# Patient Record
Sex: FEMALE | Race: WHITE | Employment: OTHER | URBAN - METROPOLITAN AREA
[De-identification: names, ages, dates, MRNs, and addresses within clinical notes are randomized per-mention and may not be internally consistent; named-entity substitution may affect disease eponyms.]

---

## 2019-02-07 ENCOUNTER — TELEPHONE (OUTPATIENT)
Dept: FAMILY MEDICINE CLINIC | Age: 64
End: 2019-02-07

## 2019-03-07 ENCOUNTER — OFFICE VISIT (OUTPATIENT)
Dept: FAMILY MEDICINE CLINIC | Age: 64
End: 2019-03-07
Payer: COMMERCIAL

## 2019-03-07 VITALS
DIASTOLIC BLOOD PRESSURE: 90 MMHG | OXYGEN SATURATION: 98 % | HEIGHT: 60 IN | SYSTOLIC BLOOD PRESSURE: 169 MMHG | HEART RATE: 107 BPM | BODY MASS INDEX: 43.07 KG/M2 | WEIGHT: 219.4 LBS

## 2019-03-07 DIAGNOSIS — I73.00 RAYNAUD'S DISEASE WITHOUT GANGRENE: ICD-10-CM

## 2019-03-07 DIAGNOSIS — K21.00 GASTROESOPHAGEAL REFLUX DISEASE WITH ESOPHAGITIS: ICD-10-CM

## 2019-03-07 DIAGNOSIS — Z00.00 WELL ADULT EXAM: ICD-10-CM

## 2019-03-07 DIAGNOSIS — R03.0 BORDERLINE HIGH BLOOD PRESSURE: ICD-10-CM

## 2019-03-07 PROCEDURE — 99203 OFFICE O/P NEW LOW 30 MIN: CPT | Performed by: FAMILY MEDICINE

## 2019-03-07 RX ORDER — PANTOPRAZOLE SODIUM 20 MG/1
20 TABLET, DELAYED RELEASE ORAL
Qty: 30 TABLET | Refills: 0 | Status: SHIPPED | OUTPATIENT
Start: 2019-03-07 | End: 2019-04-18 | Stop reason: SDUPTHER

## 2019-03-07 SDOH — SOCIAL STABILITY: SOCIAL INSECURITY: WITHIN THE LAST YEAR, HAVE YOU BEEN AFRAID OF YOUR PARTNER OR EX-PARTNER?: NO

## 2019-03-07 SDOH — SOCIAL STABILITY: SOCIAL NETWORK: ARE YOU MARRIED, WIDOWED, DIVORCED, SEPARATED, NEVER MARRIED, OR LIVING WITH A PARTNER?: MARRIED

## 2019-03-07 SDOH — SOCIAL STABILITY: SOCIAL INSECURITY
WITHIN THE LAST YEAR, HAVE YOU BEEN KICKED, HIT, SLAPPED, OR OTHERWISE PHYSICALLY HURT BY YOUR PARTNER OR EX-PARTNER?: NO

## 2019-03-07 SDOH — SOCIAL STABILITY: SOCIAL INSECURITY
WITHIN THE LAST YEAR, HAVE TO BEEN RAPED OR FORCED TO HAVE ANY KIND OF SEXUAL ACTIVITY BY YOUR PARTNER OR EX-PARTNER?: NO

## 2019-03-07 SDOH — HEALTH STABILITY: MENTAL HEALTH: HOW OFTEN DO YOU HAVE A DRINK CONTAINING ALCOHOL?: 4 OR MORE TIMES A WEEK

## 2019-03-07 SDOH — SOCIAL STABILITY: SOCIAL NETWORK: HOW OFTEN DO YOU ATTEND CHURCH OR RELIGIOUS SERVICES?: MORE THAN 4 TIMES PER YEAR

## 2019-03-07 SDOH — HEALTH STABILITY: MENTAL HEALTH: HOW MANY STANDARD DRINKS CONTAINING ALCOHOL DO YOU HAVE ON A TYPICAL DAY?: 1 OR 2

## 2019-03-07 SDOH — SOCIAL STABILITY: SOCIAL NETWORK
DO YOU BELONG TO ANY CLUBS OR ORGANIZATIONS SUCH AS CHURCH GROUPS UNIONS, FRATERNAL OR ATHLETIC GROUPS, OR SCHOOL GROUPS?: NO

## 2019-03-07 SDOH — SOCIAL STABILITY: SOCIAL NETWORK: IN A TYPICAL WEEK, HOW MANY TIMES DO YOU TALK ON THE PHONE WITH FAMILY, FRIENDS, OR NEIGHBORS?: THREE TIMES A WEEK

## 2019-03-07 SDOH — SOCIAL STABILITY: SOCIAL NETWORK: HOW OFTEN DO YOU ATTENT MEETINGS OF THE CLUB OR ORGANIZATION YOU BELONG TO?: NEVER

## 2019-03-07 SDOH — SOCIAL STABILITY: SOCIAL NETWORK: HOW OFTEN DO YOU GET TOGETHER WITH FRIENDS OR RELATIVES?: TWICE A WEEK

## 2019-03-07 SDOH — SOCIAL STABILITY: SOCIAL INSECURITY: WITHIN THE LAST YEAR, HAVE YOU BEEN HUMILIATED OR EMOTIONALLY ABUSED IN OTHER WAYS BY YOUR PARTNER OR EX-PARTNER?: NO

## 2019-03-07 ASSESSMENT — ENCOUNTER SYMPTOMS
BELCHING: 0
CHOKING: 0
STRIDOR: 0
WATER BRASH: 0
SORE THROAT: 0
HOARSE VOICE: 0
HEARTBURN: 1
COUGH: 1
ABDOMINAL PAIN: 0
GLOBUS SENSATION: 0
NAUSEA: 0
WHEEZING: 0

## 2019-03-07 ASSESSMENT — PATIENT HEALTH QUESTIONNAIRE - PHQ9
SUM OF ALL RESPONSES TO PHQ9 QUESTIONS 1 & 2: 0
2. FEELING DOWN, DEPRESSED OR HOPELESS: 0
SUM OF ALL RESPONSES TO PHQ QUESTIONS 1-9: 0
SUM OF ALL RESPONSES TO PHQ QUESTIONS 1-9: 0
1. LITTLE INTEREST OR PLEASURE IN DOING THINGS: 0

## 2019-04-06 PROBLEM — Z00.00 WELL ADULT EXAM: Status: RESOLVED | Noted: 2019-03-07 | Resolved: 2019-04-06

## 2019-04-18 ENCOUNTER — OFFICE VISIT (OUTPATIENT)
Dept: FAMILY MEDICINE CLINIC | Age: 64
End: 2019-04-18
Payer: COMMERCIAL

## 2019-04-18 VITALS
DIASTOLIC BLOOD PRESSURE: 106 MMHG | HEIGHT: 60 IN | OXYGEN SATURATION: 98 % | BODY MASS INDEX: 41.43 KG/M2 | WEIGHT: 211 LBS | HEART RATE: 95 BPM | SYSTOLIC BLOOD PRESSURE: 160 MMHG

## 2019-04-18 DIAGNOSIS — K21.00 GASTROESOPHAGEAL REFLUX DISEASE WITH ESOPHAGITIS: ICD-10-CM

## 2019-04-18 DIAGNOSIS — Z11.59 NEED FOR HEPATITIS C SCREENING TEST: ICD-10-CM

## 2019-04-18 DIAGNOSIS — R03.0 BORDERLINE HIGH BLOOD PRESSURE: ICD-10-CM

## 2019-04-18 DIAGNOSIS — E55.9 VITAMIN D DEFICIENCY: Primary | ICD-10-CM

## 2019-04-18 PROCEDURE — 99213 OFFICE O/P EST LOW 20 MIN: CPT | Performed by: FAMILY MEDICINE

## 2019-04-18 RX ORDER — PANTOPRAZOLE SODIUM 20 MG/1
20 TABLET, DELAYED RELEASE ORAL
Qty: 30 TABLET | Refills: 5 | Status: SHIPPED | OUTPATIENT
Start: 2019-04-18

## 2019-04-18 ASSESSMENT — ENCOUNTER SYMPTOMS
CHOKING: 0
ABDOMINAL PAIN: 0
HOARSE VOICE: 0
GLOBUS SENSATION: 0
WHEEZING: 0
HEARTBURN: 1
SORE THROAT: 0
WATER BRASH: 0
COUGH: 0
BELCHING: 0
STRIDOR: 0
NAUSEA: 0

## 2019-04-18 NOTE — PROGRESS NOTES
Subjective:     Patient Blayne Carranza is a 59 y.o. female. Gastroesophageal Reflux   She complains of heartburn. She reports no abdominal pain, no belching, no chest pain, no choking, no coughing, no dysphagia, no early satiety, no globus sensation, no hoarse voice, no nausea, no sore throat, no stridor, no tooth decay, no water brash or no wheezing. The current episode started 1 to 4 weeks ago. The problem occurs constantly. The heartburn is of moderate intensity. The heartburn does not wake her from sleep. The heartburn does not limit her activity. The heartburn doesn't change with position. The symptoms are aggravated by certain foods, ETOH and caffeine. Pertinent negatives include no anemia, fatigue, melena, muscle weakness, orthopnea or weight loss. She has tried a PPI for the symptoms. The treatment provided moderate relief. Past procedures do not include an abdominal ultrasound, an EGD, esophageal manometry, esophageal pH monitoring, H. pylori antibody titer or a UGI. Past invasive treatments do not include gastroplasty, gastroplication or reflux surgery. No Known Allergies    Current Outpatient Medications   Medication Sig Dispense Refill    pantoprazole (PROTONIX) 20 MG tablet Take 1 tablet by mouth every morning (before breakfast) 30 tablet 0     No current facility-administered medications for this visit. No past medical history on file.     Past Surgical History:   Procedure Laterality Date    BREAST BIOPSY  1990    benign       Social History     Socioeconomic History    Marital status:      Spouse name: Not on file    Number of children: 2    Years of education: Not on file    Highest education level: Not on file   Occupational History    Occupation: housewife   Social Needs    Financial resource strain: Not on file    Food insecurity:     Worry: Not on file     Inability: Not on file   [address] needs:     Medical: Not on file     Non-medical: Not on file Tobacco Use    Smoking status: Former Smoker     Types: Cigarettes     Last attempt to quit: 3/20/2005     Years since quittin.0    Smokeless tobacco: Never Used   Substance and Sexual Activity    Alcohol use: Yes     Alcohol/week: 0.6 - 1.2 oz     Types: 1 - 2 Cans of beer per week     Frequency: 4 or more times a week     Drinks per session: 1 or 2     Binge frequency: Never    Drug use: Never    Sexual activity: Yes     Partners: Male   Lifestyle    Physical activity:     Days per week: Not on file     Minutes per session: Not on file    Stress: Not on file   Relationships    Social connections:     Talks on phone: Three times a week     Gets together: Twice a week     Attends Anglican service: More than 4 times per year     Active member of club or organization: No     Attends meetings of clubs or organizations: Never     Relationship status:     Intimate partner violence:     Fear of current or ex partner: No     Emotionally abused: No     Physically abused: No     Forced sexual activity: No   Other Topics Concern    Not on file   Social History Narrative    Happily --has special needs kid    Hobbies--reads--puzzles       Family History   Problem Relation Age of Onset    Breast Cancer Mother 50       Immunization History   Administered Date(s) Administered    Tdap (Boostrix, Adacel) 2018       Review of Systems  Review of Systems   Constitutional: Negative for fatigue and weight loss. HENT: Negative for hoarse voice and sore throat. Respiratory: Negative for cough, choking and wheezing. Cardiovascular: Negative for chest pain. Gastrointestinal: Positive for heartburn. Negative for abdominal pain, dysphagia, melena and nausea. Musculoskeletal: Negative for muscle weakness. Objective:   Physical Exam  Physical Exam   Constitutional: She is oriented to person, place, and time. She appears well-developed and well-nourished. No distress.    HENT:

## 2019-04-29 DIAGNOSIS — K21.00 GASTROESOPHAGEAL REFLUX DISEASE WITH ESOPHAGITIS: ICD-10-CM

## 2019-04-29 DIAGNOSIS — R03.0 BORDERLINE HIGH BLOOD PRESSURE: ICD-10-CM

## 2019-04-29 DIAGNOSIS — E55.9 VITAMIN D DEFICIENCY: ICD-10-CM

## 2019-04-29 DIAGNOSIS — Z11.59 NEED FOR HEPATITIS C SCREENING TEST: ICD-10-CM

## 2019-04-29 DIAGNOSIS — R79.89 ABNORMAL TSH: Primary | ICD-10-CM

## 2019-04-29 LAB
A/G RATIO: 1.6 (ref 1.1–2.2)
ALBUMIN SERPL-MCNC: 4.2 G/DL (ref 3.4–5)
ALP BLD-CCNC: 73 U/L (ref 40–129)
ALT SERPL-CCNC: 26 U/L (ref 10–40)
ANION GAP SERPL CALCULATED.3IONS-SCNC: 14 MMOL/L (ref 3–16)
AST SERPL-CCNC: 48 U/L (ref 15–37)
BASOPHILS ABSOLUTE: 0.1 K/UL (ref 0–0.2)
BASOPHILS RELATIVE PERCENT: 0.8 %
BILIRUB SERPL-MCNC: 0.8 MG/DL (ref 0–1)
BUN BLDV-MCNC: 7 MG/DL (ref 7–20)
CALCIUM SERPL-MCNC: 9.4 MG/DL (ref 8.3–10.6)
CHLORIDE BLD-SCNC: 98 MMOL/L (ref 99–110)
CHOLESTEROL, TOTAL: 217 MG/DL (ref 0–199)
CO2: 27 MMOL/L (ref 21–32)
CREAT SERPL-MCNC: <0.5 MG/DL (ref 0.6–1.2)
EOSINOPHILS ABSOLUTE: 0.2 K/UL (ref 0–0.6)
EOSINOPHILS RELATIVE PERCENT: 3.6 %
GFR AFRICAN AMERICAN: >60
GFR NON-AFRICAN AMERICAN: >60
GLOBULIN: 2.6 G/DL
GLUCOSE BLD-MCNC: 90 MG/DL (ref 70–99)
HCT VFR BLD CALC: 41.9 % (ref 36–48)
HDLC SERPL-MCNC: 56 MG/DL (ref 40–60)
HEMOGLOBIN: 14.2 G/DL (ref 12–16)
HEPATITIS C ANTIBODY INTERPRETATION: NORMAL
LDL CHOLESTEROL CALCULATED: 136 MG/DL
LYMPHOCYTES ABSOLUTE: 2.1 K/UL (ref 1–5.1)
LYMPHOCYTES RELATIVE PERCENT: 31.6 %
MCH RBC QN AUTO: 30.8 PG (ref 26–34)
MCHC RBC AUTO-ENTMCNC: 33.8 G/DL (ref 31–36)
MCV RBC AUTO: 91.2 FL (ref 80–100)
MONOCYTES ABSOLUTE: 0.5 K/UL (ref 0–1.3)
MONOCYTES RELATIVE PERCENT: 7.7 %
NEUTROPHILS ABSOLUTE: 3.8 K/UL (ref 1.7–7.7)
NEUTROPHILS RELATIVE PERCENT: 56.3 %
PDW BLD-RTO: 13.5 % (ref 12.4–15.4)
PLATELET # BLD: 325 K/UL (ref 135–450)
PMV BLD AUTO: 9.2 FL (ref 5–10.5)
POTASSIUM SERPL-SCNC: 4.7 MMOL/L (ref 3.5–5.1)
RBC # BLD: 4.59 M/UL (ref 4–5.2)
SODIUM BLD-SCNC: 139 MMOL/L (ref 136–145)
TOTAL PROTEIN: 6.8 G/DL (ref 6.4–8.2)
TRIGL SERPL-MCNC: 127 MG/DL (ref 0–150)
TSH SERPL DL<=0.05 MIU/L-ACNC: 5.9 UIU/ML (ref 0.27–4.2)
VITAMIN D 25-HYDROXY: 24.6 NG/ML
VLDLC SERPL CALC-MCNC: 25 MG/DL
WBC # BLD: 6.7 K/UL (ref 4–11)

## 2019-05-20 ENCOUNTER — OFFICE VISIT (OUTPATIENT)
Dept: FAMILY MEDICINE CLINIC | Age: 64
End: 2019-05-20
Payer: COMMERCIAL

## 2019-05-20 VITALS
BODY MASS INDEX: 40.35 KG/M2 | SYSTOLIC BLOOD PRESSURE: 134 MMHG | HEART RATE: 103 BPM | OXYGEN SATURATION: 95 % | WEIGHT: 206.6 LBS | DIASTOLIC BLOOD PRESSURE: 82 MMHG

## 2019-05-20 DIAGNOSIS — R03.0 BORDERLINE HIGH BLOOD PRESSURE: ICD-10-CM

## 2019-05-20 DIAGNOSIS — J30.1 SEASONAL ALLERGIC RHINITIS DUE TO POLLEN: ICD-10-CM

## 2019-05-20 PROCEDURE — 99213 OFFICE O/P EST LOW 20 MIN: CPT | Performed by: FAMILY MEDICINE

## 2019-05-20 ASSESSMENT — ENCOUNTER SYMPTOMS
SHORTNESS OF BREATH: 0
HEMOPTYSIS: 0
HEARTBURN: 0
WHEEZING: 0
SORE THROAT: 0
COUGH: 1
RHINORRHEA: 0

## 2019-05-20 NOTE — PROGRESS NOTES
Subjective:     Patient Gabriella Warner is a 59 y.o. female. Cough   This is a new problem. The current episode started in the past 7 days. The problem has been waxing and waning. The problem occurs constantly. The cough is non-productive. Pertinent negatives include no chest pain, chills, ear congestion, ear pain, fever, headaches, heartburn, hemoptysis, myalgias, nasal congestion, postnasal drip, rash, rhinorrhea, sore throat, shortness of breath, sweats, weight loss or wheezing. Nothing aggravates the symptoms. She has tried nothing for the symptoms. The treatment provided no relief. Her past medical history is significant for environmental allergies. There is no history of asthma, bronchiectasis, bronchitis, COPD, emphysema or pneumonia. No Known Allergies    Current Outpatient Medications   Medication Sig Dispense Refill    pantoprazole (PROTONIX) 20 MG tablet Take 1 tablet by mouth every morning (before breakfast) 30 tablet 5     No current facility-administered medications for this visit. No past medical history on file. Past Surgical History:   Procedure Laterality Date    BREAST BIOPSY      benign       Social History     Socioeconomic History    Marital status:      Spouse name: Not on file    Number of children: 2    Years of education: Not on file    Highest education level: Not on file   Occupational History    Occupation: housewife   Social Needs    Financial resource strain: Not on file    Food insecurity:     Worry: Not on file     Inability: Not on file   [employer] needs:     Medical: Not on file     Non-medical: Not on file   Tobacco Use    Smoking status: Former Smoker     Types: Cigarettes     Last attempt to quit: 3/20/2005     Years since quittin.1    Smokeless tobacco: Never Used   Substance and Sexual Activity    Alcohol use:  Yes     Alcohol/week: 0.6 - 1.2 oz     Types: 1 - 2 Cans of beer per week     Frequency: 4 or more times a Cardiovascular: Normal rate, regular rhythm, normal heart sounds and intact distal pulses. Exam reveals no gallop. No murmur heard. Pulmonary/Chest: Effort normal and breath sounds normal. No stridor. No respiratory distress. She has no wheezes. She has no rales. She exhibits no tenderness. Abdominal: Soft. Bowel sounds are normal. She exhibits no distension and no mass. There is no tenderness. Musculoskeletal: She exhibits no edema or tenderness. Lymphadenopathy:     She has no cervical adenopathy. Neurological: She is alert and oriented to person, place, and time. She displays normal reflexes. No cranial nerve deficit. She exhibits normal muscle tone. Coordination normal.   Skin: Skin is warm and dry. No rash noted. No erythema. No pallor. Psychiatric: She has a normal mood and affect. Her behavior is normal. Judgment and thought content normal.   Vitals reviewed.       Assessment and Plan:       Borderline high blood pressure  Still high--will watch--her home numbers were high    Seasonal allergic rhinitis due to pollen  Allegra and nasacort

## 2019-07-15 ENCOUNTER — OFFICE VISIT (OUTPATIENT)
Dept: FAMILY MEDICINE CLINIC | Age: 64
End: 2019-07-15
Payer: COMMERCIAL

## 2019-07-15 VITALS
SYSTOLIC BLOOD PRESSURE: 180 MMHG | HEIGHT: 60 IN | OXYGEN SATURATION: 97 % | BODY MASS INDEX: 39.03 KG/M2 | WEIGHT: 198.8 LBS | DIASTOLIC BLOOD PRESSURE: 120 MMHG | HEART RATE: 76 BPM

## 2019-07-15 DIAGNOSIS — I10 BENIGN ESSENTIAL HTN: ICD-10-CM

## 2019-07-15 PROCEDURE — 99213 OFFICE O/P EST LOW 20 MIN: CPT | Performed by: FAMILY MEDICINE

## 2019-07-15 RX ORDER — LISINOPRIL 10 MG/1
10 TABLET ORAL DAILY
Qty: 30 TABLET | Refills: 1 | Status: SHIPPED | OUTPATIENT
Start: 2019-07-15 | End: 2019-08-30

## 2019-07-15 ASSESSMENT — ENCOUNTER SYMPTOMS
ORTHOPNEA: 0
SHORTNESS OF BREATH: 0
BLURRED VISION: 0

## 2019-07-15 NOTE — PROGRESS NOTES
Subjective:     Patient Mariela Reyes is a 59 y.o. female. Hypertension   This is a new problem. The current episode started 1 to 4 weeks ago. The problem is unchanged. The problem is uncontrolled. Pertinent negatives include no anxiety, blurred vision, chest pain, headaches, malaise/fatigue, neck pain, orthopnea, palpitations, peripheral edema, PND, shortness of breath or sweats. There are no associated agents to hypertension. There are no known risk factors for coronary artery disease. Past treatments include ACE inhibitors. The current treatment provides moderate improvement. No Known Allergies    Current Outpatient Medications   Medication Sig Dispense Refill    Multiple Vitamins-Minerals (MULTIVITAMIN ADULT PO) Take by mouth      Nutritional Supplements (VITAMIN D BOOSTER PO) Take by mouth      pantoprazole (PROTONIX) 20 MG tablet Take 1 tablet by mouth every morning (before breakfast) 30 tablet 5     No current facility-administered medications for this visit. No past medical history on file. Past Surgical History:   Procedure Laterality Date    BREAST BIOPSY      benign       Social History     Socioeconomic History    Marital status:      Spouse name: Not on file    Number of children: 2    Years of education: Not on file    Highest education level: Not on file   Occupational History    Occupation: housewife   Social Needs    Financial resource strain: Not on file    Food insecurity:     Worry: Not on file     Inability: Not on file   [place] needs:     Medical: Not on file     Non-medical: Not on file   Tobacco Use    Smoking status: Former Smoker     Types: Cigarettes     Last attempt to quit: 3/20/2005     Years since quittin.3    Smokeless tobacco: Never Used   Substance and Sexual Activity    Alcohol use:  Yes     Alcohol/week: 1.0 - 2.0 standard drinks     Types: 1 - 2 Cans of beer per week     Frequency: 4 or more times a week     Drinks per session: 1 or 2     Binge frequency: Never    Drug use: Never    Sexual activity: Yes     Partners: Male   Lifestyle    Physical activity:     Days per week: Not on file     Minutes per session: Not on file    Stress: Not on file   Relationships    Social connections:     Talks on phone: Three times a week     Gets together: Twice a week     Attends Latter-day service: More than 4 times per year     Active member of club or organization: No     Attends meetings of clubs or organizations: Never     Relationship status:     Intimate partner violence:     Fear of current or ex partner: No     Emotionally abused: No     Physically abused: No     Forced sexual activity: No   Other Topics Concern    Not on file   Social History Narrative    Happily --has special needs kid    Hobbies--reads--puzzles       Family History   Problem Relation Age of Onset    Breast Cancer Mother 50       Immunization History   Administered Date(s) Administered    Tdap (Boostrix, Adacel) 11/09/2018       Review of Systems  Review of Systems   Constitutional: Negative for malaise/fatigue. Eyes: Negative for blurred vision. Respiratory: Negative for shortness of breath. Cardiovascular: Negative for chest pain, palpitations, orthopnea and PND. Musculoskeletal: Negative for neck pain. Neurological: Negative for headaches. Objective:   Physical Exam  Physical Exam   Constitutional: She is oriented to person, place, and time. She appears well-developed and well-nourished. No distress. HENT:   Mouth/Throat: Oropharynx is clear and moist.   Eyes: Pupils are equal, round, and reactive to light. Conjunctivae are normal.   Neck: No JVD present. No tracheal deviation present. No thyromegaly present. Cardiovascular: Normal rate, regular rhythm, normal heart sounds and intact distal pulses. Exam reveals no gallop. No murmur heard. Pulmonary/Chest: Effort normal and breath sounds normal. No stridor.  No

## 2019-08-21 ENCOUNTER — OFFICE VISIT (OUTPATIENT)
Dept: FAMILY MEDICINE CLINIC | Age: 64
End: 2019-08-21
Payer: COMMERCIAL

## 2019-08-21 VITALS
SYSTOLIC BLOOD PRESSURE: 160 MMHG | WEIGHT: 197.6 LBS | DIASTOLIC BLOOD PRESSURE: 110 MMHG | HEIGHT: 60 IN | BODY MASS INDEX: 38.79 KG/M2 | HEART RATE: 96 BPM | OXYGEN SATURATION: 99 %

## 2019-08-21 DIAGNOSIS — M62.81 MUSCLE WEAKNESS (GENERALIZED): ICD-10-CM

## 2019-08-21 DIAGNOSIS — R19.7 DIARRHEA DUE TO MALABSORPTION: ICD-10-CM

## 2019-08-21 DIAGNOSIS — I10 BENIGN ESSENTIAL HTN: ICD-10-CM

## 2019-08-21 DIAGNOSIS — K90.9 DIARRHEA DUE TO MALABSORPTION: ICD-10-CM

## 2019-08-21 LAB
A/G RATIO: 1.8 (ref 1.1–2.2)
ALBUMIN SERPL-MCNC: 4.7 G/DL (ref 3.4–5)
ALP BLD-CCNC: 63 U/L (ref 40–129)
ALT SERPL-CCNC: 41 U/L (ref 10–40)
ANION GAP SERPL CALCULATED.3IONS-SCNC: 14 MMOL/L (ref 3–16)
AST SERPL-CCNC: 87 U/L (ref 15–37)
BASOPHILS ABSOLUTE: 0 K/UL (ref 0–0.2)
BASOPHILS RELATIVE PERCENT: 0.4 %
BILIRUB SERPL-MCNC: 0.7 MG/DL (ref 0–1)
BUN BLDV-MCNC: 12 MG/DL (ref 7–20)
CALCIUM SERPL-MCNC: 9.7 MG/DL (ref 8.3–10.6)
CHLORIDE BLD-SCNC: 100 MMOL/L (ref 99–110)
CO2: 25 MMOL/L (ref 21–32)
CREAT SERPL-MCNC: <0.5 MG/DL (ref 0.6–1.2)
EOSINOPHILS ABSOLUTE: 0.2 K/UL (ref 0–0.6)
EOSINOPHILS RELATIVE PERCENT: 2.4 %
GFR AFRICAN AMERICAN: >60
GFR NON-AFRICAN AMERICAN: >60
GLOBULIN: 2.6 G/DL
GLUCOSE BLD-MCNC: 88 MG/DL (ref 70–99)
HCT VFR BLD CALC: 42.6 % (ref 36–48)
HEMOGLOBIN: 14.3 G/DL (ref 12–16)
LYMPHOCYTES ABSOLUTE: 1.6 K/UL (ref 1–5.1)
LYMPHOCYTES RELATIVE PERCENT: 21.4 %
MAGNESIUM: 2.2 MG/DL (ref 1.8–2.4)
MCH RBC QN AUTO: 30.3 PG (ref 26–34)
MCHC RBC AUTO-ENTMCNC: 33.6 G/DL (ref 31–36)
MCV RBC AUTO: 90.4 FL (ref 80–100)
MONOCYTES ABSOLUTE: 0.4 K/UL (ref 0–1.3)
MONOCYTES RELATIVE PERCENT: 6.1 %
NEUTROPHILS ABSOLUTE: 5.1 K/UL (ref 1.7–7.7)
NEUTROPHILS RELATIVE PERCENT: 69.7 %
PDW BLD-RTO: 14 % (ref 12.4–15.4)
PLATELET # BLD: 284 K/UL (ref 135–450)
PMV BLD AUTO: 8.9 FL (ref 5–10.5)
POTASSIUM SERPL-SCNC: 4.6 MMOL/L (ref 3.5–5.1)
RBC # BLD: 4.71 M/UL (ref 4–5.2)
SEDIMENTATION RATE, ERYTHROCYTE: 13 MM/HR (ref 0–30)
SODIUM BLD-SCNC: 139 MMOL/L (ref 136–145)
TOTAL CK: 4229 U/L (ref 26–192)
TOTAL PROTEIN: 7.3 G/DL (ref 6.4–8.2)
TSH SERPL DL<=0.05 MIU/L-ACNC: 4.93 UIU/ML (ref 0.27–4.2)
WBC # BLD: 7.3 K/UL (ref 4–11)

## 2019-08-21 PROCEDURE — 99214 OFFICE O/P EST MOD 30 MIN: CPT | Performed by: FAMILY MEDICINE

## 2019-08-21 ASSESSMENT — ENCOUNTER SYMPTOMS
COUGH: 0
VISUAL CHANGE: 0
SWOLLEN GLANDS: 0
ORTHOPNEA: 0
CHANGE IN BOWEL HABIT: 0
SORE THROAT: 0
SHORTNESS OF BREATH: 0
BLURRED VISION: 0
NAUSEA: 0
ABDOMINAL PAIN: 0
VOMITING: 0

## 2019-08-21 NOTE — PROGRESS NOTES
MG tablet Take 1 tablet by mouth every morning (before breakfast) 30 tablet 5     No current facility-administered medications for this visit. Past Medical History:   Diagnosis Date    Benign essential HTN 3/7/2019       Past Surgical History:   Procedure Laterality Date    BREAST BIOPSY      benign       Social History     Socioeconomic History    Marital status:      Spouse name: Not on file    Number of children: 2    Years of education: Not on file    Highest education level: Not on file   Occupational History    Occupation: housewife   Social Needs    Financial resource strain: Not on file    Food insecurity:     Worry: Not on file     Inability: Not on file   [employer] needs:     Medical: Not on file     Non-medical: Not on file   Tobacco Use    Smoking status: Former Smoker     Types: Cigarettes     Last attempt to quit: 3/20/2005     Years since quittin.4    Smokeless tobacco: Never Used   Substance and Sexual Activity    Alcohol use: Yes     Alcohol/week: 1.0 - 2.0 standard drinks     Types: 1 - 2 Cans of beer per week     Frequency: 4 or more times a week     Drinks per session: 1 or 2     Binge frequency: Never    Drug use: Never    Sexual activity: Yes     Partners: Male   Lifestyle    Physical activity:     Days per week: Not on file     Minutes per session: Not on file    Stress: Not on file   Relationships    Social connections:     Talks on phone:  Three times a week     Gets together: Twice a week     Attends Zoroastrianism service: More than 4 times per year     Active member of club or organization: No     Attends meetings of clubs or organizations: Never     Relationship status:     Intimate partner violence:     Fear of current or ex partner: No     Emotionally abused: No     Physically abused: No     Forced sexual activity: No   Other Topics Concern    Not on file   Social History Narrative    Happily --has special needs kid Hobbies--reads--puzzles       Family History   Problem Relation Age of Onset    Breast Cancer Mother 50       Immunization History   Administered Date(s) Administered    Tdap (Boostrix, Adacel) 11/09/2018       Review of Systems  Review of Systems   Constitutional: Positive for fatigue. Negative for chills, fever and malaise/fatigue. HENT: Negative for congestion and sore throat. Eyes: Negative for blurred vision. Respiratory: Negative for cough and shortness of breath. Cardiovascular: Negative for chest pain, palpitations, orthopnea and PND. Gastrointestinal: Negative for abdominal pain, anorexia, change in bowel habit, nausea and vomiting. Musculoskeletal: Negative for arthralgias, joint swelling, myalgias and neck pain. Skin: Negative for rash. Neurological: Positive for weakness (LE>UE). Negative for vertigo, numbness and headaches. Objective:   Physical Exam  Physical Exam   Constitutional: She is oriented to person, place, and time. She appears well-developed and well-nourished. No distress. HENT:   Mouth/Throat: Oropharynx is clear and moist.   Eyes: Pupils are equal, round, and reactive to light. Conjunctivae are normal.   Neck: No JVD present. No tracheal deviation present. No thyromegaly present. Cardiovascular: Normal rate, regular rhythm, normal heart sounds and intact distal pulses. Exam reveals no gallop. No murmur heard. Pulmonary/Chest: Effort normal and breath sounds normal. No stridor. No respiratory distress. She has no wheezes. She has no rales. She exhibits no tenderness. Abdominal: Soft. Bowel sounds are normal. She exhibits no distension and no mass. There is no tenderness. Musculoskeletal: She exhibits no edema or tenderness. Lymphadenopathy:     She has no cervical adenopathy. Neurological: She is alert and oriented to person, place, and time. She displays normal reflexes. No cranial nerve deficit. She exhibits normal muscle tone.  Coordination normal.

## 2019-08-22 ENCOUNTER — TELEPHONE (OUTPATIENT)
Dept: FAMILY MEDICINE CLINIC | Age: 64
End: 2019-08-22

## 2019-08-22 DIAGNOSIS — M62.81 MUSCLE WEAKNESS (GENERALIZED): Primary | ICD-10-CM

## 2019-08-22 NOTE — TELEPHONE ENCOUNTER
Pt states she spoke with Dr. Saniya Faria and he wants her to be scheduled for next Thursday or Friday.  Pls call pt to schedule

## 2019-08-23 DIAGNOSIS — M62.81 MUSCLE WEAKNESS (GENERALIZED): ICD-10-CM

## 2019-08-23 LAB — IGA: 190 MG/DL (ref 68–408)

## 2019-08-24 LAB — TISSUE TRANSGLUTAMINASE IGA: 0 U/ML (ref 0–3)

## 2019-08-25 LAB — ALDOLASE: 37.5 U/L (ref 1.5–8.1)

## 2019-08-30 ENCOUNTER — TELEPHONE (OUTPATIENT)
Dept: FAMILY MEDICINE CLINIC | Age: 64
End: 2019-08-30

## 2019-08-30 ENCOUNTER — OFFICE VISIT (OUTPATIENT)
Dept: FAMILY MEDICINE CLINIC | Age: 64
End: 2019-08-30
Payer: COMMERCIAL

## 2019-08-30 VITALS
DIASTOLIC BLOOD PRESSURE: 110 MMHG | OXYGEN SATURATION: 98 % | SYSTOLIC BLOOD PRESSURE: 180 MMHG | BODY MASS INDEX: 38.52 KG/M2 | HEIGHT: 60 IN | WEIGHT: 196.2 LBS | HEART RATE: 104 BPM

## 2019-08-30 DIAGNOSIS — M62.81 MUSCLE WEAKNESS (GENERALIZED): Primary | ICD-10-CM

## 2019-08-30 DIAGNOSIS — M62.81 MUSCLE WEAKNESS (GENERALIZED): ICD-10-CM

## 2019-08-30 DIAGNOSIS — I10 BENIGN ESSENTIAL HTN: ICD-10-CM

## 2019-08-30 LAB
ANION GAP SERPL CALCULATED.3IONS-SCNC: 15 MMOL/L (ref 3–16)
BUN BLDV-MCNC: 14 MG/DL (ref 7–20)
CALCIUM SERPL-MCNC: 9.7 MG/DL (ref 8.3–10.6)
CHLORIDE BLD-SCNC: 98 MMOL/L (ref 99–110)
CO2: 25 MMOL/L (ref 21–32)
CREAT SERPL-MCNC: <0.5 MG/DL (ref 0.6–1.2)
GFR AFRICAN AMERICAN: >60
GFR NON-AFRICAN AMERICAN: >60
GLUCOSE BLD-MCNC: 97 MG/DL (ref 70–99)
POTASSIUM SERPL-SCNC: 4.7 MMOL/L (ref 3.5–5.1)
SODIUM BLD-SCNC: 138 MMOL/L (ref 136–145)
TOTAL CK: 5138 U/L (ref 26–192)

## 2019-08-30 PROCEDURE — 99213 OFFICE O/P EST LOW 20 MIN: CPT | Performed by: FAMILY MEDICINE

## 2019-08-30 RX ORDER — LISINOPRIL 20 MG/1
20 TABLET ORAL DAILY
Qty: 30 TABLET | Refills: 2 | Status: SHIPPED | OUTPATIENT
Start: 2019-08-30 | End: 2019-11-24 | Stop reason: SDUPTHER

## 2019-08-30 ASSESSMENT — ENCOUNTER SYMPTOMS
NAUSEA: 0
ABDOMINAL PAIN: 0
VOMITING: 0
VISUAL CHANGE: 0
SORE THROAT: 0
CHANGE IN BOWEL HABIT: 0
COUGH: 0
SWOLLEN GLANDS: 0

## 2019-08-30 NOTE — PROGRESS NOTES
Smokeless tobacco: Never Used   Substance and Sexual Activity    Alcohol use: Yes     Alcohol/week: 1.0 - 2.0 standard drinks     Types: 1 - 2 Cans of beer per week     Frequency: 4 or more times a week     Drinks per session: 1 or 2     Binge frequency: Never    Drug use: Never    Sexual activity: Yes     Partners: Male   Lifestyle    Physical activity:     Days per week: Not on file     Minutes per session: Not on file    Stress: Not on file   Relationships    Social connections:     Talks on phone: Three times a week     Gets together: Twice a week     Attends Jew service: More than 4 times per year     Active member of club or organization: No     Attends meetings of clubs or organizations: Never     Relationship status:     Intimate partner violence:     Fear of current or ex partner: No     Emotionally abused: No     Physically abused: No     Forced sexual activity: No   Other Topics Concern    Not on file   Social History Narrative    Happily --has special needs kid    Hobbies--reads--puzzles       Family History   Problem Relation Age of Onset    Breast Cancer Mother 50       Immunization History   Administered Date(s) Administered    Tdap (Boostrix, Adacel) 11/09/2018       Review of Systems  Review of Systems   Constitutional: Negative for chills, diaphoresis, fatigue and fever. HENT: Negative for congestion and sore throat. Respiratory: Negative for cough. Cardiovascular: Negative for chest pain. Gastrointestinal: Negative for abdominal pain, anorexia, change in bowel habit, nausea and vomiting. Musculoskeletal: Negative for arthralgias, joint swelling, myalgias and neck pain. Skin: Negative for rash. Neurological: Negative for vertigo, weakness, numbness and headaches. Objective:   Physical Exam  Physical Exam   Constitutional: She is oriented to person, place, and time. She appears well-developed and well-nourished. No distress.    HENT:   Mouth/Throat:

## 2019-09-11 ENCOUNTER — OFFICE VISIT (OUTPATIENT)
Dept: RHEUMATOLOGY | Age: 64
End: 2019-09-11
Payer: COMMERCIAL

## 2019-09-11 VITALS
SYSTOLIC BLOOD PRESSURE: 160 MMHG | DIASTOLIC BLOOD PRESSURE: 100 MMHG | BODY MASS INDEX: 38.68 KG/M2 | WEIGHT: 197 LBS | HEIGHT: 60 IN

## 2019-09-11 DIAGNOSIS — R06.02 SHORTNESS OF BREATH: ICD-10-CM

## 2019-09-11 DIAGNOSIS — R74.8 ELEVATED CPK: ICD-10-CM

## 2019-09-11 DIAGNOSIS — I73.00 RAYNAUD'S DISEASE WITHOUT GANGRENE: ICD-10-CM

## 2019-09-11 DIAGNOSIS — R74.8 ELEVATED CPK: Primary | ICD-10-CM

## 2019-09-11 LAB
BILIRUBIN URINE: NEGATIVE
BLOOD, URINE: NEGATIVE
CLARITY: CLEAR
COLOR: YELLOW
EPITHELIAL CELLS, UA: 1 /HPF (ref 0–5)
GLUCOSE URINE: NEGATIVE MG/DL
HBV SURFACE AB TITR SER: <3.5 MIU/ML
HEPATITIS B CORE IGM ANTIBODY: NORMAL
HEPATITIS B SURFACE ANTIGEN INTERPRETATION: NORMAL
HYALINE CASTS: 0 /LPF (ref 0–8)
KETONES, URINE: NEGATIVE MG/DL
LEUKOCYTE ESTERASE, URINE: ABNORMAL
MICROSCOPIC EXAMINATION: YES
NITRITE, URINE: NEGATIVE
PH UA: 7 (ref 5–8)
PROTEIN UA: NEGATIVE MG/DL
RBC UA: 0 /HPF (ref 0–4)
RHEUMATOID FACTOR: <10 IU/ML
SPECIFIC GRAVITY UA: 1.01 (ref 1–1.03)
URINE TYPE: ABNORMAL
UROBILINOGEN, URINE: 0.2 E.U./DL
WBC UA: 0 /HPF (ref 0–5)

## 2019-09-11 PROCEDURE — 99205 OFFICE O/P NEW HI 60 MIN: CPT | Performed by: INTERNAL MEDICINE

## 2019-09-11 RX ORDER — CHLORAL HYDRATE 500 MG
3000 CAPSULE ORAL 3 TIMES DAILY
COMMUNITY

## 2019-09-11 NOTE — PROGRESS NOTES
does not have rash or photosensitivity. She is not up-to-date with age-specific cancer screening. Labs show elevated CPK  Pertinent ROS: Intentional weight loss 100 pound. Denies objective fever, skin rashes or skin thickening, photosensitivity  focal alopecia, recurrent ocular congestion, dry eyes or mouth, or mucosal ulcers, tinnitus or recent hearing loss. Denies chest pain, palpitations, pleurisy, dysphagia, or features of inflammatory bowel diseases. No h/o blood clots or bleeding disorders. No renal or genitourinary problems. No focal weakness or persistent paresthesia. All other ROS are negative. Past Medical History:   Diagnosis Date    Benign essential HTN 3/7/2019     Past Surgical History:   Procedure Laterality Date    BREAST BIOPSY      benign     Social History     Socioeconomic History    Marital status:      Spouse name: Not on file    Number of children: 2    Years of education: Not on file    Highest education level: Not on file   Occupational History    Occupation: housewife   Social Needs    Financial resource strain: Not on file    Food insecurity:     Worry: Not on file     Inability: Not on file   [employer] needs:     Medical: Not on file     Non-medical: Not on file   Tobacco Use    Smoking status: Former Smoker     Types: Cigarettes     Last attempt to quit: 3/20/2005     Years since quittin.4    Smokeless tobacco: Never Used   Substance and Sexual Activity    Alcohol use: Yes     Alcohol/week: 1.0 - 2.0 standard drinks     Types: 1 - 2 Cans of beer per week     Frequency: 4 or more times a week     Drinks per session: 1 or 2     Binge frequency: Never    Drug use: Never    Sexual activity: Yes     Partners: Male   Lifestyle    Physical activity:     Days per week: Not on file     Minutes per session: Not on file    Stress: Not on file   Relationships    Social connections:     Talks on phone:  Three times a week     Gets together: Twice a week Attends Islam service: More than 4 times per year     Active member of club or organization: No     Attends meetings of clubs or organizations: Never     Relationship status:     Intimate partner violence:     Fear of current or ex partner: No     Emotionally abused: No     Physically abused: No     Forced sexual activity: No   Other Topics Concern    Not on file   Social History Narrative    Happily --has special needs kid    Hobbies--reads--puzzles       No family history of autoimmune diseases    Current Outpatient Medications   Medication Sig Dispense Refill    Omega-3 Fatty Acids (FISH OIL) 1000 MG CAPS Take 3,000 mg by mouth 3 times daily      B COMPLEX-C PO Take by mouth      Multiple Minerals-Vitamins (CALCIUM-MAGNESIUM-ZINC-D3 PO) Take by mouth      lisinopril (PRINIVIL;ZESTRIL) 20 MG tablet Take 1 tablet by mouth daily 30 tablet 2    Nutritional Supplements (VITAMIN D BOOSTER PO) Take by mouth      pantoprazole (PROTONIX) 20 MG tablet Take 1 tablet by mouth every morning (before breakfast) (Patient not taking: Reported on 9/11/2019) 30 tablet 5     No current facility-administered medications for this visit. No Known Allergies    PHYSICAL EXAM:    Vitals:    BP (!) 160/100   Ht 5' (1.524 m)   Wt 197 lb (89.4 kg)   BMI 38.47 kg/m²   General appearance/ Psychiatric: well nourished, and well groomed, normal judgement, alert, appears stated age and cooperative. MKS: Sclerodactyly in all her fingers distal to PIP joints bilaterally. Mild periungual changes present in almost all fingernails. I do not see any dilated nailfold capillaries. She does not have any tender, swollen or inflamed joints in upper or lower extremities. Muscle strength -upper extremities proximal biceps triceps-4/5-distal normal.  Normal finger flexors. Thigh muscles-quadriceps, hamstring-abductors abductors-4-/5.   Calf muscles and leg muscles normal.  She is not able to get up from chair without

## 2019-09-12 LAB
ANTI-NUCLEAR ANTIBODY (ANA): NEGATIVE
CYCLIC CITRULLINATED PEPTIDE ANTIBODY IGG: <0.5 U/ML (ref 0–2.9)

## 2019-09-25 LAB — MISCELLANEOUS LAB TEST ORDER: NORMAL

## 2019-09-27 ENCOUNTER — HOSPITAL ENCOUNTER (OUTPATIENT)
Dept: PULMONOLOGY | Age: 64
Discharge: HOME | End: 2019-09-27
Payer: COMMERCIAL

## 2019-09-27 VITALS — OXYGEN SATURATION: 99 %

## 2019-09-27 DIAGNOSIS — R06.02 SHORTNESS OF BREATH: ICD-10-CM

## 2019-09-27 PROCEDURE — 6360000002 HC RX W HCPCS: Performed by: INTERNAL MEDICINE

## 2019-09-27 PROCEDURE — 94640 AIRWAY INHALATION TREATMENT: CPT

## 2019-09-27 PROCEDURE — 94760 N-INVAS EAR/PLS OXIMETRY 1: CPT

## 2019-09-27 PROCEDURE — 94729 DIFFUSING CAPACITY: CPT

## 2019-09-27 PROCEDURE — 94726 PLETHYSMOGRAPHY LUNG VOLUMES: CPT

## 2019-09-27 PROCEDURE — 94664 DEMO&/EVAL PT USE INHALER: CPT

## 2019-09-27 PROCEDURE — 94060 EVALUATION OF WHEEZING: CPT

## 2019-09-27 RX ORDER — ALBUTEROL SULFATE 2.5 MG/3ML
2.5 SOLUTION RESPIRATORY (INHALATION) ONCE
Status: COMPLETED | OUTPATIENT
Start: 2019-09-27 | End: 2019-09-27

## 2019-09-27 RX ADMIN — ALBUTEROL SULFATE 2.5 MG: 2.5 SOLUTION RESPIRATORY (INHALATION) at 10:07

## 2019-10-03 ENCOUNTER — HOSPITAL ENCOUNTER (OUTPATIENT)
Dept: NEUROLOGY | Age: 64
Discharge: HOME | End: 2019-10-03
Payer: COMMERCIAL

## 2019-10-03 DIAGNOSIS — R74.8 ELEVATED CPK: ICD-10-CM

## 2019-10-03 PROCEDURE — 95861 NEEDLE EMG 2 EXTREMITIES: CPT

## 2019-10-03 PROCEDURE — 95909 NRV CNDJ TST 5-6 STUDIES: CPT

## 2019-10-07 ENCOUNTER — TELEPHONE (OUTPATIENT)
Dept: BARIATRICS/WEIGHT MGMT | Age: 64
End: 2019-10-07

## 2019-10-07 ENCOUNTER — ANESTHESIA EVENT (OUTPATIENT)
Dept: OPERATING ROOM | Age: 64
End: 2019-10-07
Payer: COMMERCIAL

## 2019-10-07 ENCOUNTER — INITIAL CONSULT (OUTPATIENT)
Dept: BARIATRICS/WEIGHT MGMT | Age: 64
End: 2019-10-07
Payer: COMMERCIAL

## 2019-10-07 VITALS
WEIGHT: 196 LBS | SYSTOLIC BLOOD PRESSURE: 168 MMHG | HEART RATE: 96 BPM | DIASTOLIC BLOOD PRESSURE: 100 MMHG | BODY MASS INDEX: 38.48 KG/M2 | HEIGHT: 60 IN

## 2019-10-07 DIAGNOSIS — M62.81 MUSCLE WEAKNESS (GENERALIZED): Primary | ICD-10-CM

## 2019-10-07 PROCEDURE — 99203 OFFICE O/P NEW LOW 30 MIN: CPT | Performed by: SURGERY

## 2019-10-08 ENCOUNTER — ANESTHESIA (OUTPATIENT)
Dept: OPERATING ROOM | Age: 64
End: 2019-10-08
Payer: COMMERCIAL

## 2019-10-08 ENCOUNTER — HOSPITAL ENCOUNTER (OUTPATIENT)
Age: 64
Setting detail: OUTPATIENT SURGERY
Discharge: HOME | End: 2019-10-08
Attending: SURGERY | Admitting: SURGERY
Payer: COMMERCIAL

## 2019-10-08 VITALS
HEART RATE: 69 BPM | RESPIRATION RATE: 16 BRPM | SYSTOLIC BLOOD PRESSURE: 180 MMHG | TEMPERATURE: 97.9 F | OXYGEN SATURATION: 99 % | DIASTOLIC BLOOD PRESSURE: 80 MMHG | WEIGHT: 196 LBS | HEIGHT: 61 IN | BODY MASS INDEX: 37 KG/M2

## 2019-10-08 VITALS — DIASTOLIC BLOOD PRESSURE: 59 MMHG | OXYGEN SATURATION: 100 % | SYSTOLIC BLOOD PRESSURE: 118 MMHG

## 2019-10-08 DIAGNOSIS — G89.18 POST-OP PAIN: Primary | ICD-10-CM

## 2019-10-08 PROCEDURE — 3600000004 HC SURGERY LEVEL 4 BASE: Performed by: SURGERY

## 2019-10-08 PROCEDURE — 6360000002 HC RX W HCPCS: Performed by: NURSE ANESTHETIST, CERTIFIED REGISTERED

## 2019-10-08 PROCEDURE — 3600000014 HC SURGERY LEVEL 4 ADDTL 15MIN: Performed by: SURGERY

## 2019-10-08 PROCEDURE — 7100000010 HC PHASE II RECOVERY - FIRST 15 MIN: Performed by: SURGERY

## 2019-10-08 PROCEDURE — 7100000011 HC PHASE II RECOVERY - ADDTL 15 MIN: Performed by: SURGERY

## 2019-10-08 PROCEDURE — 3700000000 HC ANESTHESIA ATTENDED CARE: Performed by: SURGERY

## 2019-10-08 PROCEDURE — 3700000001 HC ADD 15 MINUTES (ANESTHESIA): Performed by: SURGERY

## 2019-10-08 PROCEDURE — 20205 DEEP MUSCLE BIOPSY: CPT | Performed by: SURGERY

## 2019-10-08 PROCEDURE — 2580000003 HC RX 258: Performed by: ANESTHESIOLOGY

## 2019-10-08 PROCEDURE — 2500000003 HC RX 250 WO HCPCS: Performed by: NURSE ANESTHETIST, CERTIFIED REGISTERED

## 2019-10-08 PROCEDURE — 88300 SURGICAL PATH GROSS: CPT

## 2019-10-08 PROCEDURE — 2709999900 HC NON-CHARGEABLE SUPPLY: Performed by: SURGERY

## 2019-10-08 PROCEDURE — 7100000000 HC PACU RECOVERY - FIRST 15 MIN: Performed by: SURGERY

## 2019-10-08 PROCEDURE — 6360000002 HC RX W HCPCS: Performed by: SURGERY

## 2019-10-08 PROCEDURE — 7100000001 HC PACU RECOVERY - ADDTL 15 MIN: Performed by: SURGERY

## 2019-10-08 RX ORDER — SODIUM CHLORIDE 0.9 % (FLUSH) 0.9 %
10 SYRINGE (ML) INJECTION PRN
Status: DISCONTINUED | OUTPATIENT
Start: 2019-10-08 | End: 2019-10-08 | Stop reason: HOSPADM

## 2019-10-08 RX ORDER — MIDAZOLAM HYDROCHLORIDE 1 MG/ML
INJECTION INTRAMUSCULAR; INTRAVENOUS PRN
Status: DISCONTINUED | OUTPATIENT
Start: 2019-10-08 | End: 2019-10-08 | Stop reason: SDUPTHER

## 2019-10-08 RX ORDER — HYDRALAZINE HYDROCHLORIDE 20 MG/ML
10 INJECTION INTRAMUSCULAR; INTRAVENOUS EVERY 10 MIN PRN
Status: DISCONTINUED | OUTPATIENT
Start: 2019-10-08 | End: 2019-10-08 | Stop reason: HOSPADM

## 2019-10-08 RX ORDER — MORPHINE SULFATE 4 MG/ML
3 INJECTION, SOLUTION INTRAMUSCULAR; INTRAVENOUS
Status: ACTIVE | OUTPATIENT
Start: 2019-10-08 | End: 2019-10-08

## 2019-10-08 RX ORDER — FENTANYL CITRATE 50 UG/ML
INJECTION, SOLUTION INTRAMUSCULAR; INTRAVENOUS PRN
Status: DISCONTINUED | OUTPATIENT
Start: 2019-10-08 | End: 2019-10-08 | Stop reason: SDUPTHER

## 2019-10-08 RX ORDER — PROPOFOL 10 MG/ML
INJECTION, EMULSION INTRAVENOUS PRN
Status: DISCONTINUED | OUTPATIENT
Start: 2019-10-08 | End: 2019-10-08 | Stop reason: SDUPTHER

## 2019-10-08 RX ORDER — PROCHLORPERAZINE EDISYLATE 5 MG/ML
5 INJECTION INTRAMUSCULAR; INTRAVENOUS
Status: DISCONTINUED | OUTPATIENT
Start: 2019-10-08 | End: 2019-10-08 | Stop reason: HOSPADM

## 2019-10-08 RX ORDER — SODIUM CHLORIDE 0.9 % (FLUSH) 0.9 %
10 SYRINGE (ML) INJECTION EVERY 12 HOURS SCHEDULED
Status: DISCONTINUED | OUTPATIENT
Start: 2019-10-08 | End: 2019-10-08 | Stop reason: HOSPADM

## 2019-10-08 RX ORDER — SODIUM CHLORIDE, SODIUM LACTATE, POTASSIUM CHLORIDE, CALCIUM CHLORIDE 600; 310; 30; 20 MG/100ML; MG/100ML; MG/100ML; MG/100ML
INJECTION, SOLUTION INTRAVENOUS CONTINUOUS
Status: DISCONTINUED | OUTPATIENT
Start: 2019-10-08 | End: 2019-10-08 | Stop reason: HOSPADM

## 2019-10-08 RX ORDER — METOPROLOL TARTRATE 5 MG/5ML
INJECTION INTRAVENOUS PRN
Status: DISCONTINUED | OUTPATIENT
Start: 2019-10-08 | End: 2019-10-08 | Stop reason: SDUPTHER

## 2019-10-08 RX ORDER — LIDOCAINE HYDROCHLORIDE 20 MG/ML
INJECTION, SOLUTION INTRAVENOUS PRN
Status: DISCONTINUED | OUTPATIENT
Start: 2019-10-08 | End: 2019-10-08 | Stop reason: SDUPTHER

## 2019-10-08 RX ORDER — ONDANSETRON 2 MG/ML
4 INJECTION INTRAMUSCULAR; INTRAVENOUS
Status: DISCONTINUED | OUTPATIENT
Start: 2019-10-08 | End: 2019-10-08 | Stop reason: HOSPADM

## 2019-10-08 RX ORDER — MEPERIDINE HYDROCHLORIDE 25 MG/ML
12.5 INJECTION INTRAMUSCULAR; INTRAVENOUS; SUBCUTANEOUS EVERY 5 MIN PRN
Status: DISCONTINUED | OUTPATIENT
Start: 2019-10-08 | End: 2019-10-08 | Stop reason: HOSPADM

## 2019-10-08 RX ORDER — OXYCODONE HYDROCHLORIDE 5 MG/1
5 TABLET ORAL
Status: DISCONTINUED | OUTPATIENT
Start: 2019-10-08 | End: 2019-10-08 | Stop reason: HOSPADM

## 2019-10-08 RX ORDER — OXYCODONE HYDROCHLORIDE AND ACETAMINOPHEN 5; 325 MG/1; MG/1
1 TABLET ORAL EVERY 4 HOURS PRN
Qty: 6 TABLET | Refills: 0 | Status: SHIPPED | OUTPATIENT
Start: 2019-10-08 | End: 2019-10-11

## 2019-10-08 RX ADMIN — PROPOFOL 40 MG: 10 INJECTION, EMULSION INTRAVENOUS at 13:40

## 2019-10-08 RX ADMIN — PROPOFOL 40 MG: 10 INJECTION, EMULSION INTRAVENOUS at 14:19

## 2019-10-08 RX ADMIN — SODIUM CHLORIDE, SODIUM LACTATE, POTASSIUM CHLORIDE, AND CALCIUM CHLORIDE: 600; 310; 30; 20 INJECTION, SOLUTION INTRAVENOUS at 13:12

## 2019-10-08 RX ADMIN — PROPOFOL 40 MG: 10 INJECTION, EMULSION INTRAVENOUS at 14:35

## 2019-10-08 RX ADMIN — PROPOFOL 40 MG: 10 INJECTION, EMULSION INTRAVENOUS at 13:44

## 2019-10-08 RX ADMIN — PROPOFOL 40 MG: 10 INJECTION, EMULSION INTRAVENOUS at 14:14

## 2019-10-08 RX ADMIN — PROPOFOL 40 MG: 10 INJECTION, EMULSION INTRAVENOUS at 14:45

## 2019-10-08 RX ADMIN — SODIUM CHLORIDE, SODIUM LACTATE, POTASSIUM CHLORIDE, AND CALCIUM CHLORIDE: 600; 310; 30; 20 INJECTION, SOLUTION INTRAVENOUS at 12:38

## 2019-10-08 RX ADMIN — FENTANYL CITRATE 50 MCG: 50 INJECTION INTRAMUSCULAR; INTRAVENOUS at 13:32

## 2019-10-08 RX ADMIN — PROPOFOL 40 MG: 10 INJECTION, EMULSION INTRAVENOUS at 13:56

## 2019-10-08 RX ADMIN — PROPOFOL 40 MG: 10 INJECTION, EMULSION INTRAVENOUS at 13:48

## 2019-10-08 RX ADMIN — PROPOFOL 40 MG: 10 INJECTION, EMULSION INTRAVENOUS at 14:40

## 2019-10-08 RX ADMIN — PROPOFOL 40 MG: 10 INJECTION, EMULSION INTRAVENOUS at 14:25

## 2019-10-08 RX ADMIN — PROPOFOL 40 MG: 10 INJECTION, EMULSION INTRAVENOUS at 13:36

## 2019-10-08 RX ADMIN — LIDOCAINE HYDROCHLORIDE 100 MG: 20 INJECTION, SOLUTION INTRAVENOUS at 13:32

## 2019-10-08 RX ADMIN — MIDAZOLAM HYDROCHLORIDE 2 MG: 2 INJECTION, SOLUTION INTRAMUSCULAR; INTRAVENOUS at 13:14

## 2019-10-08 RX ADMIN — PROPOFOL 40 MG: 10 INJECTION, EMULSION INTRAVENOUS at 14:00

## 2019-10-08 RX ADMIN — PROPOFOL 40 MG: 10 INJECTION, EMULSION INTRAVENOUS at 13:32

## 2019-10-08 RX ADMIN — METOPROLOL TARTRATE 2 MG: 5 INJECTION, SOLUTION INTRAVENOUS at 13:25

## 2019-10-08 RX ADMIN — PROPOFOL 40 MG: 10 INJECTION, EMULSION INTRAVENOUS at 14:05

## 2019-10-08 RX ADMIN — FENTANYL CITRATE 50 MCG: 50 INJECTION INTRAMUSCULAR; INTRAVENOUS at 14:29

## 2019-10-08 RX ADMIN — PROPOFOL 40 MG: 10 INJECTION, EMULSION INTRAVENOUS at 14:30

## 2019-10-08 RX ADMIN — Medication 2 G: at 13:25

## 2019-10-08 RX ADMIN — PROPOFOL 40 MG: 10 INJECTION, EMULSION INTRAVENOUS at 14:10

## 2019-10-08 RX ADMIN — PROPOFOL 40 MG: 10 INJECTION, EMULSION INTRAVENOUS at 13:52

## 2019-10-08 ASSESSMENT — PULMONARY FUNCTION TESTS
PIF_VALUE: 1
PIF_VALUE: 1
PIF_VALUE: 0
PIF_VALUE: 1
PIF_VALUE: 0
PIF_VALUE: 1
PIF_VALUE: 0
PIF_VALUE: 1
PIF_VALUE: 0
PIF_VALUE: 1
PIF_VALUE: 0
PIF_VALUE: 1
PIF_VALUE: 0
PIF_VALUE: 1
PIF_VALUE: 1
PIF_VALUE: 0
PIF_VALUE: 0
PIF_VALUE: 1
PIF_VALUE: 0
PIF_VALUE: 1
PIF_VALUE: 1
PIF_VALUE: 0
PIF_VALUE: 1
PIF_VALUE: 0
PIF_VALUE: 1
PIF_VALUE: 1
PIF_VALUE: 0
PIF_VALUE: 1
PIF_VALUE: 0
PIF_VALUE: 1

## 2019-10-08 ASSESSMENT — PAIN SCALES - GENERAL
PAINLEVEL_OUTOF10: 0
PAINLEVEL_OUTOF10: 4
PAINLEVEL_OUTOF10: 0

## 2019-10-08 ASSESSMENT — LIFESTYLE VARIABLES: SMOKING_STATUS: 0

## 2019-10-08 ASSESSMENT — PAIN - FUNCTIONAL ASSESSMENT: PAIN_FUNCTIONAL_ASSESSMENT: 0-10

## 2019-10-15 ENCOUNTER — OFFICE VISIT (OUTPATIENT)
Dept: RHEUMATOLOGY | Age: 64
End: 2019-10-15
Payer: COMMERCIAL

## 2019-10-15 VITALS
WEIGHT: 195 LBS | DIASTOLIC BLOOD PRESSURE: 88 MMHG | BODY MASS INDEX: 38.28 KG/M2 | HEIGHT: 60 IN | SYSTOLIC BLOOD PRESSURE: 138 MMHG

## 2019-10-15 DIAGNOSIS — R74.8 ELEVATED CPK: Primary | ICD-10-CM

## 2019-10-15 DIAGNOSIS — M62.81 MUSCLE WEAKNESS (GENERALIZED): ICD-10-CM

## 2019-10-15 DIAGNOSIS — Z79.52 ON PREDNISONE THERAPY: ICD-10-CM

## 2019-10-15 PROCEDURE — 1036F TOBACCO NON-USER: CPT | Performed by: INTERNAL MEDICINE

## 2019-10-15 PROCEDURE — G8484 FLU IMMUNIZE NO ADMIN: HCPCS | Performed by: INTERNAL MEDICINE

## 2019-10-15 PROCEDURE — 3017F COLORECTAL CA SCREEN DOC REV: CPT | Performed by: INTERNAL MEDICINE

## 2019-10-15 PROCEDURE — G8427 DOCREV CUR MEDS BY ELIG CLIN: HCPCS | Performed by: INTERNAL MEDICINE

## 2019-10-15 PROCEDURE — 99214 OFFICE O/P EST MOD 30 MIN: CPT | Performed by: INTERNAL MEDICINE

## 2019-10-15 PROCEDURE — G8417 CALC BMI ABV UP PARAM F/U: HCPCS | Performed by: INTERNAL MEDICINE

## 2019-10-15 RX ORDER — PREDNISONE 20 MG/1
TABLET ORAL
Qty: 60 TABLET | Refills: 0 | Status: SHIPPED | OUTPATIENT
Start: 2019-10-15 | End: 2019-11-07 | Stop reason: SDUPTHER

## 2019-10-16 ENCOUNTER — OFFICE VISIT (OUTPATIENT)
Dept: BARIATRICS/WEIGHT MGMT | Age: 64
End: 2019-10-16

## 2019-10-16 VITALS
SYSTOLIC BLOOD PRESSURE: 136 MMHG | WEIGHT: 193.6 LBS | HEART RATE: 80 BPM | BODY MASS INDEX: 37.81 KG/M2 | DIASTOLIC BLOOD PRESSURE: 88 MMHG

## 2019-10-16 DIAGNOSIS — M62.81 MUSCLE WEAKNESS (GENERALIZED): Primary | ICD-10-CM

## 2019-10-16 PROCEDURE — 99024 POSTOP FOLLOW-UP VISIT: CPT | Performed by: SURGERY

## 2019-10-21 ENCOUNTER — TELEPHONE (OUTPATIENT)
Dept: RHEUMATOLOGY | Age: 64
End: 2019-10-21

## 2019-10-23 ENCOUNTER — HOSPITAL ENCOUNTER (OUTPATIENT)
Dept: GENERAL RADIOLOGY | Age: 64
Discharge: HOME | End: 2019-10-23
Payer: COMMERCIAL

## 2019-10-23 DIAGNOSIS — Z79.52 ON PREDNISONE THERAPY: ICD-10-CM

## 2019-10-23 PROCEDURE — 77080 DXA BONE DENSITY AXIAL: CPT

## 2019-11-04 DIAGNOSIS — G72.49 INFLAMMATORY MYOPATHY: Primary | ICD-10-CM

## 2019-11-04 DIAGNOSIS — G72.49 INFLAMMATORY MYOPATHY: ICD-10-CM

## 2019-11-04 LAB
ALBUMIN SERPL-MCNC: 4.4 G/DL (ref 3.4–5)
ALP BLD-CCNC: 64 U/L (ref 40–129)
ALT SERPL-CCNC: 69 U/L (ref 10–40)
AST SERPL-CCNC: 98 U/L (ref 15–37)
BILIRUB SERPL-MCNC: 1.3 MG/DL (ref 0–1)
BILIRUBIN DIRECT: 0.3 MG/DL (ref 0–0.3)
BILIRUBIN, INDIRECT: 1 MG/DL (ref 0–1)
C-REACTIVE PROTEIN: 2.5 MG/L (ref 0–5.1)
CREAT SERPL-MCNC: <0.5 MG/DL (ref 0.6–1.2)
GFR AFRICAN AMERICAN: >60
GFR NON-AFRICAN AMERICAN: >60
SEDIMENTATION RATE, ERYTHROCYTE: 5 MM/HR (ref 0–30)
TOTAL CK: 3777 U/L (ref 26–192)
TOTAL PROTEIN: 7.1 G/DL (ref 6.4–8.2)

## 2019-11-06 ENCOUNTER — OFFICE VISIT (OUTPATIENT)
Dept: RHEUMATOLOGY | Age: 64
End: 2019-11-06
Payer: COMMERCIAL

## 2019-11-06 VITALS
DIASTOLIC BLOOD PRESSURE: 86 MMHG | WEIGHT: 184 LBS | HEIGHT: 60 IN | SYSTOLIC BLOOD PRESSURE: 134 MMHG | BODY MASS INDEX: 36.12 KG/M2

## 2019-11-06 DIAGNOSIS — G72.0 STEROID-INDUCED MYOPATHY: ICD-10-CM

## 2019-11-06 DIAGNOSIS — Z12.9 CANCER SCREENING: ICD-10-CM

## 2019-11-06 DIAGNOSIS — T38.0X5A STEROID-INDUCED MYOPATHY: ICD-10-CM

## 2019-11-06 DIAGNOSIS — N39.0 URINARY TRACT INFECTION WITH HEMATURIA, SITE UNSPECIFIED: ICD-10-CM

## 2019-11-06 DIAGNOSIS — R31.9 URINARY TRACT INFECTION WITH HEMATURIA, SITE UNSPECIFIED: ICD-10-CM

## 2019-11-06 DIAGNOSIS — G72.49 INFLAMMATORY MYOPATHY: Primary | ICD-10-CM

## 2019-11-06 LAB — ALDOLASE: 69.9 U/L (ref 1.5–8.1)

## 2019-11-06 PROCEDURE — 99215 OFFICE O/P EST HI 40 MIN: CPT | Performed by: INTERNAL MEDICINE

## 2019-11-06 RX ORDER — MYCOPHENOLATE MOFETIL 250 MG/1
CAPSULE ORAL
Qty: 120 CAPSULE | Refills: 0 | Status: SHIPPED | OUTPATIENT
Start: 2019-11-06 | End: 2020-08-10 | Stop reason: CLARIF

## 2019-11-07 DIAGNOSIS — R74.8 ELEVATED CPK: ICD-10-CM

## 2019-11-07 RX ORDER — PREDNISONE 20 MG/1
TABLET ORAL
Qty: 30 TABLET | Refills: 0 | Status: SHIPPED | OUTPATIENT
Start: 2019-11-07 | End: 2020-08-10

## 2019-11-08 DIAGNOSIS — N39.0 URINARY TRACT INFECTION WITH HEMATURIA, SITE UNSPECIFIED: ICD-10-CM

## 2019-11-08 DIAGNOSIS — R31.9 URINARY TRACT INFECTION WITH HEMATURIA, SITE UNSPECIFIED: ICD-10-CM

## 2019-11-08 LAB
BACTERIA: ABNORMAL /HPF
BILIRUBIN URINE: NEGATIVE
BLOOD, URINE: ABNORMAL
CLARITY: ABNORMAL
COLOR: YELLOW
EPITHELIAL CELLS, UA: 1 /HPF (ref 0–5)
GLUCOSE URINE: NEGATIVE MG/DL
HYALINE CASTS: 4 /LPF (ref 0–8)
KETONES, URINE: NEGATIVE MG/DL
LEUKOCYTE ESTERASE, URINE: ABNORMAL
MICROSCOPIC EXAMINATION: YES
NITRITE, URINE: NEGATIVE
PH UA: 6.5 (ref 5–8)
PROTEIN UA: ABNORMAL MG/DL
RBC UA: 6 /HPF (ref 0–4)
SPECIFIC GRAVITY UA: 1.01 (ref 1–1.03)
URINE TYPE: ABNORMAL
UROBILINOGEN, URINE: 0.2 E.U./DL
WBC UA: 122 /HPF (ref 0–5)

## 2019-11-10 LAB
ORGANISM: ABNORMAL
URINE CULTURE, ROUTINE: ABNORMAL
URINE CULTURE, ROUTINE: ABNORMAL

## 2019-11-11 RX ORDER — NITROFURANTOIN 25; 75 MG/1; MG/1
100 CAPSULE ORAL 2 TIMES DAILY
Qty: 20 CAPSULE | Refills: 0 | Status: SHIPPED | OUTPATIENT
Start: 2019-11-11 | End: 2019-11-21

## 2019-11-15 DIAGNOSIS — R29.898 WEAKNESS OF BOTH LOWER EXTREMITIES: Primary | ICD-10-CM

## 2019-11-19 ENCOUNTER — HOSPITAL ENCOUNTER (OUTPATIENT)
Dept: MAMMOGRAPHY | Age: 64
Discharge: HOME | End: 2019-11-19
Payer: COMMERCIAL

## 2019-11-19 ENCOUNTER — HOSPITAL ENCOUNTER (OUTPATIENT)
Dept: NUCLEAR MEDICINE | Age: 64
Discharge: HOME | End: 2019-11-19
Payer: COMMERCIAL

## 2019-11-19 ENCOUNTER — APPOINTMENT (OUTPATIENT)
Dept: CT IMAGING | Age: 64
End: 2019-11-19
Payer: COMMERCIAL

## 2019-11-19 DIAGNOSIS — G72.49 INFLAMMATORY MYOPATHY: ICD-10-CM

## 2019-11-19 DIAGNOSIS — Z12.9 CANCER SCREENING: ICD-10-CM

## 2019-11-19 PROCEDURE — A9503 TC99M MEDRONATE: HCPCS | Performed by: INTERNAL MEDICINE

## 2019-11-19 PROCEDURE — 77063 BREAST TOMOSYNTHESIS BI: CPT

## 2019-11-19 PROCEDURE — 3430000000 HC RX DIAGNOSTIC RADIOPHARMACEUTICAL: Performed by: INTERNAL MEDICINE

## 2019-11-19 PROCEDURE — 78306 BONE IMAGING WHOLE BODY: CPT

## 2019-11-19 RX ORDER — TC 99M MEDRONATE 20 MG/10ML
25 INJECTION, POWDER, LYOPHILIZED, FOR SOLUTION INTRAVENOUS
Status: COMPLETED | OUTPATIENT
Start: 2019-11-19 | End: 2019-11-19

## 2019-11-19 RX ADMIN — TC 99M MEDRONATE 25 MILLICURIE: 20 INJECTION, POWDER, LYOPHILIZED, FOR SOLUTION INTRAVENOUS at 12:15

## 2019-11-25 RX ORDER — LISINOPRIL 20 MG/1
TABLET ORAL
Qty: 30 TABLET | Refills: 2 | Status: SHIPPED | OUTPATIENT
Start: 2019-11-25 | End: 2020-01-10 | Stop reason: SINTOL

## 2019-11-27 ENCOUNTER — OFFICE VISIT (OUTPATIENT)
Dept: RHEUMATOLOGY | Age: 64
End: 2019-11-27
Payer: COMMERCIAL

## 2019-11-27 ENCOUNTER — TELEPHONE (OUTPATIENT)
Dept: RHEUMATOLOGY | Age: 64
End: 2019-11-27

## 2019-11-27 VITALS
WEIGHT: 190 LBS | SYSTOLIC BLOOD PRESSURE: 122 MMHG | HEIGHT: 60 IN | BODY MASS INDEX: 37.3 KG/M2 | DIASTOLIC BLOOD PRESSURE: 78 MMHG

## 2019-11-27 DIAGNOSIS — G56.22 ENTRAPMENT OF LEFT ULNAR NERVE: ICD-10-CM

## 2019-11-27 DIAGNOSIS — Z79.899 HIGH RISK MEDICATION USE: ICD-10-CM

## 2019-11-27 DIAGNOSIS — M33.20 POLYMYOSITIS (HCC): Primary | ICD-10-CM

## 2019-11-27 DIAGNOSIS — R60.0 LEG EDEMA: ICD-10-CM

## 2019-11-27 LAB
ALBUMIN SERPL-MCNC: 4.7 G/DL (ref 3.4–5)
ALP BLD-CCNC: 56 U/L (ref 40–129)
ALT SERPL-CCNC: 39 U/L (ref 10–40)
AST SERPL-CCNC: 66 U/L (ref 15–37)
BASOPHILS ABSOLUTE: 0 K/UL (ref 0–0.2)
BASOPHILS RELATIVE PERCENT: 0.4 %
BILIRUB SERPL-MCNC: 1.5 MG/DL (ref 0–1)
BILIRUBIN DIRECT: 0.3 MG/DL (ref 0–0.3)
BILIRUBIN, INDIRECT: 1.2 MG/DL (ref 0–1)
C-REACTIVE PROTEIN: 3.2 MG/L (ref 0–5.1)
CREAT SERPL-MCNC: <0.5 MG/DL (ref 0.6–1.2)
EOSINOPHILS ABSOLUTE: 0.1 K/UL (ref 0–0.6)
EOSINOPHILS RELATIVE PERCENT: 0.5 %
GFR AFRICAN AMERICAN: >60
GFR NON-AFRICAN AMERICAN: >60
HCT VFR BLD CALC: 43.9 % (ref 36–48)
HEMOGLOBIN: 14.6 G/DL (ref 12–16)
LYMPHOCYTES ABSOLUTE: 1.7 K/UL (ref 1–5.1)
LYMPHOCYTES RELATIVE PERCENT: 13 %
MCH RBC QN AUTO: 30.2 PG (ref 26–34)
MCHC RBC AUTO-ENTMCNC: 33.3 G/DL (ref 31–36)
MCV RBC AUTO: 90.8 FL (ref 80–100)
MONOCYTES ABSOLUTE: 0.8 K/UL (ref 0–1.3)
MONOCYTES RELATIVE PERCENT: 5.7 %
NEUTROPHILS ABSOLUTE: 10.6 K/UL (ref 1.7–7.7)
NEUTROPHILS RELATIVE PERCENT: 80.4 %
PDW BLD-RTO: 15.9 % (ref 12.4–15.4)
PLATELET # BLD: 303 K/UL (ref 135–450)
PMV BLD AUTO: 8.6 FL (ref 5–10.5)
RBC # BLD: 4.84 M/UL (ref 4–5.2)
SEDIMENTATION RATE, ERYTHROCYTE: 5 MM/HR (ref 0–30)
TOTAL CK: 2883 U/L (ref 26–192)
TOTAL PROTEIN: 6.8 G/DL (ref 6.4–8.2)
WBC # BLD: 13.1 K/UL (ref 4–11)

## 2019-11-27 PROCEDURE — 99214 OFFICE O/P EST MOD 30 MIN: CPT | Performed by: INTERNAL MEDICINE

## 2019-11-27 RX ORDER — NITROFURANTOIN 25; 75 MG/1; MG/1
100 CAPSULE ORAL 2 TIMES DAILY
Qty: 20 CAPSULE | Refills: 0 | Status: SHIPPED | OUTPATIENT
Start: 2019-11-27 | End: 2019-12-07

## 2019-11-27 RX ORDER — FUROSEMIDE 40 MG/1
40 TABLET ORAL DAILY
Qty: 14 TABLET | Refills: 0 | Status: SHIPPED | OUTPATIENT
Start: 2019-11-27 | End: 2021-09-07 | Stop reason: ALTCHOICE

## 2019-11-29 LAB — ALDOLASE: 48.9 U/L (ref 1.5–8.1)

## 2019-12-01 RX ORDER — MYCOPHENOLATE MOFETIL 500 MG/1
TABLET ORAL
Qty: 120 TABLET | Refills: 1 | Status: SHIPPED | OUTPATIENT
Start: 2019-12-01 | End: 2020-01-24

## 2019-12-07 ENCOUNTER — HOSPITAL ENCOUNTER (EMERGENCY)
Age: 64
Discharge: HOME | End: 2019-12-07
Attending: EMERGENCY MEDICINE
Payer: COMMERCIAL

## 2019-12-07 VITALS
HEIGHT: 60 IN | TEMPERATURE: 98 F | DIASTOLIC BLOOD PRESSURE: 57 MMHG | SYSTOLIC BLOOD PRESSURE: 164 MMHG | HEART RATE: 96 BPM | BODY MASS INDEX: 37.3 KG/M2 | OXYGEN SATURATION: 94 % | WEIGHT: 190 LBS | RESPIRATION RATE: 18 BRPM
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
## 2019-12-07 PROCEDURE — 99284 EMERGENCY DEPT VISIT MOD MDM: CPT

## 2019-12-07 ASSESSMENT — PAIN DESCRIPTION - DESCRIPTORS: DESCRIPTORS: DULL

## 2019-12-07 ASSESSMENT — PAIN DESCRIPTION - FREQUENCY: FREQUENCY: CONTINUOUS

## 2019-12-07 ASSESSMENT — ENCOUNTER SYMPTOMS
SHORTNESS OF BREATH: 0
NAUSEA: 0
VOMITING: 0
ABDOMINAL PAIN: 0

## 2019-12-07 ASSESSMENT — PAIN DESCRIPTION - PAIN TYPE: TYPE: ACUTE PAIN

## 2019-12-07 ASSESSMENT — PAIN DESCRIPTION - LOCATION: LOCATION: HEAD

## 2019-12-24 ENCOUNTER — OFFICE VISIT (OUTPATIENT)
Dept: FAMILY MEDICINE CLINIC | Age: 64
End: 2019-12-24
Payer: COMMERCIAL

## 2019-12-24 VITALS
HEART RATE: 87 BPM | OXYGEN SATURATION: 97 % | WEIGHT: 191.4 LBS | DIASTOLIC BLOOD PRESSURE: 86 MMHG | SYSTOLIC BLOOD PRESSURE: 130 MMHG | BODY MASS INDEX: 37.38 KG/M2

## 2019-12-24 DIAGNOSIS — I10 BENIGN ESSENTIAL HTN: ICD-10-CM

## 2019-12-24 DIAGNOSIS — M33.20 POLYMYOSITIS (HCC): ICD-10-CM

## 2019-12-24 PROBLEM — R19.7 DIARRHEA DUE TO MALABSORPTION: Status: RESOLVED | Noted: 2019-08-21 | Resolved: 2019-12-24

## 2019-12-24 PROBLEM — K90.9 DIARRHEA DUE TO MALABSORPTION: Status: RESOLVED | Noted: 2019-08-21 | Resolved: 2019-12-24

## 2019-12-24 PROCEDURE — 99213 OFFICE O/P EST LOW 20 MIN: CPT | Performed by: FAMILY MEDICINE

## 2019-12-24 ASSESSMENT — ENCOUNTER SYMPTOMS
SINUS PRESSURE: 0
EYE REDNESS: 0
PHOTOPHOBIA: 0
TROUBLE SWALLOWING: 0
SORE THROAT: 0
SHORTNESS OF BREATH: 0
BLOOD IN STOOL: 0
EYE PAIN: 0
EYE DISCHARGE: 0
NAUSEA: 0
VOMITING: 0
EYE ITCHING: 0
CHEST TIGHTNESS: 0
ABDOMINAL DISTENTION: 0
VOICE CHANGE: 0
APNEA: 0
RECTAL PAIN: 0
BACK PAIN: 0
RHINORRHEA: 0
WHEEZING: 0
ORTHOPNEA: 0
ANAL BLEEDING: 0
COLOR CHANGE: 0
CHOKING: 0
CONSTIPATION: 0
DIARRHEA: 0
COUGH: 0
STRIDOR: 0
FACIAL SWELLING: 0
BLURRED VISION: 0
ABDOMINAL PAIN: 0

## 2020-01-02 ENCOUNTER — OFFICE VISIT (OUTPATIENT)
Dept: RHEUMATOLOGY | Age: 65
End: 2020-01-02
Payer: COMMERCIAL

## 2020-01-02 VITALS
WEIGHT: 192 LBS | SYSTOLIC BLOOD PRESSURE: 132 MMHG | DIASTOLIC BLOOD PRESSURE: 2 MMHG | HEIGHT: 60 IN | BODY MASS INDEX: 37.69 KG/M2

## 2020-01-02 DIAGNOSIS — Z79.899 HIGH RISK MEDICATION USE: ICD-10-CM

## 2020-01-02 LAB
ALBUMIN SERPL-MCNC: 4.3 G/DL (ref 3.4–5)
ALP BLD-CCNC: 51 U/L (ref 40–129)
ALT SERPL-CCNC: 15 U/L (ref 10–40)
AST SERPL-CCNC: 42 U/L (ref 15–37)
BASOPHILS ABSOLUTE: 0.1 K/UL (ref 0–0.2)
BASOPHILS RELATIVE PERCENT: 0.7 %
BILIRUB SERPL-MCNC: 1.1 MG/DL (ref 0–1)
BILIRUBIN DIRECT: <0.2 MG/DL (ref 0–0.3)
BILIRUBIN, INDIRECT: ABNORMAL MG/DL (ref 0–1)
C-REACTIVE PROTEIN: 2.4 MG/L (ref 0–5.1)
CREAT SERPL-MCNC: <0.5 MG/DL (ref 0.6–1.2)
EOSINOPHILS ABSOLUTE: 0.1 K/UL (ref 0–0.6)
EOSINOPHILS RELATIVE PERCENT: 1.2 %
GFR AFRICAN AMERICAN: >60
GFR NON-AFRICAN AMERICAN: >60
HCT VFR BLD CALC: 41.3 % (ref 36–48)
HEMOGLOBIN: 13.6 G/DL (ref 12–16)
LYMPHOCYTES ABSOLUTE: 1.1 K/UL (ref 1–5.1)
LYMPHOCYTES RELATIVE PERCENT: 14.7 %
MCH RBC QN AUTO: 30.4 PG (ref 26–34)
MCHC RBC AUTO-ENTMCNC: 33.1 G/DL (ref 31–36)
MCV RBC AUTO: 91.9 FL (ref 80–100)
MONOCYTES ABSOLUTE: 0.5 K/UL (ref 0–1.3)
MONOCYTES RELATIVE PERCENT: 5.9 %
NEUTROPHILS ABSOLUTE: 6 K/UL (ref 1.7–7.7)
NEUTROPHILS RELATIVE PERCENT: 77.5 %
PDW BLD-RTO: 15 % (ref 12.4–15.4)
PLATELET # BLD: 298 K/UL (ref 135–450)
PMV BLD AUTO: 9 FL (ref 5–10.5)
RBC # BLD: 4.49 M/UL (ref 4–5.2)
SEDIMENTATION RATE, ERYTHROCYTE: 5 MM/HR (ref 0–30)
TOTAL CK: 2425 U/L (ref 26–192)
TOTAL PROTEIN: 6.7 G/DL (ref 6.4–8.2)
WBC # BLD: 7.7 K/UL (ref 4–11)

## 2020-01-02 PROCEDURE — 99214 OFFICE O/P EST MOD 30 MIN: CPT | Performed by: INTERNAL MEDICINE

## 2020-01-02 RX ORDER — PREDNISONE 1 MG/1
TABLET ORAL
Qty: 180 TABLET | Refills: 0 | Status: SHIPPED | OUTPATIENT
Start: 2020-01-02 | End: 2020-03-18 | Stop reason: SDUPTHER

## 2020-01-02 NOTE — PROGRESS NOTES
[address] [place] [address] ) [phone] (F)      Dear Dr. Christi Conner MD:  Please find Rheumatology assessment. Thank you for giving me the opportunity to be involved in Jagdeep Paul's care and I look forward following Jagdeep Liz along with you. If you have any questions or concerns please feel free to reach me. Note is transcribed using voice recognition software. Inadvertent computerized transcription errors may be present. Patient identification: Gabriel Brownlee: [id number],53 y.o. Sex: female     A/P  Jagdeep Liz was seen today for follow-up. 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 tolerating medications well. Dry cough-believes that it might be from lisinopril, is going to talk to Dr. David Aparicio about that. Bilateral leg edema-takes Lasix as needed, gets better with mobility. 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 Symptoms of left ulnar entrapment-protective measures discussed. Improved. PFTs showed COPD changes.

## 2020-01-07 LAB — ALDOLASE: 25.9

## 2020-01-10 RX ORDER — LOSARTAN POTASSIUM 100 MG/1
100 TABLET ORAL DAILY
Qty: 30 TABLET | Refills: 3 | Status: SHIPPED | OUTPATIENT
Start: 2020-01-10 | End: 2020-03-18 | Stop reason: SDUPTHER

## 2020-01-15 ENCOUNTER — HOSPITAL ENCOUNTER (OUTPATIENT)
Dept: PHYSICAL THERAPY | Age: 65
Setting detail: THERAPIES SERIES
Discharge: HOME | End: 2020-01-15
Payer: COMMERCIAL

## 2020-01-15 PROCEDURE — 97112 NEUROMUSCULAR REEDUCATION: CPT

## 2020-01-15 PROCEDURE — 97530 THERAPEUTIC ACTIVITIES: CPT

## 2020-01-15 PROCEDURE — 97162 PT EVAL MOD COMPLEX 30 MIN: CPT

## 2020-01-15 NOTE — FLOWSHEET NOTE
still limited in walking and standing tolerance and continues to feel somewhat unsteady using a cane outside the home. She presents w/ mild balance deficits related to the weakness. Pt will benefit from skilled PT to improve strength, endurance and balance in order to return to previously independent and active level of function. Prognosis: [x] Good [] Fair  [] Poor    Patient Requires Follow-up: [x] Yes  [] No    Goals:  Short term goals  Time Frame for Short term goals: 4 weeks   Short term goal 1: Pt will demonstrate ability to follow HEP following instruction. Short term goal 2: Pt will improve 5 time sit to stand to 15 seconds for improved efficiency of mobility. Short term goal 3: Pt will improve TUG to 12 seconds for improved functional mobility. Long term goals  Time Frame for Long term goals : 8 weeks  Long term goal 1: Pt will negotiate 2 flights of stairs w/ rail and SPC mod I.   Long term goal 2: Pt will improve 5 time sit to stand to 12 sec (avg for age). Long term goal 3: Pt will improve Wheat to 53/56 to demonstrate reduced fall risk. Long term goal 4: Pt will perform transfers from all seat heights w/ minimal single UE support.    Long term goal 5: Pt will amb 1-2 blocks w/ SPC as needed, including curb and barrier negotiations mod I.     Plan:           [] Continue per plan of care [] Micha Ybarra current plan (see comments)  [x] Plan of care initiated [] Hold pending MD visit [] Discharge    Plan for Next Session:  Shoulder ROM assessment, initiate cardio and HEP for strengthening and shoulder stretching    Electronically signed by:  Ayan Reddy DPT

## 2020-01-15 NOTE — PROGRESS NOTES
decreased with:  Pain duration/pattern:     Contributing personal factors/relevant co-morbidities impacting treatment/outcome: chronicity of symptoms-2+ months, lacks experience with exercise, limited understanding for medical condition, involvement of multiple joints -B UE/LE. Other:       Objective  Activity Limitations: walking < 1 block w/ SPC, standing tolerance 15-30 min., no stairs- can go up some, but not down. Limited ability ability to transfer into tub, sleeping on couch, can't get in/out of bed, difficulty getting up to stand without hands. Challenged to lift gallon of milk. Challenged to pick things up from floor, sweeping/dustpain use not doing. Participation Restrictions: grocery- uses cart, limited driving, has not been out on Thursday w/ friends (can't get up from low toilet and can't sit on barstool)       PROM PROM AROM AROM MMT     Shoulder Right Left Right Left Right Left   Elevation             Flexion        3- 3-    Abduction        3- 3-    Extension             ER             IR             Scap.  Retraction             Elbow             Flexion        4+  4   Extension       4  4   Wrist         Flexion     4+ 4+   Extension     4+ 4   Supination         Pronation         Radial Deviation         Ulnar Deviation         Finger         Flexion         Extension         Abd/Add     4+ 4-        good good                   A / P ROM MMT     Hip Right Left Right Left   Flexion     3- 3-   Extension           Abduction           Adduction           ER      5  5   IR      4  4   Knee           Flexion     4+ 4   Extension     5 5   Ankle       DF   5 5   PF   >3- >=3-   EV       Inv       Trunk       Upper Abdominal       Lower Abdominal       Extension       SB                Tone: WNL  Spasticity: 1 slight beat clonus R > L ankle  Coordination: WFL alt toe taps  Proprioception: WNL B ankles  Sensation WNL per pt report   Dermatomes ( (+) increased sensation /  (-) decreased sensation)  Upper Extremity Right Left   Lower Extremity Right Left   C4        Upper Trap       L1       Inguinal Region       C5        Lateral Arm       L2    Ant. Medial Thigh       C6                Thumb       L3     Distal Ant. Thigh       C7           3rd Finger       L4 Medial Lower Thigh       C8            5th Finger       L5 2nd Toe/Dorsal Foot       T1     Medial Elbow       S1             Lateral Foot           Mobility:           supine ? sit: TBA  sit ? supine: TBA  roll R:TBA  roll L: TBA  w/c ? bed: mod I   bed ? w/c: mod I  sit  ? stand: mod I w/ B UE support  stand ? sit: mod I w/ B UE support    Gait: slightly decreased step height and length  TUG (timed up and go): requires significant UE support to stand  trial 1= 14 sec   trial 2= 14 sec  Avg.= 14 sec without AD     Balance:   Wheat/56    Posture: rounded shoulders, forward head, slumped posture  Outcome Measures:   5 time sit to stand: 19 sec from 18\" mat table w/ B UE support (unable to complete without UE support)   ABC scale : 23% confidence  UEFS: 40% ability   Assessment   Conditions Requiring Skilled Therapeutic Intervention: Body structures, Functions, Activity limitations: Decreased functional mobility;;Decreased strength;Decreased balance;Decreased high-level IADLs;Decreased coordination;Decreased ROM  Pt is 59 y.o. female w/ recent diagnosis of polymyositis. She presents w/ significant proximal > distal B UE and LE weakness that has gradually been improving since steroid treatment for past 2+ months. She continues to be significantly limited in ability to get up from a seat and reaching for items or lifting items. She has had gradual improvement, but is still limited in walking and standing tolerance and continues to feel somewhat unsteady using a cane outside the home. She presents w/ mild balance deficits related to the weakness.  Pt will benefit from skilled PT to improve strength, endurance and balance in order to return to previously independent and active level of function. Prognosis: Good   Clinical Presentation: evolving   Based on the sections above, the clinical decision making required for this  evaluation was moderate complexity. Reason for Severity Modifier: Tests: TUG, 5 time sit to stand, Wheat, Objective Measures and Clinical Judgement    HEP reviewed and issued written instructions. Requires PT Follow Up: Yes     Plan   Times per week: 2  Plan weeks: 8  Current Treatment Recommendations: Strengthening, Balance Training, Functional Mobility Training, Gait Training, Neuromuscular Re-education, Home Exercise Program, Safety Education & Training                       Goals  Short term goals  Time Frame for Short term goals: 4 weeks   Short term goal 1: Pt will demonstrate ability to follow HEP following instruction. Short term goal 2: Pt will improve 5 time sit to stand to 15 seconds for improved efficiency of mobility. Short term goal 3: Pt will improve TUG to 12 seconds for improved functional mobility. Long term goals  Time Frame for Long term goals : 8 weeks  Long term goal 1: Pt will negotiate 2 flights of stairs w/ rail and SPC mod I.   Long term goal 2: Pt will improve 5 time sit to stand to 12 sec (avg for age). Long term goal 3: Pt will improve Wheat to 53/56 to demonstrate reduced fall risk. Long term goal 4: Pt will perform transfers from all seat heights w/ minimal single UE support. Long term goal 5: Pt will amb 1-2 blocks w/ SPC as needed, including curb and barrier negotiations mod I.    Patient Goals   Patient goals : get close to normal as possible        Therapy Time   Individual Concurrent Group Co-treatment   Time In  1130         Time Out  1230         Minutes  [facility], PT, DPT

## 2020-01-15 NOTE — PLAN OF CARE
[x] Modalities (For modulating pain/tenderness/paresthesias, reducing swelling/inflammation/tightness, improving soft tissue extensibility, and/or to increase muscle tone/strength):     [] Ultrasound  [] Electrical Stimulation        [] Cervical Traction [] Lumbar Traction       [] Cold/hotpack [] Iontophoresis   Other:      []          []      Assessment:    Conditions Requiring Skilled Therapeutic Intervention: Body structures, Functions, Activity limitations: Decreased functional mobility;;Decreased strength;Decreased balance;Decreased high-level IADLs;Decreased coordination;Decreased ROM  Pt is 59 y.o. female w/ recent diagnosis of polymyositis. She presents w/ significant proximal > distal B UE and LE weakness that has gradually been improving since steroid treatment for past 2+ months. She continues to be significantly limited in ability to get up from a seat and reaching for items or lifting items. She has had gradual improvement, but is still limited in walking and standing tolerance and continues to feel somewhat unsteady using a cane outside the home. She presents w/ mild balance deficits related to the weakness. Pt will benefit from skilled PT to improve strength, endurance and balance in order to return to previously independent and active level of function. Goals:  Short term goals  Time Frame for Short term goals: 4 weeks   Short term goal 1: Pt will demonstrate ability to follow HEP following instruction. Short term goal 2: Pt will improve 5 time sit to stand to 15 seconds for improved efficiency of mobility. Short term goal 3: Pt will improve TUG to 12 seconds for improved functional mobility. Long term goals  Time Frame for Long term goals : 8 weeks  Long term goal 1: Pt will negotiate 2 flights of stairs w/ rail and SPC mod I.   Long term goal 2: Pt will improve 5 time sit to stand to 12 sec (avg for age).    Long term goal 3: Pt will improve Wheat to 53/56 to demonstrate reduced fall risk. Long term goal 4: Pt will perform transfers from all seat heights w/ minimal single UE support. Long term goal 5: Pt will amb 1-2 blocks w/ SPC as needed, including curb and barrier negotiations mod I. Frequency/Duration:  # Days per week: [] 1 day # Weeks: [] 1 week [] 5 weeks     [x] 2 days   [] 2 weeks [] 6 weeks     [] 3 days   [] 3 weeks [] 7 weeks     [] 4 days   [] 4 weeks [x] 8 weeks    Rehab Potential: [] Excellent [x] Good [] Fair  [] Poor       Electronically signed by:  Isha Haley, PT, DPT        If you have any questions or concerns, please don't hesitate to call.   Thank you for your referral.      Physician Signature:________________________________Date:__________________  By signing above, therapists plan is approved by physician

## 2020-01-20 ENCOUNTER — HOSPITAL ENCOUNTER (OUTPATIENT)
Dept: PHYSICAL THERAPY | Age: 65
Setting detail: THERAPIES SERIES
Discharge: HOME | End: 2020-01-20
Payer: COMMERCIAL

## 2020-01-20 PROCEDURE — 97110 THERAPEUTIC EXERCISES: CPT

## 2020-01-20 NOTE — FLOWSHEET NOTE
Physical Therapy Daily Treatment Note  Date:  2020    Patient Name:  Loreta Romero    :  1955  MRN: [id number]  Restrictions/Precautions:        Medical/Treatment Diagnosis Information:  Diagnosis: M33.20 (ICD-10-CM) - Polymyositis     B UE/LE weakness, impaired transfers and gait  Insurance/Certification information:  PT Insurance Information: [employer] [place] (20 PT visits per )  Physician Information:  Referring Practitioner: Dr. Miki Benton MD Follow-up: ?  Plan of care signed (Y/N): Y  Visit# / total visits:    Pain level: 0/10     Progress Note: []  Yes  [x]  No  Next due by: Visit #8 or 2/15/20      Subjective:  Pt reports she went up her outside stairs today without assist and has just started being able to type w/ all L fingers since this weekend. Objective:   Observation: Pt presents amb w/ SPC in R UE   Test measurements:     PROM     R L   Shoulder flexion 148 152   Shoulder extension      Shoulder abduction 145 134   Shoulder external rotation 75 78   Shoulder internal rotation 85 82   Elbow flexion     Elbow extension     Wrist flexion     Wrist extension       44 39      MMT     R L    Hip flexion     Hip extension  3- 3- slightly weaker than R   Hip abduction 3- 3-   Knee extension     Knee flexion 4+ 4+           Exercises:  Exercise/Equipment Resistance/Repetitions Other comments   nustep 6 min, L2 B UE, LE        Shoulder flex self stretch 20 sec x 2 supine and standing against wall     Shoulder abd self stretch 20 sec x 2 supine and standing against wall     pec stretch in doorway 20 sec x 2 each LE         Hip abd hooklying 10x 1    Prone lye 1-2 min. bridging 10 x 2 sec hold                               Other Therapeutic Activities:  Sit to stand- next  Sit to/from supine x 2, able to manage LE's w/ hands on pants  Supine to/from prone mod I. Pt reports she has not been able to do this due to fear of inability to roll back to supine. Home Exercise Program:     1/20/20: shoulder flex and abd self PROM in supine w/ cane or standing against wall, pec stretch in doorway, low rows, hip abd hooklying, bridging    Manual Treatments:      Modalities:      Time in:730  Time out:830    Timed Code Treatment Minutes:  TE 58    Total Treatment Minutes:  58    Treatment/Activity Tolerance:  [x] Patient tolerated treatment well [] Patient limited by fatigue  [] Patient limited by pain  [] Patient limited by other medical complications  [] Other:     Assessment: Assessed B shoulder ROM due to significant weakness found during eval. Pt w/ limitations that will benefit from self-stretching. Also added postural exercises and initiated proximal B LE strengthening. Pt tolerated all activities well including transitioning sit to/from supine without assist as well as supine to prone for the first time since her condition started. Pt will benefit from skilled PT to improve strength, endurance and balance in order to return to previously independent and active level of function. Prognosis: [x] Good [] Fair  [] Poor    Patient Requires Follow-up: [x] Yes  [] No    Goals:  Short term goals  Time Frame for Short term goals: 4 weeks   Short term goal 1: Pt will demonstrate ability to follow HEP following instruction. Short term goal 2: Pt will improve 5 time sit to stand to 15 seconds for improved efficiency of mobility. Short term goal 3: Pt will improve TUG to 12 seconds for improved functional mobility. Long term goals  Time Frame for Long term goals : 8 weeks  Long term goal 1: Pt will negotiate 2 flights of stairs w/ rail and SPC mod I.   Long term goal 2: Pt will improve 5 time sit to stand to 12 sec (avg for age). Long term goal 3: Pt will improve Wheat to 53/56 to demonstrate reduced fall risk. Long term goal 4: Pt will perform transfers from all seat heights w/ minimal single UE support.    Long term goal 5: Pt will amb 1-2 blocks w/ SPC as needed, including curb and barrier negotiations mod I.     Plan:           [x] Continue per plan of care [] Alter current plan (see comments)  [] Plan of care initiated [] Hold pending MD visit [] Discharge    Plan for Next Session:  Progress cardio and HEP for shoulder and LE strengthening, gait and balance    Electronically signed by:  Fredy Brambila, DPT

## 2020-01-22 ENCOUNTER — HOSPITAL ENCOUNTER (OUTPATIENT)
Dept: PHYSICAL THERAPY | Age: 65
Setting detail: THERAPIES SERIES
Discharge: HOME | End: 2020-01-22
Payer: COMMERCIAL

## 2020-01-22 PROCEDURE — 97110 THERAPEUTIC EXERCISES: CPT

## 2020-01-22 PROCEDURE — 97116 GAIT TRAINING THERAPY: CPT

## 2020-01-22 NOTE — FLOWSHEET NOTE
improve 5 time sit to stand to 12 sec (avg for age). Long term goal 3: Pt will improve Wheat to 53/56 to demonstrate reduced fall risk. Long term goal 4: Pt will perform transfers from all seat heights w/ minimal single UE support.    Long term goal 5: Pt will amb 1-2 blocks w/ SPC as needed, including curb and barrier negotiations mod I.     Plan:           [x] Continue per plan of care [] Bella Amtoniae current plan (see comments)  [] Plan of care initiated [] Hold pending MD visit [] Discharge    Plan for Next Session:  Progress cardio and HEP for shoulder and LE strengthening, sit to stand transfers, gait and balance    Electronically signed by:  Nae Yoon DPT

## 2020-01-24 RX ORDER — MYCOPHENOLATE MOFETIL 500 MG/1
TABLET ORAL
Qty: 120 TABLET | Refills: 1 | Status: SHIPPED | OUTPATIENT
Start: 2020-01-24 | End: 2020-03-18

## 2020-01-27 ENCOUNTER — HOSPITAL ENCOUNTER (OUTPATIENT)
Dept: PHYSICAL THERAPY | Age: 65
Setting detail: THERAPIES SERIES
Discharge: HOME | End: 2020-01-27
Payer: COMMERCIAL

## 2020-01-27 PROCEDURE — 97110 THERAPEUTIC EXERCISES: CPT

## 2020-01-27 PROCEDURE — 97530 THERAPEUTIC ACTIVITIES: CPT

## 2020-01-27 PROCEDURE — 97116 GAIT TRAINING THERAPY: CPT

## 2020-01-27 NOTE — FLOWSHEET NOTE
Physical Therapy Daily Treatment Note  Date:  2020    Patient Name:  Shagufta Smith    :  1955  MRN: [id number]  Restrictions/Precautions:        Medical/Treatment Diagnosis Information:  Diagnosis: M33.20 (ICD-10-CM) - Polymyositis     B UE/LE weakness, impaired transfers and gait  Insurance/Certification information:  PT Insurance Information: [facility] (20 PT visits per )  Physician Information:  Referring Practitioner: Dr. Rafaela Hutchison MD Follow-up: ?  Plan of care signed (Y/N): Y  Visit# / total visits:    Pain level: 5/10, low back and knee stiffness     Progress Note: []  Yes  [x]  No  Next due by: Visit #8 or 2/15/20      Subjective:  Pt reports she has been compliant w/ HEP. Was able to get up on a bar stool when out w/ friends for first time in 5 months. Pt was able to  ~20lb grand daughter a little bit more than previously. Pt also went to grocery, walked, loaded groceries into car, family unload groceries into house. Objective:   Observation: Pt presents amb w/ SPC in R UE   Test measurements:        Exercises:  Exercise/Equipment Resistance/Repetitions Other comments   nustep 8 min, L2-->3  REP 4-6/10 B UE, LE  Seat 7, arms 9        deferred to HEP   Deferred to HEP   Deferred to HEP   R/L single knee to chest 10x 1 Active assisted, L more assist to R. Rest break after 1st 3 reps. R/L SLR 10 x 1 Active assisted partial ROM to avoid excessive adduction   R/L hip abd hooklying 12 x 2    bridging 10 x 2, 2 sec hold Minimal ROM   marching 10 x 1  B UE support   Side stepping    10' x 5 each way Light UE support   Hamstring curls     10 x 1                Other Therapeutic Activities:    Sit to stand: 5x from 17\" high mat w/ significant B UE support on thighs and very widened stance. 5x from 19\" mat w/ single UE support  5x from 20\" mat without either UE support  Rest breaks in between due to brief SOB.      Sit to/from supine x 1, able to manage L LE's w/ hands on pants, R LE no assist    Gait:    Stair trainin\" Step-ups, 5x each with UE support, 5x each without UE support    TM: 0.8mph, B UE support,fatigued at 4 min w/ gradual reduction in TM speed in order to complete 5 min. RPE 7-8/10       Home Exercise Program:     20: shoulder flex and abd self PROM in supine w/ cane or standing against wall, pec stretch in doorway, low rows, hip abd hooklying, bridging  20: sidestepping, marching, step-ups (4\" step w/ min to no UE support)     Manual Treatments:      Modalities:      Timed Code Treatment Minutes:  TE 37; GT 10; TA 8    Total Treatment Minutes:  55    Treatment/Activity Tolerance:  [x] Patient tolerated treatment well [] Patient limited by fatigue  [] Patient limited by pain  [] Patient limited by other medical complications  [] Other:     Assessment: Pt demos good tolerance of initial HEP. She is challenged by proximal LE strengthening against gravity. She requires intermittent rest breaks due to fatigue and was limited in tolerance of TM training at end of session, but able to complete 1 min more at slower speed than last session. Pt reports gradually completing more functional tasks at home. Pt will benefit from skilled PT to improve strength, endurance and balance in order to return to previously independent and active level of function. Prognosis: [x] Good [] Fair  [] Poor    Patient Requires Follow-up: [x] Yes  [] No    Goals:  Short term goals  Time Frame for Short term goals: 4 weeks   Short term goal 1: Pt will demonstrate ability to follow HEP following instruction. Short term goal 2: Pt will improve 5 time sit to stand to 15 seconds for improved efficiency of mobility. Short term goal 3: Pt will improve TUG to 12 seconds for improved functional mobility.    Long term goals  Time Frame for Long term goals : 8 weeks  Long term goal 1: Pt will negotiate 2 flights of stairs w/ rail and SPC mod I.   Long term goal 2: Pt will improve 5 time sit to stand to 12 sec (avg for age). Long term goal 3: Pt will improve Wheat to 53/56 to demonstrate reduced fall risk. Long term goal 4: Pt will perform transfers from all seat heights w/ minimal single UE support.    Long term goal 5: Pt will amb 1-2 blocks w/ SPC as needed, including curb and barrier negotiations mod I.     Plan:           [x] Continue per plan of care [] Robert Estrella current plan (see comments)  [] Plan of care initiated [] Hold pending MD visit [] Discharge    Plan for Next Session:  Progress cardio and HEP for shoulder and LE strengthening, sit to stand transfers, gait and balance    Electronically signed by:  Ranjan Rodríguez DPT

## 2020-01-29 ENCOUNTER — HOSPITAL ENCOUNTER (OUTPATIENT)
Dept: PHYSICAL THERAPY | Age: 65
Setting detail: THERAPIES SERIES
Discharge: HOME | End: 2020-01-29
Payer: COMMERCIAL

## 2020-01-29 PROCEDURE — 97116 GAIT TRAINING THERAPY: CPT

## 2020-01-29 PROCEDURE — 97110 THERAPEUTIC EXERCISES: CPT

## 2020-01-29 NOTE — FLOWSHEET NOTE
//bars    Negotiated 4- 6\" steps x 5, B rails, consistently descends w/ reciprocal pattern, progressed to reciprocal ascent after 2nd rep. Also trialed B UE support on L rail to simulate home set-up, pt matt'd safe technique, but did not feel comfortable. TM: 0.8-1.0mph, B UE support, improved tolerance to 5 min w/ 1 min. Cool down. Home Exercise Program:     1/20/20: shoulder flex and abd self PROM in supine w/ cane or standing against wall, pec stretch in doorway, low rows, hip abd hooklying, bridging  1/22/20: sidestepping, marching, step-ups (4\" step w/ min to no UE support)     Manual Treatments:      Modalities:      Timed Code Treatment Minutes:  TE 42; GT 15    Total Treatment Minutes:  57    Treatment/Activity Tolerance:  [x] Patient tolerated treatment well [] Patient limited by fatigue  [] Patient limited by pain  [] Patient limited by other medical complications  [] Other:     Assessment: Pt talia good tolerance of there ex w/ gradual progression on reps, resistance or reducing amount of UE support for sit to stand. Negotiated 6\" stairs on stairway for the first time, 20 steps mod I. She increased time on TM to 6 min. At increased speed of 1.0mph. She requires intermittent rest breaks but overall breaks are getting shorter. Pt reports gradually completing more functional tasks at home. Pt will benefit from skilled PT to improve strength, endurance and balance in order to return to previously independent and active level of function. Prognosis: [x] Good [] Fair  [] Poor    Patient Requires Follow-up: [x] Yes  [] No    Goals:  Short term goals  Time Frame for Short term goals: 4 weeks   Short term goal 1: Pt will demonstrate ability to follow HEP following instruction. Short term goal 2: Pt will improve 5 time sit to stand to 15 seconds for improved efficiency of mobility. Short term goal 3: Pt will improve TUG to 12 seconds for improved functional mobility.    Long term

## 2020-02-03 ENCOUNTER — HOSPITAL ENCOUNTER (OUTPATIENT)
Dept: PHYSICAL THERAPY | Age: 65
Setting detail: THERAPIES SERIES
Discharge: HOME | End: 2020-02-03
Payer: COMMERCIAL

## 2020-02-03 PROCEDURE — 97116 GAIT TRAINING THERAPY: CPT

## 2020-02-03 PROCEDURE — 97110 THERAPEUTIC EXERCISES: CPT

## 2020-02-03 NOTE — FLOWSHEET NOTE
improve 5 time sit to stand to 12 sec (avg for age). Long term goal 3: Pt will improve Wheat to 53/56 to demonstrate reduced fall risk. Long term goal 4: Pt will perform transfers from all seat heights w/ minimal single UE support.    Long term goal 5: Pt will amb 1-2 blocks w/ SPC as needed, including curb and barrier negotiations mod I.     Plan:           [x] Continue per plan of care [] Magen Lute current plan (see comments)  [] Plan of care initiated [] Hold pending MD visit [] Discharge    Plan for Next Session:  Step-ups on 8\" step next  Progress cardio and HEP for shoulder and LE strengthening, sit to stand transfers, gait and balance    Electronically signed by:  Davin Faith DPT

## 2020-02-05 ENCOUNTER — HOSPITAL ENCOUNTER (OUTPATIENT)
Dept: PHYSICAL THERAPY | Age: 65
Setting detail: THERAPIES SERIES
Discharge: HOME | End: 2020-02-05
Payer: COMMERCIAL

## 2020-02-05 PROCEDURE — 97110 THERAPEUTIC EXERCISES: CPT

## 2020-02-05 PROCEDURE — 97116 GAIT TRAINING THERAPY: CPT

## 2020-02-05 NOTE — FLOWSHEET NOTE
Physical Therapy Daily Treatment Note  Date:  2020    Patient Name:  Cceelia Velazquez    :  1955  MRN: [id number]  Restrictions/Precautions:        Medical/Treatment Diagnosis Information:  Diagnosis: M33.20 (ICD-10-CM) - Polymyositis     B UE/LE weakness, impaired transfers and gait  Insurance/Certification information:  PT Insurance Information: Medical [place] (20 PT visits per )  Physician Information:  Referring Practitioner: Dr. James Lamb MD Follow-up: ?  Plan of care signed (Y/N): Y  Visit# / total visits:   Pain level: 4/10, L knee, L side of neck (reporting she slept on it wrong)       Progress Note: []  Yes  [x]  No  Next due by: Visit #8 or 2/15/20      Subjective:  Pt reports  she walked for ~ 4 hours at the [facility] yesterday w/ SPC. Legs a little tired today. Pt notes she can now manage her emergency brake in car w/ L LE where she could not previously. Objective:   Observation: Pt presents amb w/ SPC in R UE   Test measurements:         Exercises:  Exercise/Equipment Resistance/Repetitions Other comments   nustep 8 min, L3 (L4 next)   REP 2-3/10 B UE, LE  Seat 7, arms 10   Cervical stretching  Neck circles, upper trap stretch, shoulder rolls, 2-4 each Unsupported sit   deferred to HEP   Deferred to HEP   Deferred to HEP      R/L single knee to chest 10x 1 Decreased AROM for last 2-3 reps   R/L SLR 10 x 1 Decreased AROM for last 2-3 reps   R/L hip              abd hooklying 10x 2 Yellow  tband resistance at ankles   bridging 10 x 2, 3 sec hold Minimal ROM   Side stepping    10' x 5 each way, yellow tband  UE support, challenged to prevent hip ext rot. Hamstring curls     10 x 1 Toe taps 10x 2 each, 8\" step B UE support-->no UE support   SLS 26 and 27 sec each        Other Therapeutic Activities:    Sit to stand:   5x from 19\" mat without either UE support, very wide MEGAN  5x from 18\" mat w/ single UE on knee only   Rest breaks in between due to brief SOB. Sit to/from supine x 1, able to manage LE's without assist of UE's. Gait:    Stair trainin\" Step-ups, 5x 1 each with max UE support in //bars        TM: 1.0mph 2 min., 1.2mph for 6 min., 1.0mph for 2 minutes,  B UE support, improved tolerance to 9 min w/ 1 min. Cool down. Home Exercise Program:     20: shoulder flex and abd self PROM in supine w/ cane or standing against wall, pec stretch in doorway, low rows, hip abd hooklying, bridging  20: sidestepping, marching, step-ups (4\" step w/ min to no UE support)     Manual Treatments:      Modalities:      Timed Code Treatment Minutes: TE 41; GT 14    Total Treatment Minutes:  55    Treatment/Activity Tolerance:  [x] Patient tolerated treatment well [] Patient limited by fatigue  [] Patient limited by pain  [] Patient limited by other medical complications  [] Other:     Assessment: Continue to progress challenge of sit to stand, step ups and gait which pt is tolerating well. She is approaching 8\" steps but still requires max UE support and is tolerating 10 min on TM. Pt reports gradually completing more functional tasks at home. Pt will benefit from skilled PT to improve strength, endurance and balance in order to return to previously independent and active level of function. Prognosis: [x] Good [] Fair  [] Poor    Patient Requires Follow-up: [x] Yes  [] No    Goals:  Short term goals  Time Frame for Short term goals: 4 weeks   Short term goal 1: Pt will demonstrate ability to follow HEP following instruction. Short term goal 2: Pt will improve 5 time sit to stand to 15 seconds for improved efficiency of mobility. Short term goal 3: Pt will improve TUG to 12 seconds for improved functional mobility. Long term goals  Time Frame for Long term goals : 8 weeks  Long term goal 1: Pt will negotiate 2 flights of stairs w/ rail and SPC mod I.   Long term goal 2: Pt will improve 5 time sit to stand to 12 sec (avg for age).    Long term goal 3: Pt will improve Wheat to 53/56 to demonstrate reduced fall risk. Long term goal 4: Pt will perform transfers from all seat heights w/ minimal single UE support.    Long term goal 5: Pt will amb 1-2 blocks w/ SPC as needed, including curb and barrier negotiations mod I.     Plan:           [x] Continue per plan of care [] Dudley Hamman current plan (see comments)  [] Plan of care initiated [] Hold pending MD visit [] Discharge    Plan for Next Session:    Progress note next  Progress cardio and HEP for shoulder and LE strengthening, sit to stand transfers, gait and balance    Electronically signed by:  Ronald Bennett DPT

## 2020-02-10 ENCOUNTER — HOSPITAL ENCOUNTER (OUTPATIENT)
Dept: PHYSICAL THERAPY | Age: 65
Setting detail: THERAPIES SERIES
Discharge: HOME | End: 2020-02-10
Payer: COMMERCIAL

## 2020-02-10 PROCEDURE — 97530 THERAPEUTIC ACTIVITIES: CPT

## 2020-02-10 PROCEDURE — 97110 THERAPEUTIC EXERCISES: CPT

## 2020-02-10 NOTE — FLOWSHEET NOTE
Physical Therapy Daily Treatment Note  Date:  2/10/2020    Patient Name:  Altagracia Medrano    :  1955  MRN: [id number]  Restrictions/Precautions:        Medical/Treatment Diagnosis Information:  Diagnosis: M33.20 (ICD-10-CM) - Polymyositis     B UE/LE weakness, impaired transfers and gait  Insurance/Certification information:  PT Insurance Information: Medical [place] (20 PT visits per )  Physician Information:  Referring Practitioner: Dr. Lucero Sauceda MD Follow-up: ?  Plan of care signed (Y/N): Y  Visit# / total visits:   Pain level: 3-4/10, L knee, L side of neck (reporting she slept on it wrong)       Progress Note: [x]  Yes  []  No  Next due by: Visit #16 or 3/10/20    Subjective:  Pt reports  she went up and down the steps at [employer] without her cane without difficulty this weekend. Pt reports being able to open a new jar this weekend. Also notes she and other people are noticing improvements in her ability, able to  30lb grandchild from changing table, not from ground yet.      Objective:   Observation: Pt presents amb w/ SPC in R UE   Test measurements:      TUG (timed up and go):   trial 1= 12  trial 2= 11  Avg.= 11.5 sec no AD    5 time sit to stand: 18 sec w/ B UE support on knees only from 18\" mat table. (previously 19 sec w/ max UE support on table)      MMT     R L    Shoulder flexion 4- 4-   Shoulder abduction 4- 4-   Elbow flex 5 4+   Elbow ext 4+ 4   Wrist flex 5 4+   Wrist ext 4+ 3-   Finger abd/add 5 4+     good good        MMT     R L    Hip flexion 3-  3-   Knee extension 5 5   Knee flexion 4+ 4+   Ankle DF 5 5         Exercises:  Exercise/Equipment Resistance/Repetitions Other comments   nustep 8 min, L4   REP 2-3/10 B UE, LE  Seat 7, arms 9   Shoulder flex R/L 10x 2, each  1lb   Shoulder abd R/L 10 x 2 each 1lb, no weight 2nd set for increased ROM   Elbow ext R/L 10 x 2 Standing, red tband   Partial squats 10 x 2 B UE support,    Deferred to HEP Toe taps 10x 2 each, 8\" step B UE support-->no UE support         Other Therapeutic Activities:    Sit to stand:     5x from 18\" mat w/ single UE on knee only   Rest breaks in between due to brief SOB. Gait:    Stair trainin\" Step-ups, 5x 2 each with max UE support in //bars          Home Exercise Program:     20: shoulder flex and abd self PROM in supine w/ cane or standing against wall, pec stretch in doorway, low rows, hip abd hooklying, bridging  20: sidestepping, marching, step-ups (4\" step w/ min to no UE support)     Manual Treatments:      Modalities:      Timed Code Treatment Minutes: TE 41; TA 15    Total Treatment Minutes:  56    Treatment/Activity Tolerance:  [x] Patient tolerated treatment well [] Patient limited by fatigue  [] Patient limited by pain  [] Patient limited by other medical complications  [] Other:     Assessment: Pt met 2/3 ST goals, progressing towards 3 ST goals. Pt demonstrates significant improvement in B UE and LE strength. She demonstrates progress in all areas of functional mobility including sit to stand from progressively lower seats with less UE support, ability to negotiate more stairs and higher step heights as well as walking w/ increased velocity and less lateral lean. Pt will continue to benefit from skilled PT to improve strength, endurance and balance in order to return to previously independent and active level of function.         Prognosis: [x] Good [] Fair  [] Poor    Patient Requires Follow-up: [x] Yes  [] No    Goals:  Short term goals  Time Frame for Short term goals: 4 weeks   Short term goal 1: Pt will demonstrate ability to follow HEP following instruction. - Met  Short term goal 2: Pt will improve 5 time sit to stand to 15 seconds for improved efficiency of mobility. -Progressing, using less UE support and slightly faster   Short term goal 3: Pt will improve TUG to 12 seconds for improved functional mobility. - Met  Long term goals  Time Frame for Long term goals : 8 weeks  Long term goal 1: Pt will negotiate 2 flights of stairs (8\" step height) w/ rail and SPC mod I. -Progressing, did 3-4, 8\" steps w/ rail w/ difficulty. Long term goal 2: Pt will improve 5 time sit to stand to 12 sec (avg for age). Long term goal 3: Pt will improve Wheat to 53/56 to demonstrate reduced fall risk. Long term goal 4: Pt will perform transfers from all seat heights w/ minimal single UE support. Long term goal 5: Pt will amb 1-2 blocks w/ SPC as needed, including curb and barrier negotiations mod I.-Progressing, walking >1 block, not requiring assist on curbs or hills.       Plan:           [x] Continue per plan of care [] Alter current plan (see comments)  [] Plan of care initiated [] Hold pending MD visit [] Discharge    Plan for Next Session:    Progress cardio and HEP for shoulder and LE strengthening, sit to stand transfers, gait and balance    Electronically signed by:  Brijesh Javier DPT

## 2020-02-12 ENCOUNTER — APPOINTMENT (OUTPATIENT)
Dept: PHYSICAL THERAPY | Age: 65
End: 2020-02-12
Payer: COMMERCIAL

## 2020-02-17 ENCOUNTER — HOSPITAL ENCOUNTER (OUTPATIENT)
Dept: PHYSICAL THERAPY | Age: 65
Setting detail: THERAPIES SERIES
Discharge: HOME | End: 2020-02-17
Payer: COMMERCIAL

## 2020-02-17 PROCEDURE — 97116 GAIT TRAINING THERAPY: CPT

## 2020-02-17 PROCEDURE — 97110 THERAPEUTIC EXERCISES: CPT

## 2020-02-17 NOTE — FLOWSHEET NOTE
Physical Therapy Daily Treatment Note  Date:  2020    Patient Name:  Gus Pichardo    :  1955  MRN: [id number]  Restrictions/Precautions:        Medical/Treatment Diagnosis Information:  Diagnosis: M33.20 (ICD-10-CM) - Polymyositis     B UE/LE weakness, impaired transfers and gait  Insurance/Certification information:  PT Insurance Information: Medical [place] (20 PT visits per )  Physician Information:  Referring Practitioner: Dr. Rudi Nix MD Follow-up: ?  Plan of care signed (Y/N): Y  Visit# / total visits:   Pain level: 4-5/10, L knee, B upper traps      Progress Note: []  Yes  [x]  No  Next due by: Visit #16 or 3/10/20      Subjective:  Pt reports being tired today from cleaning out closets at home yesterday. Made shoulders sore and L knee still a little stiff. Pt able to negotiate a flight of 5\" stairs over the weekend w/ her cane. Objective:   Observation: Pt presents amb w/ SPC in R UE   Test measurements:         Exercises:  Exercise/Equipment Resistance/Repetitions Other comments   nustep 8 min, L4  REP 2-3/10 B UE, LE  Seat 7, arms 10   Cervical stretching  upper trap stretch- 30 sec x 2 each Unsupported sit         Deferred to HEP      R/L single knee to chest 5 x 4 each  Decreased AROM for last set   R/L SLR 5 x 3 each Decreased AROM for last set   R/L hip              abd hooklying 10x 2 Yellow  tband resistance at ankles   bridging 10 x 2, 3 sec hold Minimal ROM   marching 10 x 2 B UE support, progressed to no UE support    Side stepping    10' x 4 each way, yellow tband  UE support, challenged to prevent hip ext rot. Hamstring curls     10 x 1 Toe taps 10x 2 each, 8\" step B UE support-->no UE support         Other Therapeutic Activities:    Sit to stand:  5x from 19\" mat without either UE support, wide MEGAN  5x from 18\" mat w/ single UE on knee only    5x from 17\" high mat w/ moderate B UE support on thighs widened stance.    Rest breaks in between due term goal 4: Pt will perform transfers from all seat heights w/ minimal single UE support.    Long term goal 5: Pt will amb 1-2 blocks w/ SPC as needed, including curb and barrier negotiations mod I.     Plan:           [x] Continue per plan of care [] Solomon Terrell current plan (see comments)  [] Plan of care initiated [] Hold pending MD visit [] Discharge    Plan for Next Session:    Progress cardio and HEP for shoulder and LE strengthening, sit to stand transfers, gait and balance    Electronically signed by:  Marco A Hoang DPT

## 2020-02-19 ENCOUNTER — HOSPITAL ENCOUNTER (OUTPATIENT)
Dept: PHYSICAL THERAPY | Age: 65
Setting detail: THERAPIES SERIES
Discharge: HOME | End: 2020-02-19
Payer: COMMERCIAL

## 2020-02-19 PROCEDURE — 97110 THERAPEUTIC EXERCISES: CPT

## 2020-02-19 PROCEDURE — 97116 GAIT TRAINING THERAPY: CPT

## 2020-02-21 NOTE — PROGRESS NOTES
w/ minimal single UE support. Long term goal 5: Pt will amb 1-2 blocks w/ SPC as needed, including curb and barrier negotiations mod I.-Progressing, walking >1 block, not requiring assist on curbs or hills. Frequency/Duration:  # Days per week: [] 1 day # Weeks: [] 1 week [] 4 weeks      [x] 2 days? [] 2 weeks [] 5 weeks      [] 3 days   [x] 3 weeks [] 6 weeks     Rehab Potential: [] Excellent [x] Good [] Fair  [] Poor     Goal Status:  [] Achieved [] Partially Achieved  [] Not Achieved     Patient Status: [x] Continue per initial plan of Care     [] Patient now discharged     [] Additional visits requested, Please re-certify for additional visits:      Requested frequency/duration:  X/week for weeks    Electronically signed by:  Pao Ricks, PT, DPT    If you have any questions or concerns, please don't hesitate to call.   Thank you for your referral.    Physician Signature:________________________________Date:__________________  By signing above, therapists plan is approved by physician

## 2020-02-24 ENCOUNTER — HOSPITAL ENCOUNTER (OUTPATIENT)
Dept: PHYSICAL THERAPY | Age: 65
Setting detail: THERAPIES SERIES
Discharge: HOME | End: 2020-02-24
Payer: COMMERCIAL

## 2020-02-24 PROCEDURE — 97116 GAIT TRAINING THERAPY: CPT

## 2020-02-24 PROCEDURE — 97110 THERAPEUTIC EXERCISES: CPT

## 2020-02-24 NOTE — FLOWSHEET NOTE
Physical Therapy Daily Treatment Note  Date:  2020    Patient Name:  Imtiaz Austin    :  1955  MRN: [id number]  Restrictions/Precautions:        Medical/Treatment Diagnosis Information:  Diagnosis: M33.20 (ICD-10-CM) - Polymyositis     B UE/LE weakness, impaired transfers and gait  Insurance/Certification information:  PT Insurance Information: Medical [place] (20 PT visits per calendar)  Physician Information:  Referring Practitioner: Dr. Pamela Darnell MD Follow-up: ?  Plan of care signed (Y/N): Y  Visit# / total visits:   Pain level: 3/10, L knee tight     Progress Note: []  Yes  [x]  No  Next due by: Visit #16 or 3/10/20      Subjective:  Pt asked to not do resisted side stepping due to having more pain/tightness in lateral L knee over the weekend. Objective:   Observation: Pt presents amb w/ SPC in R UE   Test measurements:    Assessed L ITband due to c/o lateral knee pain/tightness following last session. No TTP at entire ITband or lateral L knee. No loss of ROM or tightness detected w/ manual ITband stretch insidelying. Pt sustained a fall many years ago and reports her L knee has bothered her ever since then. Recommended she get further ortho evaluations if continues w/ knee pain. Exercises:  Exercise/Equipment Resistance/Repetitions Other comments   nustep 8 min, L4  RPE 7-8/10  B UE, LE  Seat 7, arms 10   Cervical stretching  upper trap stretch- 30 sec x 2 each Unsupported sit        Partial squats 10 x 1 B UE support, VC for form    Deferred to HEP      R/L single knee to chest 10 x 1, 5 x 2 each  Decreased AROM for last set   R/L SLR 5 x 3 each Decreased AROM for last set   R/L hip abd hooklying 10x 2 Held resistance due to lateral knee pain   bridging 10 x 2, 3 sec hold Minimal ROM   marching 10 x 2  no UE support, added alt arm swings    Side stepping    10' x 4 each way,   UE support, challenged to prevent hip ext rot.    Hamstring curls     10 x 2 B UE support

## 2020-02-26 ENCOUNTER — HOSPITAL ENCOUNTER (OUTPATIENT)
Dept: PHYSICAL THERAPY | Age: 65
Setting detail: THERAPIES SERIES
Discharge: HOME | End: 2020-02-26
Payer: COMMERCIAL

## 2020-02-26 PROCEDURE — 97110 THERAPEUTIC EXERCISES: CPT

## 2020-02-26 PROCEDURE — 97116 GAIT TRAINING THERAPY: CPT

## 2020-02-26 NOTE — FLOWSHEET NOTE
Physical Therapy Daily Treatment Note  Date:  2020    Patient Name:  Lyla Palacios    :  1955  MRN: [id number]  Restrictions/Precautions:        Medical/Treatment Diagnosis Information:  Diagnosis: M33.20 (ICD-10-CM) - Polymyositis     B UE/LE weakness, impaired transfers and gait  Insurance/Certification information:  PT Insurance Information: Medical [place] (20 PT visits per )  Physician Information:  Referring Practitioner: Dr. Lupe Fu MD Follow-up: ?  Plan of care signed (Y/N): Y  Visit# / total visits:   Pain level: 3/10, L knee tight     Progress Note: []  Yes  [x]  No  Next due by: Visit #16 or 3/10/20      Subjective:  Pt reports being a little tired from being out late last night. L knee a little stiff across anterior portion, lateral pain better from [place]. Objective:   Observation: Pt presents amb w/ SPC in R UE   Test measurements:         Exercises:  Exercise/Equipment Resistance/Repetitions Other comments   nustep 9 min, L4  trialed intervals during 2nd half: 1 min (80spm) x 1 min (60spm) for total 4 min. RPE 7-8/10  B UE, LE  Seat 7, arms 9   Cervical stretching  upper trap stretch- 30 sec x 2 each Unsupported sit        Partial squats 10 x 1 B UE support, VC for form    Deferred to HEP      R/L single knee to chest 10 x 2 each  Improve hip flex ROM. Pt very winded    R/L SLR 5 x 3 each Decreased L AROM for last set due to fatigue   R/L hip abd hooklying 15x 2 Held resistance due to lateral knee pain   bridging 10 x 2, 3 sec hold Improving ROM   marching 10 x 2  no UE support, added alt arm swings    Side stepping    10' x 5 each way,   UE support, challenged to prevent hip ext rot.    Hamstring curls     10 x 2 B UE support Toe taps 10x 2 each, 8\" step B UE support-->no UE support         Other Therapeutic Activities:    Sit to stand:  5x from 19\" mat without either UE support, wide MEGAN  5x from 18\" mat without either UE support, wide MEGAN    5x from 17\" seconds for improved functional mobility. - Met  Long term goals  Time Frame for Long term goals : 8 weeks  Long term goal 1: Pt will negotiate 2 flights of stairs (8\" step height) w/ rail and SPC mod I. -Progressing, did 3-4, 8\" steps w/ rail w/ difficulty. Long term goal 2: Pt will improve 5 time sit to stand to 12 sec (avg for age). Long term goal 3: Pt will improve Wheat to 53/56 to demonstrate reduced fall risk. Long term goal 4: Pt will perform transfers from all seat heights w/ minimal single UE support. Long term goal 5: Pt will amb 1-2 blocks w/ SPC as needed, including curb and barrier negotiations mod I.-Progressing, walking >1 block, not requiring assist on curbs or hills.     Plan:           [x] Continue per plan of care [] Alter current plan (see comments)  [] Plan of care initiated [] Hold pending MD visit [] Discharge    Plan for Next Session:  Plan to decrease freq to 1x/week now  Progress nustep intervals, progress TM time and speed,  shoulder and LE strengthening, sit to stand transfers    Electronically signed by:  Keerthi Hdz DPT

## 2020-03-02 ENCOUNTER — OFFICE VISIT (OUTPATIENT)
Dept: RHEUMATOLOGY | Age: 65
End: 2020-03-02
Payer: MEDICARE

## 2020-03-02 VITALS
DIASTOLIC BLOOD PRESSURE: 86 MMHG | HEIGHT: 60 IN | WEIGHT: 194 LBS | BODY MASS INDEX: 38.09 KG/M2 | SYSTOLIC BLOOD PRESSURE: 134 MMHG

## 2020-03-02 DIAGNOSIS — Z79.899 HIGH RISK MEDICATION USE: ICD-10-CM

## 2020-03-02 LAB
A/G RATIO: 2.1 (ref 1.1–2.2)
ALBUMIN SERPL-MCNC: 4.6 G/DL (ref 3.4–5)
ALP BLD-CCNC: 63 U/L (ref 40–129)
ALT SERPL-CCNC: 30 U/L (ref 10–40)
ANION GAP SERPL CALCULATED.3IONS-SCNC: 17 MMOL/L (ref 3–16)
AST SERPL-CCNC: 65 U/L (ref 15–37)
BASOPHILS ABSOLUTE: 0 K/UL (ref 0–0.2)
BASOPHILS RELATIVE PERCENT: 0.4 %
BILIRUB SERPL-MCNC: 1 MG/DL (ref 0–1)
BILIRUBIN URINE: NEGATIVE
BLOOD, URINE: NEGATIVE
BUN BLDV-MCNC: 15 MG/DL (ref 7–20)
C-REACTIVE PROTEIN: 2.3 MG/L (ref 0–5.1)
CALCIUM SERPL-MCNC: 9.6 MG/DL (ref 8.3–10.6)
CHLORIDE BLD-SCNC: 101 MMOL/L (ref 99–110)
CLARITY: CLEAR
CO2: 23 MMOL/L (ref 21–32)
COLOR: YELLOW
CREAT SERPL-MCNC: <0.5 MG/DL (ref 0.6–1.2)
EOSINOPHILS ABSOLUTE: 0 K/UL (ref 0–0.6)
EOSINOPHILS RELATIVE PERCENT: 0.2 %
EPITHELIAL CELLS, UA: 1 /HPF (ref 0–5)
GFR AFRICAN AMERICAN: >60
GFR NON-AFRICAN AMERICAN: >60
GLOBULIN: 2.2 G/DL
GLUCOSE BLD-MCNC: 93 MG/DL (ref 70–99)
GLUCOSE URINE: NEGATIVE MG/DL
HCT VFR BLD CALC: 42.5 % (ref 36–48)
HEMOGLOBIN: 13.8 G/DL (ref 12–16)
HYALINE CASTS: 0 /LPF (ref 0–8)
KETONES, URINE: NEGATIVE MG/DL
LEUKOCYTE ESTERASE, URINE: ABNORMAL
LYMPHOCYTES ABSOLUTE: 1 K/UL (ref 1–5.1)
LYMPHOCYTES RELATIVE PERCENT: 12.1 %
MCH RBC QN AUTO: 29.6 PG (ref 26–34)
MCHC RBC AUTO-ENTMCNC: 32.5 G/DL (ref 31–36)
MCV RBC AUTO: 91 FL (ref 80–100)
MICROSCOPIC EXAMINATION: YES
MONOCYTES ABSOLUTE: 0.3 K/UL (ref 0–1.3)
MONOCYTES RELATIVE PERCENT: 3.1 %
NEUTROPHILS ABSOLUTE: 6.9 K/UL (ref 1.7–7.7)
NEUTROPHILS RELATIVE PERCENT: 84.2 %
NITRITE, URINE: NEGATIVE
PDW BLD-RTO: 14.1 % (ref 12.4–15.4)
PH UA: 7 (ref 5–8)
PLATELET # BLD: 268 K/UL (ref 135–450)
PMV BLD AUTO: 9.1 FL (ref 5–10.5)
POTASSIUM SERPL-SCNC: 4.5 MMOL/L (ref 3.5–5.1)
PROTEIN UA: NEGATIVE MG/DL
RBC # BLD: 4.67 M/UL (ref 4–5.2)
RBC UA: 1 /HPF (ref 0–4)
SEDIMENTATION RATE, ERYTHROCYTE: 2 MM/HR (ref 0–30)
SODIUM BLD-SCNC: 141 MMOL/L (ref 136–145)
SPECIFIC GRAVITY UA: 1 (ref 1–1.03)
TOTAL CK: 2828 U/L (ref 26–192)
TOTAL PROTEIN: 6.8 G/DL (ref 6.4–8.2)
URINE TYPE: ABNORMAL
UROBILINOGEN, URINE: 0.2 E.U./DL
WBC # BLD: 8.2 K/UL (ref 4–11)
WBC UA: 1 /HPF (ref 0–5)

## 2020-03-02 PROCEDURE — 99214 OFFICE O/P EST MOD 30 MIN: CPT | Performed by: INTERNAL MEDICINE

## 2020-03-02 NOTE — PROGRESS NOTES
after reviewing labs. Agree with exercises as tolerated. She is aware of myositis emergencies. Due for colonoscopy. Bone scan, mammogram-no suspicion of malignancy. DEXA scan 12/23/2019 L-spine-  Normal, left hip -0.4, femoral neck -1.4. Continue calcium and vitamin D supplementation. Patient had long list of questions all were answered in my best capacity. Patient indicates understanding and agrees with the management plan. I reviewed patient's history, referral documents and electronic medical records. Time spent 45 minutes, >25 minutes was spent explaining diagnosis, monitoring, medications, prognosis, course of the disease. #######################################################################    Adartylvov-fiomgi-el for inflammatory myositis-polymyositis. Interval changes-  She is very pleased on current regimen of medications as well as exercises. Muscle strength is much better in her lower extremities, almost back to normal.  Is able to do stairs without stopping. She is able to walking treadmill at the pace of 3 and  Able to talk to therapist without any shortness of breath or cough. Overall, she is very pleased with the therapy. No flares last seen. Tolerating medications well. She denies any diplopia, dysphagia. Raynaud's phenomena is persistent, is able to manage with conservative measures. No tissue loss. Lately, left knee has been bothering her some no swelling. All other review of systems are negative.       Past Medical History:   Diagnosis Date    Benign essential HTN 3/7/2019    GERD (gastroesophageal reflux disease)      Past Surgical History:   Procedure Laterality Date    BREAST BIOPSY  1990    benign    MUSCLE BIOPSY Left 10/8/2019    LEFT DELTOID MUSCLE BIOPSY, LEFT QUADRICEP MUSCLE [facility]     Socioeconomic History    Marital status:      Spouse name: Not on file    Number of children: 2    Years predniSONE (DELTASONE) 20 MG tablet TAKE 1 tab po daily (Patient taking differently: 15 mg TAKE 1 tab po daily) 30 tablet 0    mycophenolate (CELLCEPT) 250 MG capsule Take 1 tab po BID x 1 weeks,  2 tab po BID thereafter 120 capsule 0    Omega-3 Fatty Acids (FISH OIL) 1000 MG CAPS Take 3,000 mg by mouth 3 times daily      B COMPLEX-C PO Take by mouth      Multiple Minerals-Vitamins (CALCIUM-MAGNESIUM-ZINC-D3 PO) Take by mouth      Nutritional Supplements (VITAMIN D BOOSTER PO) Take by mouth      pantoprazole (PROTONIX) 20 MG tablet Take 1 tablet by mouth every morning (before breakfast) 30 tablet 5     No current facility-administered medications for this visit. No Known Allergies    PHYSICAL EXAM:    Vitals:    /86   Ht 5' (1.524 m)   Wt 194 lb (88 kg)   BMI 37.89 kg/m²   General appearance/ Psychiatric: well nourished, and well groomed, normal judgement, alert, appears stated age and cooperative. MKS: I do not appreciate any synovitis, swelling, tenderness in any joints in upper or lower extremities. Muscle strength -normal muscle strength in her biceps, triceps, and distal muscles in upper extremities. She is able to get up from sitting position without any assistance, which she was not able to do in the past.  She walks with normal gait. Muscle strength is normal.  Neck flexors are normal.   extraocular muscles are normal.  DTRs normal biceps as well as knee. Spine - normal exam.  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 Mild periungual changes. No rashes, no induration or skin thickening or nodules. No evidence ischemia or deformities noted in digits or nails. Chest - No added sounds, equal air entry   Heart S1-S2 regular, trace pedal edema. No calf tenderness.     DATA:   Lab Results   Component Value Date    WBC 7.7 01/02/2020    HGB 13.6 01/02/2020    HCT 41.3 01/02/2020    MCV 91.9 01/02/2020     01/02/2020         Chemistry Component Value Date/Time     08/30/2019 1323    K 4.7 08/30/2019 1323    CL 98 (L) 08/30/2019 1323    CO2 25 08/30/2019 1323    BUN 14 08/30/2019 1323    CREATININE <0.5 (L) 01/02/2020 1023        Component Value Date/Time    CALCIUM 9.7 08/30/2019 1323    ALKPHOS 51 01/02/2020 1023    AST 42 (H) 01/02/2020 1023    ALT 15 01/02/2020 1023    BILITOT 1.1 (H) 01/02/2020 1023          No results found for: [facility]  Lab Results   Component Value Date    SEDRATE 5 01/02/2020     Lab Results   Component Value Date    CRP 2.4 01/02/2020     Lab Results   Component Value Date    JOSE Negative 09/11/2019    SEDRATE 5 01/02/2020     Lab Results   Component Value Date    CKTOTAL 2,425 (H) 01/02/2020     Lab Results   Component Value Date    TSH 4.93 (H) 08/21/2019     Lab Results   Component Value Date    VITD25 24.6 (L) 04/29/2019         Radiology Review:        A/P- See above.

## 2020-03-03 LAB — ANTI-NUCLEAR ANTIBODY (ANA): NEGATIVE

## 2020-03-03 RX ORDER — HYDROXYCHLOROQUINE SULFATE 200 MG/1
200 TABLET, FILM COATED ORAL 2 TIMES DAILY
Qty: 60 TABLET | Refills: 2 | Status: SHIPPED | OUTPATIENT
Start: 2020-03-03 | End: 2020-03-18 | Stop reason: SDUPTHER

## 2020-03-04 ENCOUNTER — HOSPITAL ENCOUNTER (OUTPATIENT)
Dept: PHYSICAL THERAPY | Age: 65
Setting detail: THERAPIES SERIES
Discharge: HOME | End: 2020-03-04
Payer: MEDICARE

## 2020-03-04 LAB — ALDOLASE: 23.9 U/L (ref 1.5–8.1)

## 2020-03-04 PROCEDURE — 97110 THERAPEUTIC EXERCISES: CPT

## 2020-03-04 NOTE — FLOWSHEET NOTE
Physical Therapy Daily Treatment Note  Date:  3/4/2020    Patient Name:  Yeimi Neil    :  1955  MRN: [id number]  Restrictions/Precautions:        Medical/Treatment Diagnosis Information:  Diagnosis: M33.20 (ICD-10-CM) - Polymyositis     B UE/LE weakness, impaired transfers and gait  Insurance/Certification information:  PT Insurance Information: [employer] [place] (20 PT visits per )  Physician Information:  Referring Practitioner: Dr. Luis Avila MD Follow-up: ?  Plan of care signed (Y/N): Y  Visit# / total visits:   Pain level: 3/10, L knee and hip tight     Progress Note: []  Yes  [x]  No  Next due by: Visit #16 or 3/10/20      Subjective: Pt reports L knee and hip a little stiff across anterior and lateral portion. pt reports able to negotiate 10 steps reciprocally with no cane at [employer] and railing on the left and no use of cane all day on Monday. Reports compliance with HEP. Objective:   Observation: Pt presents amb w/ SPC in R UE   Test measurements:        Exercises:   Exercise/Equipment Resistance/Repetitions Other comments   nustep 9 min, L4  trialed intervals during 2nd half: 1 min (80spm) x 1 min (60spm) for total 4 min. RPE 4-6/10  B UE, LE  Seat 7, arms 9   Cervical stretching  upper trap stretch- 30 sec x 2 each Unsupported sit        Deferred to HEP      R/L single knee to chest 10 x 2 each  Improve hip flex ROM. Pt very winded during second set    R/L SLR 5 x 3 each Decreased L AROM for last set due to fatigue; use of hands under gluts for support. R/L hip abd hooklying 15x 2 Held resistance due to lateral knee pain   bridging 10 x 2, 3 sec hold Improving ROM; very winded    marching 10 x 2  no UE support, added alt arm swings    Side stepping    10' x 5 each way,   UE support, challenged to prevent hip ext rot.    Hamstring curls     10 x 2 B UE support Toe taps 10x 2 each, 8\" step B UE support-->no UE support         Other Therapeutic Activities:    Sit to -Progressing, using less UE support and slightly faster   Short term goal 3: Pt will improve TUG to 12 seconds for improved functional mobility. - Met  Long term goals  Time Frame for Long term goals : 8 weeks  Long term goal 1: Pt will negotiate 2 flights of stairs (8\" step height) w/ rail and SPC mod I. -Progressing, did 3-4, 8\" steps w/ rail w/ difficulty. Long term goal 2: Pt will improve 5 time sit to stand to 12 sec (avg for age). Long term goal 3: Pt will improve Wheat to 53/56 to demonstrate reduced fall risk. Long term goal 4: Pt will perform transfers from all seat heights w/ minimal single UE support. Long term goal 5: Pt will amb 1-2 blocks w/ SPC as needed, including curb and barrier negotiations mod I.-Progressing, walking >1 block, not requiring assist on curbs or hills. Plan:           [x] Continue per plan of care [] Alter current plan (see comments)  [] Plan of care initiated [] Hold pending MD visit [] Discharge    Plan for Next Session:  Continue freq 1x/week now as planning to join [facility]  Progress nustep intervals, progress TM time and speed,  shoulder and LE strengthening, sit to stand transfers    Electronically signed by:  Santiago Crouch  Physical Therapist was present, directed the patient's care, made skilled judgement, and was responsible for assessment and treatment of the patient.      Td Hahn, PT, DPT

## 2020-03-09 ENCOUNTER — HOSPITAL ENCOUNTER (OUTPATIENT)
Dept: PHYSICAL THERAPY | Age: 65
Setting detail: THERAPIES SERIES
Discharge: HOME | End: 2020-03-09
Payer: MEDICARE

## 2020-03-09 PROCEDURE — 97110 THERAPEUTIC EXERCISES: CPT

## 2020-03-09 PROCEDURE — 97530 THERAPEUTIC ACTIVITIES: CPT

## 2020-03-09 NOTE — FLOWSHEET NOTE
Physical Therapy Daily Treatment Note  Date:  3/9/2020    Patient Name:  Kurtis Bowser    :  1955  MRN: [id number]  Restrictions/Precautions:        Medical/Treatment Diagnosis Information:  Diagnosis: M33.20 (ICD-10-CM) - Polymyositis     B UE/LE weakness, impaired transfers and gait  Insurance/Certification information:  PT Insurance Information: [employer] [place] (20 PT visits per )  Physician Information:  Referring Practitioner: Dr. Marquis Avelar MD Follow-up: ?  Plan of care signed (Y/N): Y  Visit# / total visits:   Pain level: 2-310, L knee       Progress Note: []  Yes  [x]  No  Next due by: Visit #16 or 3/10/20      Subjective: Pt reported that she fell on Thursday landing on her B knees and had to crawl to the couch to pull self up to standing. No pain in L knee after fall. She reported no use of cane at [employer] for two hours with no pain. Reports compliance with HEP. Objective:   Observation: Pt presents amb w/ SPC in R UE   Test measurements:        Exercises:   Exercise/Equipment Resistance/Repetitions Other comments   nustep 9 min, L4  trialed intervals during 2nd half: 1 min (80spm) x 1 min (60spm) for total 4 min. RPE 4-6/10  B UE, LE  Seat 7, arms 9        Deferred to HEP      R/L single knee to chest 10 x 2 each  Improve hip flex ROM. Pt very winded during second set    R/L SLR 5 x 3 each Decreased L AROM for last set due to fatigue; VC eccentric control. R/L hip abd hooklying 15x 2 Held resistance due to fatigue    bridging 10 x 2, 3 sec hold Improving ROM; very winded    marching 10 x 2  no UE support, added alt arm swings    Side stepping    10' x 5 each way, yellow tband  UE support, challenged to prevent hip ext rot.    Hamstring curls     10 x 2 B UE support VC no trunk flexion Toe taps 10x 2 each, 8\" step B UE support-->no UE support, B 1lb ankle weights         Other Therapeutic Activities:    Sit to stand:  5x from 19\" mat without either UE support, wide MEGAN  5x from 18\" mat without either UE support, wide MEGAN   5x from 17\" high mat, widened MEGAN VC for eccentric control  Rest breaks in between due to brief SOB. Pt requesting exercises to improve floor transfer. Reviewed current exercises applicable to that transfer. Pt declined practice of floor transfers today due to minor soreness, will plan to attempt next session. Gait:    Stair trainin\" Step-ups, 5x 2 each with max UE support in parallel bars on second set due to fatigue first set with 1lb ankle weights        TM: 1.3- 1. 5mph for 7 min. + 1 min. Cool down, B UE support,           Home Exercise Program:     20: shoulder flex and abd self PROM in supine w/ cane or standing against wall, pec stretch in doorway, low rows, hip abd hooklying, bridging  20: sidestepping, marching, step-ups (4\" step w/ min to no UE support)     Manual Treatments:      Modalities:      Timed Code Treatment Minutes: TE 50; TA 10    Total Treatment Minutes:  60    Treatment/Activity Tolerance:  [x] Patient tolerated treatment well [x] Patient limited by fatigue  [] Patient limited by pain  [] Patient limited by other medical complications  [] Other:      Assessment: Pt demonstrated improved endurance by tolerating increased intensity and duration of stair negotiation. Required VC for proper body mechanics during step up activities due to an increase in reliance on her UE's for support and lack of eccentric control of hamstrings. Tolerated increase in intensity of stair training and lateral movements with resistance. Pt reported no increase in pain post treatment session. Pt will benefit from skilled PT to improve strength, endurance and balance in order to return to previously independent and active level of function.             Prognosis: [x] Good [] Fair  [] Poor    Patient Requires Follow-up: [x] Yes  [] No    Goals:  Short term goals  Time Frame for Short term goals: 4 weeks   Short term goal 1: Pt will demonstrate ability to follow HEP following instruction. - Met  Short term goal 2: Pt will improve 5 time sit to stand to 15 seconds for improved efficiency of mobility. -Progressing, using less UE support and slightly faster   Short term goal 3: Pt will improve TUG to 12 seconds for improved functional mobility. - Met  Long term goals  Time Frame for Long term goals : 8 weeks  Long term goal 1: Pt will negotiate 2 flights of stairs (8\" step height) w/ rail and SPC mod I. -Progressing, did 3-4, 8\" steps w/ rail w/ difficulty. Long term goal 2: Pt will improve 5 time sit to stand to 12 sec (avg for age). Long term goal 3: Pt will improve Wheat to 53/56 to demonstrate reduced fall risk. Long term goal 4: Pt will perform transfers from all seat heights w/ minimal single UE support. Long term goal 5: Pt will amb 1-2 blocks w/ SPC as needed, including curb and barrier negotiations mod I.-Progressing, walking >1 block, not requiring assist on curbs or hills. Plan:           [x] Continue per plan of care [] Alter current plan (see comments)  [] Plan of care initiated [] Hold pending MD visit [] Discharge    Plan for Next Session:  Floor transfers  Progress nustep intervals, progress TM time and speed,  shoulder and LE strengthening, sit to stand transfers    Electronically signed by:  Albertina Cruz  Physical Therapist was present, directed the patient's care, made skilled judgement, and was responsible for assessment and treatment of the patient.      Mony Clifford, PT, DPT

## 2020-03-16 ENCOUNTER — HOSPITAL ENCOUNTER (OUTPATIENT)
Dept: PHYSICAL THERAPY | Age: 65
Setting detail: THERAPIES SERIES
Discharge: HOME | End: 2020-03-16
Payer: MEDICARE

## 2020-03-18 RX ORDER — LOSARTAN POTASSIUM 100 MG/1
100 TABLET ORAL DAILY
Qty: 90 TABLET | Refills: 3 | Status: SHIPPED | OUTPATIENT
Start: 2020-03-18 | End: 2020-05-07 | Stop reason: SDUPTHER

## 2020-03-18 RX ORDER — HYDROXYCHLOROQUINE SULFATE 200 MG/1
200 TABLET, FILM COATED ORAL 2 TIMES DAILY
Qty: 180 TABLET | Refills: 2 | Status: SHIPPED | OUTPATIENT
Start: 2020-03-18 | End: 2020-12-17 | Stop reason: SDUPTHER

## 2020-03-18 RX ORDER — PREDNISONE 1 MG/1
TABLET ORAL
Qty: 180 TABLET | Refills: 0 | Status: SHIPPED | OUTPATIENT
Start: 2020-03-18 | End: 2020-03-23

## 2020-03-19 NOTE — DISCHARGE SUMMARY
Outpatient Physical Therapy Phone: [phone] Fax: [phone]    Discharge Date: 3/19/20 (pt last seen 3/9/20)    To: Dr. Rudi Nix    From: Thierno Concepcion, PT, DPT  Patient: Gus Pichardo     : 1955 MRN: [id number]  Medical/Treatment Diagnosis Information:  · Diagnosis: M33.20 (ICD-10-CM) - Polymyositis   ·   B UE/LE weakness, impaired transfers and gait    Physical Therapy Discharge Note    Time Period for Report:  1/15/20-3/9/20    Total Visits to date:   15   Cancels/No-shows to date: 2    Plan of Care/Treatment to date:  [x] Therapeutic Exercise  [x] Therapeutic Activity   [x] Gait Training  [x] Neuromuscular Re-education  [x] Manual Therapy  [x] Modalities:         [] Ultrasound  [] Electrical Stimulation            [] Cervical Traction [] Lumbar Traction    ? [] Cold/hotpack [] Iontophoresis   Comments:    [x] Pt did not return for final 2 scheduled follow-up visits due to pt having risk factors for COVID19. Pain (Eval) 0   Pain (D/C) 0     Functional Outcome used:     Functional Outcome (Eval) TUG 14 sec; Wheat 48/56; 5 sec sit to astand 19 sec   Functional Outcome (D/C)  As of 2/10/20 TUG 11.5 sec; 5 time sit to stand 18 sec (reduced UE support compared to eval)     Other significant findings as of 2/10/20:    MMT       R L    Shoulder flexion 4- 4-   Shoulder abduction 4- 4-   Elbow flex 5 4+   Elbow ext 4+ 4   Wrist flex 5 4+   Wrist ext 4+ 3-   Finger abd/add 5 4+     good good           MMT       R L    Hip flexion 3-  3-   Knee extension 5 5   Knee flexion 4+ 4+   Ankle DF 5 5     Progress towards goals:  As of 2/10/20:   Pt met 2/3 ST goals and is progressing towards 1/3 ST goals. Pt demonstrates significant improvement in B UE and LE strength.  She demonstrates progress in all areas of functional mobility including sit to stand from progressively lower seats with less UE support, ability to negotiate more stairs and higher step heights as well as walking w/ increased velocity and less lateral lean. Pt will continue to benefit from skilled PT to improve strength, endurance and balance in order to return to previously independent and active level of function. As of 3/9/10: Pt making good progress, nearly meeting all goals. Was planning to progress functional sit to stand and step ups as much as possible and upgrade HEP in final 2 visits. Called pt to confirm D/C and updated HEP via email. See below. Pt may benefit from more PT in the future depending on functional status when restrictions due to [address] are lifted. Access Code: [email or web address]   URL: [email or web address]   Date: 03/19/2020   Prepared by: Kevin Dahl     Exercises   Lateral Step Up with Counter Support - 10 reps - 3 sets - 1x daily - 5x weekly   Standing Knee Flexion AROM with Chair Support - 10 reps - 3 sets - 1x daily - 7x weekly     Goals:   Short term goals  Time Frame for Short term goals: 4 weeks   Short term goal 1: Pt will demonstrate ability to follow HEP following instruction. - Met  Short term goal 2: Pt will improve 5 time sit to stand to 15 seconds for improved efficiency of mobility. -Progressing, using less UE support and slightly faster   Short term goal 3: Pt will improve TUG to 12 seconds for improved functional mobility. - Met  Long term goals  Time Frame for Long term goals : 8 weeks  Long term goal 1: Pt will negotiate 2 flights of stairs (8\" step height) w/ rail and SPC mod I. -Not met, did 3-4, 8\" steps w/ rail w/ difficulty. Long term goal 2: Pt will improve 5 time sit to stand to 12 sec (avg for age). Not met  Long term goal 3: Pt will improve Wheat to 53/56 to demonstrate reduced fall risk. Not met  Long term goal 4: Pt will perform transfers from all seat heights w/ minimal single UE support.  Not met  Long term goal 5: Pt will amb 1-2 blocks w/ SPC as needed, including curb and barrier negotiations mod I.-Not met, walking >1 block, not requiring assist on curbs or jeff.       Goal Status:  [] Achieved [x] Partially Achieved  [] Not Achieved     Patient Status: [x] Patient now discharged      Electronically signed by:  Jakub Rhodes, PT, DPT

## 2020-03-23 ENCOUNTER — APPOINTMENT (OUTPATIENT)
Dept: PHYSICAL THERAPY | Age: 65
End: 2020-03-23
Payer: MEDICARE

## 2020-03-23 RX ORDER — PREDNISONE 1 MG/1
TABLET ORAL
Qty: 60 TABLET | Refills: 2 | Status: SHIPPED | OUTPATIENT
Start: 2020-03-23 | End: 2020-09-01

## 2020-05-07 RX ORDER — LOSARTAN POTASSIUM 100 MG/1
100 TABLET ORAL DAILY
Qty: 90 TABLET | Refills: 3 | Status: SHIPPED | OUTPATIENT
Start: 2020-05-07 | End: 2021-07-19

## 2020-05-11 ENCOUNTER — VIRTUAL VISIT (OUTPATIENT)
Dept: RHEUMATOLOGY | Age: 65
End: 2020-05-11
Payer: MEDICARE

## 2020-05-11 PROCEDURE — 99214 OFFICE O/P EST MOD 30 MIN: CPT | Performed by: INTERNAL MEDICINE

## 2020-05-11 NOTE — PROGRESS NOTES
2020    TELEHEALTH EVALUATION -- Audio/Visual (During MPPMV-56 public health emergency)    HPI:    Tressa Juarez (:  1955) has requested an audio/video evaluation for the following concern(s):  Polymyositis and generalized osteoarthritis. She is doing fairly well in terms of her muscle weakness, states that symptoms are about the same, is able to carry out ADLs, still has trouble in going up and down the stairs especially in her basement. Is no longer doing physical therapy because of pandemic, has been walking half hour a day. Continues to take CellCept, Plaquenil and 10 mg of prednisone daily. No overt flares. Tolerating medications well. Denies any diplopia, dysphagia, focal muscle weakness, GI upset, infections, cough or shortness of breath. All other review of systems are negative. Review of Systems    Prior to Visit Medications    Medication Sig Taking?  Authorizing Provider   losartan (COZAAR) 100 MG tablet Take 1 tablet by mouth daily  Cynthia Becerra MD   predniSONE (DELTASONE) 5 MG tablet TAKE 1 TABLET BY MOUTH TWICE A DAY  Nathalie Grey MD   mycophenolate (CELLCEPT) 500 MG tablet TAKE 2 TABLETS BY MOUTH TWICE A DAY  Nathalie Grey MD   hydroxychloroquine (PLAQUENIL) 200 MG tablet Take 1 tablet by mouth 2 times daily  Nathalie Grey MD   furosemide (LASIX) 40 MG tablet Take 1 tablet by mouth daily In AM  Nathalie Grey MD   predniSONE (DELTASONE) 20 MG tablet TAKE 1 tab po daily  Patient taking differently: 15 mg TAKE 1 tab po daily  Nathalie Grey MD   mycophenolate (CELLCEPT) 250 MG capsule Take 1 tab po BID x 1 weeks,  2 tab po BID thereafter  Nathalie Grey MD   Omega-3 Fatty Acids (FISH OIL) 1000 MG CAPS Take 3,000 mg by mouth 3 times daily  Historical Provider, MD   B COMPLEX-C PO Take by mouth  Historical Provider, MD   Multiple Minerals-Vitamins (CALCIUM-MAGNESIUM-ZINC-D3 PO) Take by mouth  Historical Provider, MD   Nutritional Supplements (VITAMIN D BOOSTER PO) Take by mouth  Historical Provider, MD   pantoprazole (PROTONIX) 20 MG tablet Take 1 tablet by mouth every morning (before breakfast)  Marina Luz MD       Social History     Tobacco Use    Smoking status: Former Smoker     Packs/day: 2.00     Years: 15.00     Pack years: 30.00     Types: Cigarettes     Last attempt to quit: 3/20/2005     Years since quitting: 15.1    Smokeless tobacco: Never Used   Substance Use Topics    Alcohol use: Yes     Frequency: 4 or more times a week     Drinks per session: 1 or 2     Binge frequency: Never     Comment: 2 per night    Drug use: Never        No Known Allergies    PHYSICAL EXAMINATION:  [ INSTRUCTIONS:  \"[x]\" Indicates a positive item  \"[]\" Indicates a negative item  -- DELETE ALL ITEMS NOT EXAMINED]  Vital Signs: (As obtained by patient/caregiver or practitioner observation)    Blood pressure-  Heart rate-    Respiratory rate-    Temperature-  Pulse oximetry-     Constitutional: [x] Appears well-developed and well-nourished [x] No apparent distress      [] Abnormal-   Mental status  [x] Alert and awake  [x] Oriented to person/place/time [x]Able to follow commands      Eyes:  EOM    []  Normal  [] Abnormal-  Sclera  []  Normal  [] Abnormal -         Discharge []  None visible  [] Abnormal -    HENT:   [x] Normocephalic, atraumatic. [] Abnormal   [] Mouth/Throat: Mucous membranes are moist.     External Ears [] Normal  [] Abnormal-     Neck: [] No visualized mass     Pulmonary/Chest: [x] Respiratory effort normal.  [x] No visualized signs of difficulty breathing or respiratory distress        [] Abnormal-      Musculoskeletal:   [] Normal gait with no signs of ataxia         [] Normal range of motion of neck        [] Abnormal-no abnormal findings appreciated. Able to get up from sitting position in the chair without assistance. Cannot squat or get up from the floor.       Neurological:        [] No Facial Asymmetry (Cranial nerve 7 motor function) (limited exam to video visit)          [] No gaze palsy        [] Abnormal-         Skin:        [x] No significant exanthematous lesions or discoloration noted on facial skin         [] Abnormal-            Psychiatric:       [] Normal Affect [] No Hallucinations        [] Abnormal-     Other pertinent observable physical exam findings-     ASSESSMENT/PLAN:  Ar Crane was seen today for follow-up. Diagnoses and all orders for this visit:    Polymyositis ([address]  -     CBC Auto Differential; Future  -     Comprehensive Metabolic Panel; Future  -     C-Reactive Protein; Future  -     Sedimentation Rate; Future  -     CK; Future  -     Aldolase; Future    High risk medication use  -     CBC Auto Differential; Future  -     Comprehensive Metabolic Panel; Future  -     C-Reactive Protein; Future  -     Sedimentation Rate; Future  -     CK; Future  -     Aldolase; Future      Polymyositis-seems to be stable clinically. Will check labs as above to monitor medications and disease activity. Reduce prednisone 5 mg a day, continue CellCept 1 g twice daily and hydroxychloroquine 200 mg twice daily. Call us with worsening symptoms, follow-up in 3 months. No follow-ups on file. Zack Murry is a 72 y.o. female being evaluated by a Virtual Visit (video visit) encounter to address concerns as mentioned above. A caregiver was present when appropriate. Due to this being a TeleHealth encounter (During DVN-44 public health emergency), evaluation of the following organ systems was limited: Vitals/Constitutional/EENT/Resp/CV/GI//MS/Neuro/Skin/Heme-Lymph-Imm. Pursuant to the emergency declaration under the [address] [place] [address], [address] [place] [address] authority and the [employer] Act, this Virtual Visit was conducted with patient's (and/or legal guardian's) consent, to reduce the patient's risk of exposure to COVID-19 and provide necessary medical care.

## 2020-05-13 LAB
A/G RATIO: 2 (ref 1.1–2.2)
ALBUMIN SERPL-MCNC: 4.3 G/DL (ref 3.4–5)
ALP BLD-CCNC: 52 U/L (ref 40–129)
ALT SERPL-CCNC: 26 U/L (ref 10–40)
ANION GAP SERPL CALCULATED.3IONS-SCNC: 10 MMOL/L (ref 3–16)
AST SERPL-CCNC: 47 U/L (ref 15–37)
BASOPHILS ABSOLUTE: 0.1 K/UL (ref 0–0.2)
BASOPHILS RELATIVE PERCENT: 0.6 %
BILIRUB SERPL-MCNC: 1.1 MG/DL (ref 0–1)
BUN BLDV-MCNC: 10 MG/DL (ref 7–20)
C-REACTIVE PROTEIN: 1 MG/L (ref 0–5.1)
CALCIUM SERPL-MCNC: 9.6 MG/DL (ref 8.3–10.6)
CHLORIDE BLD-SCNC: 100 MMOL/L (ref 99–110)
CO2: 28 MMOL/L (ref 21–32)
CREAT SERPL-MCNC: <0.5 MG/DL (ref 0.6–1.2)
EOSINOPHILS ABSOLUTE: 0.3 K/UL (ref 0–0.6)
EOSINOPHILS RELATIVE PERCENT: 2.7 %
GFR AFRICAN AMERICAN: >60
GFR NON-AFRICAN AMERICAN: >60
GLOBULIN: 2.2 G/DL
GLUCOSE BLD-MCNC: 88 MG/DL (ref 70–99)
HCT VFR BLD CALC: 44.5 % (ref 36–48)
HEMOGLOBIN: 14.5 G/DL (ref 12–16)
LYMPHOCYTES ABSOLUTE: 3.1 K/UL (ref 1–5.1)
LYMPHOCYTES RELATIVE PERCENT: 32.1 %
MCH RBC QN AUTO: 29.4 PG (ref 26–34)
MCHC RBC AUTO-ENTMCNC: 32.5 G/DL (ref 31–36)
MCV RBC AUTO: 90.7 FL (ref 80–100)
MONOCYTES ABSOLUTE: 0.7 K/UL (ref 0–1.3)
MONOCYTES RELATIVE PERCENT: 7.3 %
NEUTROPHILS ABSOLUTE: 5.6 K/UL (ref 1.7–7.7)
NEUTROPHILS RELATIVE PERCENT: 57.3 %
PDW BLD-RTO: 14.4 % (ref 12.4–15.4)
PLATELET # BLD: 323 K/UL (ref 135–450)
PMV BLD AUTO: 8.4 FL (ref 5–10.5)
POTASSIUM SERPL-SCNC: 4 MMOL/L (ref 3.5–5.1)
RBC # BLD: 4.91 M/UL (ref 4–5.2)
SEDIMENTATION RATE, ERYTHROCYTE: 3 MM/HR (ref 0–30)
SODIUM BLD-SCNC: 138 MMOL/L (ref 136–145)
TOTAL CK: 2561 U/L (ref 26–192)
TOTAL PROTEIN: 6.5 G/DL (ref 6.4–8.2)
WBC # BLD: 9.8 K/UL (ref 4–11)

## 2020-05-15 LAB — ALDOLASE: 20.6 U/L (ref 1.5–8.1)

## 2020-06-16 RX ORDER — MYCOPHENOLATE MOFETIL 500 MG/1
TABLET ORAL
Qty: 360 TABLET | Refills: 0 | Status: SHIPPED | OUTPATIENT
Start: 2020-06-16 | End: 2020-09-02

## 2020-08-10 ENCOUNTER — VIRTUAL VISIT (OUTPATIENT)
Dept: RHEUMATOLOGY | Age: 65
End: 2020-08-10
Payer: MEDICARE

## 2020-08-10 LAB
A/G RATIO: 2.2 (ref 1.1–2.2)
ALBUMIN SERPL-MCNC: 4.6 G/DL (ref 3.4–5)
ALP BLD-CCNC: 54 U/L (ref 40–129)
ALT SERPL-CCNC: 25 U/L (ref 10–40)
ANION GAP SERPL CALCULATED.3IONS-SCNC: 14 MMOL/L (ref 3–16)
AST SERPL-CCNC: 45 U/L (ref 15–37)
BASOPHILS ABSOLUTE: 0 K/UL (ref 0–0.2)
BASOPHILS RELATIVE PERCENT: 0.5 %
BILIRUB SERPL-MCNC: 0.9 MG/DL (ref 0–1)
BUN BLDV-MCNC: 15 MG/DL (ref 7–20)
C-REACTIVE PROTEIN: 1.2 MG/L (ref 0–5.1)
CALCIUM SERPL-MCNC: 9.6 MG/DL (ref 8.3–10.6)
CHLORIDE BLD-SCNC: 102 MMOL/L (ref 99–110)
CO2: 24 MMOL/L (ref 21–32)
CREAT SERPL-MCNC: 0.5 MG/DL (ref 0.6–1.2)
EOSINOPHILS ABSOLUTE: 0.1 K/UL (ref 0–0.6)
EOSINOPHILS RELATIVE PERCENT: 1.5 %
GFR AFRICAN AMERICAN: >60
GFR NON-AFRICAN AMERICAN: >60
GLOBULIN: 2.1 G/DL
GLUCOSE BLD-MCNC: 101 MG/DL (ref 70–99)
HCT VFR BLD CALC: 42.8 % (ref 36–48)
HEMOGLOBIN: 14 G/DL (ref 12–16)
LYMPHOCYTES ABSOLUTE: 2 K/UL (ref 1–5.1)
LYMPHOCYTES RELATIVE PERCENT: 24.8 %
MCH RBC QN AUTO: 29.6 PG (ref 26–34)
MCHC RBC AUTO-ENTMCNC: 32.7 G/DL (ref 31–36)
MCV RBC AUTO: 90.5 FL (ref 80–100)
MONOCYTES ABSOLUTE: 0.5 K/UL (ref 0–1.3)
MONOCYTES RELATIVE PERCENT: 6.1 %
NEUTROPHILS ABSOLUTE: 5.5 K/UL (ref 1.7–7.7)
NEUTROPHILS RELATIVE PERCENT: 67.1 %
PDW BLD-RTO: 13.5 % (ref 12.4–15.4)
PLATELET # BLD: 264 K/UL (ref 135–450)
PMV BLD AUTO: 8.6 FL (ref 5–10.5)
POTASSIUM SERPL-SCNC: 4.3 MMOL/L (ref 3.5–5.1)
RBC # BLD: 4.73 M/UL (ref 4–5.2)
SEDIMENTATION RATE, ERYTHROCYTE: 1 MM/HR (ref 0–30)
SODIUM BLD-SCNC: 140 MMOL/L (ref 136–145)
TOTAL PROTEIN: 6.7 G/DL (ref 6.4–8.2)
WBC # BLD: 8.2 K/UL (ref 4–11)

## 2020-08-10 PROCEDURE — 99214 OFFICE O/P EST MOD 30 MIN: CPT | Performed by: INTERNAL MEDICINE

## 2020-08-10 NOTE — PROGRESS NOTES
8/10/2020    TELEHEALTH EVALUATION -- Audio/Visual (During CFZFP-53 public health emergency)    HPI:    Alba Apple (:  1955) has requested an audio/video evaluation for the following concern(s):  Polymyositis and generalized osteoarthritis. Since last seen, she is doing very well, functionally. She tells me that she no longer uses cane, is able to go up and down the stairs, work at her garden which she was unable to do last year. Overall, she seems to be pleased on current therapy however her muscle enzymes have not normalized, and had never been normal even when she was taking high-dose of prednisone. Continues to take CellCept, Plaquenil and 10 mg of prednisone daily. No overt flares. Tolerating medications well. Denies any diplopia, dysphagia, focal muscle weakness, GI upset, infections, cough or shortness of breath. All other review of systems are negative. Review of Systems    Prior to Visit Medications    Medication Sig Taking?  Authorizing Provider   mycophenolate (CELLCEPT) 500 MG tablet TAKE 2 TABLETS BY MOUTH TWICE A DAY  Meera Dunham MD   losartan (COZAAR) 100 MG tablet Take 1 tablet by mouth daily  Sylvia Rodney MD   predniSONE (DELTASONE) 5 MG tablet TAKE 1 TABLET BY MOUTH TWICE A DAY  Meera Dunham MD   hydroxychloroquine (PLAQUENIL) 200 MG tablet Take 1 tablet by mouth 2 times daily  Meera Dunham MD   furosemide (LASIX) 40 MG tablet Take 1 tablet by mouth daily In AM  Meera Dunham MD   Omega-3 Fatty Acids (FISH OIL) 1000 MG CAPS Take 3,000 mg by mouth 3 times daily  Historical Provider, MD   B COMPLEX-C PO Take by mouth  Historical Provider, MD   Multiple Minerals-Vitamins (CALCIUM-MAGNESIUM-ZINC-D3 PO) Take by mouth  Historical Provider, MD   Nutritional Supplements (VITAMIN D BOOSTER PO) Take by mouth  Historical Provider, MD   pantoprazole (PROTONIX) 20 MG tablet Take 1 tablet by mouth every morning (before breakfast)  Sylvia Rodney MD       Social identification was verified at the start of the visit: Yes    Total time spent on this encounter: Not billed by time    Services were provided through a video synchronous discussion virtually to substitute for in-person clinic visit. Patient and provider were located at their individual homes. --Carlene Lira MD on 8/10/2020 at 8:34 AM    An electronic signature was used to authenticate this note.

## 2020-09-01 RX ORDER — PREDNISONE 1 MG/1
TABLET ORAL
Qty: 180 TABLET | Refills: 0 | Status: SHIPPED | OUTPATIENT
Start: 2020-09-01 | End: 2021-08-12 | Stop reason: SDUPTHER

## 2020-09-02 RX ORDER — MYCOPHENOLATE MOFETIL 500 MG/1
TABLET ORAL
Qty: 360 TABLET | Refills: 0 | Status: SHIPPED | OUTPATIENT
Start: 2020-09-02 | End: 2020-12-18 | Stop reason: SDUPTHER

## 2020-11-09 ENCOUNTER — OFFICE VISIT (OUTPATIENT)
Dept: RHEUMATOLOGY | Age: 65
End: 2020-11-09
Payer: MEDICARE

## 2020-11-09 VITALS — BODY MASS INDEX: 38.09 KG/M2 | TEMPERATURE: 97.3 F | HEIGHT: 60 IN | WEIGHT: 194 LBS

## 2020-11-09 LAB
A/G RATIO: 2.5 (ref 1.1–2.2)
ALBUMIN SERPL-MCNC: 4.8 G/DL (ref 3.4–5)
ALP BLD-CCNC: 62 U/L (ref 40–129)
ALT SERPL-CCNC: 19 U/L (ref 10–40)
ANION GAP SERPL CALCULATED.3IONS-SCNC: 15 MMOL/L (ref 3–16)
AST SERPL-CCNC: 32 U/L (ref 15–37)
BASOPHILS ABSOLUTE: 0 K/UL (ref 0–0.2)
BASOPHILS RELATIVE PERCENT: 0.8 %
BILIRUB SERPL-MCNC: 1 MG/DL (ref 0–1)
BUN BLDV-MCNC: 13 MG/DL (ref 7–20)
C-REACTIVE PROTEIN: 1.3 MG/L (ref 0–5.1)
CALCIUM SERPL-MCNC: 9.6 MG/DL (ref 8.3–10.6)
CHLORIDE BLD-SCNC: 101 MMOL/L (ref 99–110)
CO2: 26 MMOL/L (ref 21–32)
CREAT SERPL-MCNC: 0.5 MG/DL (ref 0.6–1.2)
EOSINOPHILS ABSOLUTE: 0.1 K/UL (ref 0–0.6)
EOSINOPHILS RELATIVE PERCENT: 2.4 %
GFR AFRICAN AMERICAN: >60
GFR NON-AFRICAN AMERICAN: >60
GLOBULIN: 1.9 G/DL
GLUCOSE BLD-MCNC: 107 MG/DL (ref 70–99)
HCT VFR BLD CALC: 41.8 % (ref 36–48)
HEMOGLOBIN: 13.6 G/DL (ref 12–16)
LYMPHOCYTES ABSOLUTE: 1.7 K/UL (ref 1–5.1)
LYMPHOCYTES RELATIVE PERCENT: 27.4 %
MCH RBC QN AUTO: 28.8 PG (ref 26–34)
MCHC RBC AUTO-ENTMCNC: 32.6 G/DL (ref 31–36)
MCV RBC AUTO: 88.5 FL (ref 80–100)
MONOCYTES ABSOLUTE: 0.4 K/UL (ref 0–1.3)
MONOCYTES RELATIVE PERCENT: 7.2 %
NEUTROPHILS ABSOLUTE: 3.7 K/UL (ref 1.7–7.7)
NEUTROPHILS RELATIVE PERCENT: 62.2 %
PDW BLD-RTO: 14.1 % (ref 12.4–15.4)
PLATELET # BLD: 271 K/UL (ref 135–450)
PMV BLD AUTO: 8.9 FL (ref 5–10.5)
POTASSIUM SERPL-SCNC: 4.2 MMOL/L (ref 3.5–5.1)
RBC # BLD: 4.72 M/UL (ref 4–5.2)
SEDIMENTATION RATE, ERYTHROCYTE: 3 MM/HR (ref 0–30)
SODIUM BLD-SCNC: 142 MMOL/L (ref 136–145)
TOTAL CK: 1498 U/L (ref 26–192)
TOTAL PROTEIN: 6.7 G/DL (ref 6.4–8.2)
WBC # BLD: 6 K/UL (ref 4–11)

## 2020-11-09 PROCEDURE — 99214 OFFICE O/P EST MOD 30 MIN: CPT | Performed by: INTERNAL MEDICINE

## 2020-11-09 PROCEDURE — 36415 COLL VENOUS BLD VENIPUNCTURE: CPT | Performed by: INTERNAL MEDICINE

## 2020-11-09 NOTE — PROGRESS NOTES
[address] [place] [address], [address] [place] [address] (B) [phone] (F)      Dear Dr. Little Torrez MD:  Please find Rheumatology assessment. Thank you for giving me the opportunity to be involved in Bob Paul's care and I look forward following Bob Turk along with you. If you have any questions or concerns please feel free to reach me. Note is transcribed using voice recognition software. Inadvertent computerized transcription errors may be present. Patient identification: Conrad Rubio: [id number],42 y.o. Sex: female     A/P    Bob Turk was seen today for follow-up. 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 Able to go up and down the stairs, squat, get up from sitting position without any assistance. CK is still elevated. Normal inflammatory markers. Is on CellCept 1 g twice daily and prednisone 5 mg 3 times a week. No other musculoskeletal or cardiorespiratory symptoms. Tolerating medications well. PFTs showed COPD changes. Plan-  I showed trend of CK to her, it is still elevate at 2500 range. Her baseline CK at the time of diagnosis was about 5000. Functionally, she has shown dramatic improvement in her symptoms. I will recheck CK at this time and see where we stand, do not expect much change as immunosuppressive therapy has not been changed.   The question is- shall we optimize immunosuppressive therapy by adding more immunosuppressive medications, which would be recommended however patient and I  do not feel comfortable adding more immunosuppressive medications given the current pandemic and  she is doing so well functionally. Therefore, we will watch her closely and optimize treatment once pandemic gets under better control. Continue physical reconditioning. She is aware of myositis emergencies. She is due for colonoscopy, rest of the age-specific screening are normal.    DEXA scan 12/23/2019 L-spine-  Normal, left hip -0.4, femoral neck -1.4. Continue calcium and vitamin D supplementation. Patient had long list of questions all were answered in my best capacity. Patient indicates understanding and agrees with the management plan. I reviewed patient's history, referral documents and electronic medical records. Time spent 45 minutes, >25 minutes was spent explaining diagnosis, monitoring, medications, prognosis, course of the disease. #######################################################################    Nyrhphakde-jampac-mx for inflammatory myositis-polymyositis. Interval changes-  She is doing so much better functionally, tells me that she is nearly back to baseline. Able to do daily chores, recreational activities including walking more than a mile a day, some hiking, and daily chores around the house without any discomfort. She is not needing cane for ambulation, able to get up from the chair unassisted. Tells me that she has not felt this good in number of years. She has tapered her prednisone 3 times a week 5 mg without any change in her symptoms, continues to take CellCept and hydroxychloroquine. She denies any dysphagia, diplopia, focal muscle weakness, swollen or inflamed joints, GI side effects, visual side effects or any rashes. All other review of systems are negative.       Past Medical History:   Diagnosis Date    Benign essential HTN 3/7/2019  GERD (gastroesophageal reflux disease)      Past Surgical History:   Procedure Laterality Date    BREAST BIOPSY  1990    benign    MUSCLE BIOPSY Left 10/8/2019    LEFT DELTOID MUSCLE BIOPSY, LEFT QUADRICEP MUSCLE BIOPSY performed by David Yousif DO at [facility] History     Socioeconomic History    Marital status:      Spouse name: Not on file    Number of children: 2    Years of education: Not on file    Highest education level: Not on file   Occupational History    Occupation: housewife   Social Needs    Financial resource strain: Not on file    Food insecurity     Worry: Not on file     Inability: Not on file   [employer] needs     Medical: Not on file     Non-medical: Not on file   Tobacco Use    Smoking status: Former Smoker     Packs/day: 2.00     Years: 15.00     Pack years: 30.00     Types: Cigarettes     Last attempt to quit: 3/20/2005     Years since quitting: 15.6    Smokeless tobacco: Never Used   Substance and Sexual Activity    Alcohol use: Yes     Frequency: 4 or more times a week     Drinks per session: 1 or 2     Binge frequency: Never     Comment: 2 per night    Drug use: Never    Sexual activity: Yes     Partners: Male   Lifestyle    Physical activity     Days per week: Not on file     Minutes per session: Not on file    Stress: Not on file   Relationships    Social connections     Talks on phone:  Three times a week     Gets together: Twice a week     Attends Scientologist service: More than 4 times per year     Active member of club or organization: No     Attends meetings of clubs or organizations: Never     Relationship status:     Intimate partner violence     Fear of current or ex partner: No     Emotionally abused: No     Physically abused: No     Forced sexual activity: No   Other Topics Concern    Not on file   Social History Narrative    Happily --has special needs kid    Hobbies--reads--puzzles       No family history of autoimmune diseases    Current Outpatient Medications   Medication Sig Dispense Refill    mycophenolate (CELLCEPT) 500 MG tablet TAKE 2 TABLETS BY MOUTH TWICE A  tablet 0    predniSONE (DELTASONE) 5 MG tablet TAKE 1 TABLET BY MOUTH TWICE A  tablet 0    losartan (COZAAR) 100 MG tablet Take 1 tablet by mouth daily 90 tablet 3    hydroxychloroquine (PLAQUENIL) 200 MG tablet Take 1 tablet by mouth 2 times daily 180 tablet 2    furosemide (LASIX) 40 MG tablet Take 1 tablet by mouth daily In AM 14 tablet 0    Omega-3 Fatty Acids (FISH OIL) 1000 MG CAPS Take 3,000 mg by mouth 3 times daily      B COMPLEX-C PO Take by mouth      Multiple Minerals-Vitamins (CALCIUM-MAGNESIUM-ZINC-D3 PO) Take by mouth      Nutritional Supplements (VITAMIN D BOOSTER PO) Take by mouth      pantoprazole (PROTONIX) 20 MG tablet Take 1 tablet by mouth every morning (before breakfast) 30 tablet 5     No current facility-administered medications for this visit. No Known Allergies    PHYSICAL EXAM:    Vitals:    Temp 97.3 °F (36.3 °C) (Skin)   Ht 5' (1.524 m)   Wt 194 lb (88 kg)   BMI 37.89 kg/m²   General appearance/ Psychiatric: well nourished, and well groomed, normal judgement, alert, appears stated age and cooperative. MKS: She does not have any swollen or inflamed joints in upper or lower extremities. Muscle strength is normal in her upper extremity muscles proximally and distally, even in her lower extremities in the quad, hamstrings, adductors and abductors-nearly normal muscle strength. Able to get up from chair unassisted. She is having difficulty in getting up from floor. She walks with normal gait. Muscle strength is normal.  Neck flexors are normal.   extraocular muscles are normal.  DTRs normal biceps as well as knee. Spine - normal exam.  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 Mild periungual changes. No rashes, no induration or skin thickening or nodules.  No

## 2020-11-11 LAB — ALDOLASE: 9.5 U/L (ref 1.5–8.1)

## 2020-12-17 ENCOUNTER — PATIENT MESSAGE (OUTPATIENT)
Dept: RHEUMATOLOGY | Age: 65
End: 2020-12-17

## 2020-12-17 RX ORDER — HYDROXYCHLOROQUINE SULFATE 200 MG/1
200 TABLET, FILM COATED ORAL 2 TIMES DAILY
Qty: 180 TABLET | Refills: 2 | Status: SHIPPED | OUTPATIENT
Start: 2020-12-17 | End: 2021-09-02

## 2020-12-17 NOTE — TELEPHONE ENCOUNTER
From: Felicia Crane  To: Taylor Cheney MD  Sent: 12/17/2020 1:37 PM EST  Subject: Prescription Question    just got notice that [facility] is having trouble filling my prescription for hydroxychloroquine. [facility] [phone]. They said they have been trying to contact office. Have maybe one week of pills left. Thanks and have a great and safe Holiday.

## 2020-12-18 RX ORDER — MYCOPHENOLATE MOFETIL 500 MG/1
1000 TABLET ORAL 2 TIMES DAILY
Qty: 360 TABLET | Refills: 0 | Status: SHIPPED | OUTPATIENT
Start: 2020-12-18 | End: 2021-03-12

## 2021-02-08 ENCOUNTER — OFFICE VISIT (OUTPATIENT)
Dept: RHEUMATOLOGY | Age: 66
End: 2021-02-08
Payer: MEDICARE

## 2021-02-08 VITALS — TEMPERATURE: 98.2 F | HEIGHT: 60 IN | BODY MASS INDEX: 38.09 KG/M2 | WEIGHT: 194 LBS

## 2021-02-08 DIAGNOSIS — G72.49 INFLAMMATORY MYOPATHY: Primary | ICD-10-CM

## 2021-02-08 DIAGNOSIS — Z79.899 HIGH RISK MEDICATION USE: ICD-10-CM

## 2021-02-08 DIAGNOSIS — M15.9 GENERALIZED OSTEOARTHRITIS: ICD-10-CM

## 2021-02-08 LAB
A/G RATIO: 1.9 (ref 1.1–2.2)
ALBUMIN SERPL-MCNC: 4.6 G/DL (ref 3.4–5)
ALP BLD-CCNC: 69 U/L (ref 40–129)
ALT SERPL-CCNC: 15 U/L (ref 10–40)
ANION GAP SERPL CALCULATED.3IONS-SCNC: 14 MMOL/L (ref 3–16)
AST SERPL-CCNC: 28 U/L (ref 15–37)
BACTERIA: ABNORMAL /HPF
BILIRUB SERPL-MCNC: 1 MG/DL (ref 0–1)
BILIRUBIN URINE: NEGATIVE
BLOOD, URINE: NEGATIVE
BUN BLDV-MCNC: 18 MG/DL (ref 7–20)
C-REACTIVE PROTEIN: <3 MG/L (ref 0–5.1)
CALCIUM SERPL-MCNC: 9.7 MG/DL (ref 8.3–10.6)
CHLORIDE BLD-SCNC: 99 MMOL/L (ref 99–110)
CLARITY: CLEAR
CO2: 23 MMOL/L (ref 21–32)
COLOR: YELLOW
CREAT SERPL-MCNC: <0.5 MG/DL (ref 0.6–1.2)
EPITHELIAL CELLS, UA: 5 /HPF (ref 0–5)
GFR AFRICAN AMERICAN: >60
GFR NON-AFRICAN AMERICAN: >60
GLOBULIN: 2.4 G/DL
GLUCOSE BLD-MCNC: 87 MG/DL (ref 70–99)
GLUCOSE URINE: NEGATIVE MG/DL
HYALINE CASTS: 0 /LPF (ref 0–8)
KETONES, URINE: NEGATIVE MG/DL
LEUKOCYTE ESTERASE, URINE: ABNORMAL
MICROSCOPIC EXAMINATION: YES
NITRITE, URINE: NEGATIVE
PH UA: 7 (ref 5–8)
POTASSIUM SERPL-SCNC: 4.5 MMOL/L (ref 3.5–5.1)
PROTEIN UA: NEGATIVE MG/DL
RBC UA: 1 /HPF (ref 0–4)
SEDIMENTATION RATE, ERYTHROCYTE: 9 MM/HR (ref 0–30)
SODIUM BLD-SCNC: 136 MMOL/L (ref 136–145)
SPECIFIC GRAVITY UA: <1.005 (ref 1–1.03)
TOTAL CK: 1059 U/L (ref 26–192)
TOTAL PROTEIN: 7 G/DL (ref 6.4–8.2)
URINE TYPE: ABNORMAL
UROBILINOGEN, URINE: 0.2 E.U./DL
WBC UA: 4 /HPF (ref 0–5)

## 2021-02-08 PROCEDURE — 36415 COLL VENOUS BLD VENIPUNCTURE: CPT | Performed by: INTERNAL MEDICINE

## 2021-02-08 PROCEDURE — 81003 URINALYSIS AUTO W/O SCOPE: CPT | Performed by: INTERNAL MEDICINE

## 2021-02-08 PROCEDURE — 99214 OFFICE O/P EST MOD 30 MIN: CPT | Performed by: INTERNAL MEDICINE

## 2021-02-08 PROCEDURE — 85025 COMPLETE CBC W/AUTO DIFF WBC: CPT | Performed by: INTERNAL MEDICINE

## 2021-02-08 NOTE — PROGRESS NOTES
documents and electronic medical records. #######################################################################    Ojkyqxatyx-gymcwu-bq for inflammatory myositis-polymyositis. Interval Katie Session is pleased with the progress she made over time in terms of muscle strength, is able to get up from sitting position without any assistance, do normal ADLs, daily chores as well as babysit her grandson. No new muscle weakness. No diplopia, dysphagia. Has been experiencing worsening discomfort in her knees from osteoarthritis, lately unable to do stationary bike exercises. No swelling. Tolerating meds well. All other review of systems are negative. PMH, PSH- reviewed. No family history of autoimmune diseases    Current Outpatient Medications   Medication Sig Dispense Refill    mycophenolate (CELLCEPT) 500 MG tablet Take 2 tablets by mouth 2 times daily 360 tablet 0    hydroxychloroquine (PLAQUENIL) 200 MG tablet Take 1 tablet by mouth 2 times daily 180 tablet 2    predniSONE (DELTASONE) 5 MG tablet TAKE 1 TABLET BY MOUTH TWICE A DAY (Patient taking differently: TAKE 1 TABLET every other day) 180 tablet 0    losartan (COZAAR) 100 MG tablet Take 1 tablet by mouth daily 90 tablet 3    furosemide (LASIX) 40 MG tablet Take 1 tablet by mouth daily In AM 14 tablet 0    Omega-3 Fatty Acids (FISH OIL) 1000 MG CAPS Take 3,000 mg by mouth 3 times daily      B COMPLEX-C PO Take by mouth      Multiple Minerals-Vitamins (CALCIUM-MAGNESIUM-ZINC-D3 PO) Take by mouth      Nutritional Supplements (VITAMIN D BOOSTER PO) Take by mouth      pantoprazole (PROTONIX) 20 MG tablet Take 1 tablet by mouth every morning (before breakfast) 30 tablet 5     No current facility-administered medications for this visit.       No Known Allergies    PHYSICAL EXAM:    Vitals:    Temp 98.2 °F (36.8 °C) (Skin)   Ht 5' (1.524 m)   Wt 194 lb (88 kg)   BMI 37.89 kg/m²   General appearance/ Psychiatric: well nourished, and well groomed, normal judgement, alert, appears stated age and cooperative. MKS: No swollen or inflamed joints in upper or lower extremities. Muscle strength is normal in upper and lower extremities proximally and distally. She is able to stand from sitting position without any assistance. She is having difficulty in getting up from floor. This could perhaps from knee osteoarthritis as well. She walks with normal gait. Neck flexors are normal.   extraocular muscles are normal.  DTRs normal biceps as well as knee. Spine - normal exam.  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 Active raynaud. No tissue loss. No rashes, no induration or skin thickening or nodules. No evidence ischemia or deformities noted in digits or nails. Chest - No added sounds, equal air entry        DATA:   Lab Results   Component Value Date    WBC 6.0 11/09/2020    HGB 13.6 11/09/2020    HCT 41.8 11/09/2020    MCV 88.5 11/09/2020     11/09/2020         Chemistry        Component Value Date/Time     11/09/2020 0939    K 4.2 11/09/2020 0939     11/09/2020 0939    CO2 26 11/09/2020 0939    BUN 13 11/09/2020 0939    CREATININE 0.5 (L) 11/09/2020 0939        Component Value Date/Time    CALCIUM 9.6 11/09/2020 0939    ALKPHOS 62 11/09/2020 0939    AST 32 11/09/2020 0939    ALT 19 11/09/2020 0939    BILITOT 1.0 11/09/2020 0939          No results found for: [facility]  Lab Results   Component Value Date    SEDRATE 3 11/09/2020     Lab Results   Component Value Date    CRP 1.3 11/09/2020     Lab Results   Component Value Date    JOSE Negative 03/02/2020    SEDRATE 3 11/09/2020     Lab Results   Component Value Date    CKTOTAL 1,498 (H) 11/09/2020     Lab Results   Component Value Date    TSH 4.93 (H) 08/21/2019     Lab Results   Component Value Date    VITD25 24.6 (L) 04/29/2019         Radiology Review:        A/P- See above.

## 2021-02-10 LAB — ALDOLASE: 9.6 U/L (ref 1.5–8.1)

## 2021-03-12 RX ORDER — MYCOPHENOLATE MOFETIL 500 MG/1
TABLET ORAL
Qty: 360 TABLET | Refills: 0 | Status: SHIPPED | OUTPATIENT
Start: 2021-03-12 | End: 2021-06-04

## 2021-05-10 ENCOUNTER — OFFICE VISIT (OUTPATIENT)
Dept: RHEUMATOLOGY | Age: 66
End: 2021-05-10
Payer: MEDICARE

## 2021-05-10 VITALS
SYSTOLIC BLOOD PRESSURE: 134 MMHG | WEIGHT: 222 LBS | HEIGHT: 60 IN | DIASTOLIC BLOOD PRESSURE: 76 MMHG | BODY MASS INDEX: 43.59 KG/M2

## 2021-05-10 DIAGNOSIS — Z79.899 HIGH RISK MEDICATION USE: ICD-10-CM

## 2021-05-10 DIAGNOSIS — G72.49 INFLAMMATORY MYOPATHY: Primary | ICD-10-CM

## 2021-05-10 DIAGNOSIS — M15.9 GENERALIZED OSTEOARTHRITIS: ICD-10-CM

## 2021-05-10 LAB
A/G RATIO: 2.5 (ref 1.1–2.2)
ALBUMIN SERPL-MCNC: 4.7 G/DL (ref 3.4–5)
ALP BLD-CCNC: 68 U/L (ref 40–129)
ALT SERPL-CCNC: 10 U/L (ref 10–40)
ANION GAP SERPL CALCULATED.3IONS-SCNC: 17 MMOL/L (ref 3–16)
AST SERPL-CCNC: 20 U/L (ref 15–37)
BASOPHILS ABSOLUTE: 0.1 K/UL (ref 0–0.2)
BASOPHILS RELATIVE PERCENT: 0.6 %
BILIRUB SERPL-MCNC: 0.7 MG/DL (ref 0–1)
BUN BLDV-MCNC: 21 MG/DL (ref 7–20)
C-REACTIVE PROTEIN: <3 MG/L (ref 0–5.1)
CALCIUM SERPL-MCNC: 9 MG/DL (ref 8.3–10.6)
CHLORIDE BLD-SCNC: 103 MMOL/L (ref 99–110)
CO2: 21 MMOL/L (ref 21–32)
CREAT SERPL-MCNC: 0.6 MG/DL (ref 0.6–1.2)
EOSINOPHILS ABSOLUTE: 0.1 K/UL (ref 0–0.6)
EOSINOPHILS RELATIVE PERCENT: 1.7 %
GFR AFRICAN AMERICAN: >60
GFR NON-AFRICAN AMERICAN: >60
GLOBULIN: 1.9 G/DL
GLUCOSE BLD-MCNC: 104 MG/DL (ref 70–99)
HCT VFR BLD CALC: 40.2 % (ref 36–48)
HEMOGLOBIN: 13.3 G/DL (ref 12–16)
LYMPHOCYTES ABSOLUTE: 2.1 K/UL (ref 1–5.1)
LYMPHOCYTES RELATIVE PERCENT: 26.9 %
MCH RBC QN AUTO: 28.8 PG (ref 26–34)
MCHC RBC AUTO-ENTMCNC: 33 G/DL (ref 31–36)
MCV RBC AUTO: 87.3 FL (ref 80–100)
MONOCYTES ABSOLUTE: 0.5 K/UL (ref 0–1.3)
MONOCYTES RELATIVE PERCENT: 6.4 %
NEUTROPHILS ABSOLUTE: 5.1 K/UL (ref 1.7–7.7)
NEUTROPHILS RELATIVE PERCENT: 64.4 %
PDW BLD-RTO: 14 % (ref 12.4–15.4)
PLATELET # BLD: 253 K/UL (ref 135–450)
PMV BLD AUTO: 9.1 FL (ref 5–10.5)
POTASSIUM SERPL-SCNC: 4.2 MMOL/L (ref 3.5–5.1)
RBC # BLD: 4.6 M/UL (ref 4–5.2)
SEDIMENTATION RATE, ERYTHROCYTE: 4 MM/HR (ref 0–30)
SODIUM BLD-SCNC: 141 MMOL/L (ref 136–145)
TOTAL CK: 649 U/L (ref 26–192)
TOTAL PROTEIN: 6.6 G/DL (ref 6.4–8.2)
WBC # BLD: 7.9 K/UL (ref 4–11)

## 2021-05-10 PROCEDURE — 36415 COLL VENOUS BLD VENIPUNCTURE: CPT | Performed by: INTERNAL MEDICINE

## 2021-05-10 PROCEDURE — 99214 OFFICE O/P EST MOD 30 MIN: CPT | Performed by: INTERNAL MEDICINE

## 2021-05-10 NOTE — PROGRESS NOTES
[address] [place] [address]. (c) [phone] (F)      Dear Dr. Antwan Oswald MD:  Please find Rheumatology assessment. Thank you for giving me the opportunity to be involved in Abhilash Paul's care and I look forward following Abhilash Mota along with you. If you have any questions or concerns please feel free to reach me. Note is transcribed using voice recognition software. Inadvertent computerized transcription errors may be present. Patient identification: Akosua Cannon: [id number],89 y.o. Sex: female     A/P    Abhilash Mota was seen today for follow-up. Diagnoses and all orders for this visit:    Inflammatory myopathy    High risk medication use  -     Comprehensive Metabolic Panel; Future  -     CBC Auto Differential; Future  -     C-Reactive Protein; Future  -     Sedimentation Rate; Future  -     CK; Future  -     Aldolase; Future  -     Aldolase  -     CK  -     Sedimentation Rate  -     C-Reactive Protein  -     CBC Auto Differential  -     Comprehensive Metabolic Panel    Generalized osteoarthritis      Inflammatory polymyositis-remains stable, still has elevated CK but functionally feels much better. On prednisone 5 mg every other day hydroxychloroquine 200 mg twice daily and CellCept 1 g twice daily. Eye exam 3/20/2021. Tolerating meds well. We will check safety labs today. We will also repeat PFTs. Continue current treatment. Generalized osteoarthritis-worsening symptoms in her outer hip and lower back-from osteoarthritis. Continue exercises as tolerated and Tylenol arthritis. Monitoring for myositis-PFTs 9/20/2019- COPD changes.     DEXA scan 12/23/2019 for this visit. No Known Allergies    PHYSICAL EXAM:    Vitals:    /76   Ht 5' (1.524 m)   Wt 222 lb (100.7 kg)   BMI 43.36 kg/m²   General appearance/ Psychiatric: well nourished, and well groomed, normal judgement, alert, appears stated age and cooperative. MKS: She does not have any tender, swollen or inflamed joints in upper or lower extremities. Muscle strength is normal in upper and lower extremities proximally and distally. She is able to stand from sitting position without any assistance. Not able to get up from floor without assistance. Walks normally with normal gait. Neck flexors are normal.   extraocular muscles are normal.  Mild tenderness present at the right trochanteric bursa area. Spine - normal exam.  Skin: No rashes. no tissue loss. No rashes, no induration or skin thickening or nodules. No evidence ischemia or deformities noted in digits or nails.   Chest - No added sounds, equal air entry        DATA:   Lab Results   Component Value Date    WBC 6.0 11/09/2020    HGB 13.6 11/09/2020    HCT 41.8 11/09/2020    MCV 88.5 11/09/2020     11/09/2020         Chemistry        Component Value Date/Time     02/08/2021 1025    K 4.5 02/08/2021 1025    CL 99 02/08/2021 1025    CO2 23 02/08/2021 1025    BUN 18 02/08/2021 1025    CREATININE <0.5 (L) 02/08/2021 1025        Component Value Date/Time    CALCIUM 9.7 02/08/2021 1025    ALKPHOS 69 02/08/2021 1025    AST 28 02/08/2021 1025    ALT 15 02/08/2021 1025    BILITOT 1.0 02/08/2021 1025          No results found for: [facility]  Lab Results   Component Value Date    SEDRATE 9 02/08/2021     Lab Results   Component Value Date    CRP <3.0 02/08/2021     Lab Results   Component Value Date    JOSE Negative 03/02/2020    SEDRATE 9 02/08/2021     Lab Results   Component Value Date    CKTOTAL 1,059 (H) 02/08/2021     Lab Results   Component Value Date    TSH 4.93 (H) 08/21/2019     Lab Results   Component Value Date    VITD25 24.6 (L) 04/29/2019         Radiology Review:        A/P- See above.

## 2021-05-12 LAB — ALDOLASE: 5.5 U/L (ref 1.5–8.1)

## 2021-06-04 RX ORDER — MYCOPHENOLATE MOFETIL 500 MG/1
TABLET ORAL
Qty: 360 TABLET | Refills: 0 | Status: SHIPPED | OUTPATIENT
Start: 2021-06-04 | End: 2021-06-20 | Stop reason: SDUPTHER

## 2021-06-21 RX ORDER — MYCOPHENOLATE MOFETIL 500 MG/1
TABLET ORAL
Qty: 360 TABLET | Refills: 0 | Status: SHIPPED | OUTPATIENT
Start: 2021-06-21 | End: 2021-12-05 | Stop reason: SDUPTHER

## 2021-07-19 RX ORDER — LOSARTAN POTASSIUM 100 MG/1
TABLET ORAL
Qty: 90 TABLET | Refills: 3 | Status: SHIPPED | OUTPATIENT
Start: 2021-07-19 | End: 2022-07-04

## 2021-08-02 ENCOUNTER — HOSPITAL ENCOUNTER (OUTPATIENT)
Dept: PULMONOLOGY | Age: 66
Discharge: HOME | End: 2021-08-02
Payer: MEDICARE

## 2021-08-02 DIAGNOSIS — G72.49 INFLAMMATORY MYOPATHY: ICD-10-CM

## 2021-08-02 PROCEDURE — 6360000002 HC RX W HCPCS: Performed by: INTERNAL MEDICINE

## 2021-08-02 PROCEDURE — 94618 PULMONARY STRESS TESTING: CPT

## 2021-08-02 PROCEDURE — 94729 DIFFUSING CAPACITY: CPT

## 2021-08-02 PROCEDURE — 94664 DEMO&/EVAL PT USE INHALER: CPT

## 2021-08-02 PROCEDURE — 94060 EVALUATION OF WHEEZING: CPT

## 2021-08-02 PROCEDURE — 94726 PLETHYSMOGRAPHY LUNG VOLUMES: CPT

## 2021-08-02 PROCEDURE — 94760 N-INVAS EAR/PLS OXIMETRY 1: CPT

## 2021-08-02 RX ORDER — ALBUTEROL SULFATE 2.5 MG/3ML
2.5 SOLUTION RESPIRATORY (INHALATION) ONCE
Status: COMPLETED | OUTPATIENT
Start: 2021-08-02 | End: 2021-08-02

## 2021-08-02 RX ADMIN — ALBUTEROL SULFATE 2.5 MG: 2.5 SOLUTION RESPIRATORY (INHALATION) at 11:05

## 2021-08-02 NOTE — PROGRESS NOTES
Six Minute Walk     []  Desaturation Screening []  Endurance Test       Name:  Zeke Record Number:  [id number]  Age: 77 y.o. Gender: female  : 1955  Today's Date:  2021  Pulmonary History:Inflammatory Myopathy, Raynaud's Disease  Home Oxygen Therapy:  room air  Home Respiratory Therapy:None                    6 MINUTE WALK DATA    Modality Time (Minutes) SPO2 RR  Heart Rate O2 SOB Pain Level   Rest 2 97 14  102 R/A 0 0   Walk 1 97   104 R/A 0 0   Walk 2 94   109 R/A 0 0   Walk 3 91   110 R/A 0 0   Walk 4 89   121 R/A 0 0   Walk 5 89   125 R/A 0 0   Walk 6 92   122 R/A 0 0   Recovery 2 95 16  110 R/A 0 0     Karmen SOB Scale:  0=Nothing at all; 0.5=Very, very slight; 1=Very slight; 2=Slight; 3=Moderate; 4=Somewhat severe; 5=Severe; 7=Very Severe; 10=Very, very Severe    Exercise Summary:  Distance Walked (feet): 960  Lowest SpO2 During Walk:  89  (FIO2)  R/A  Walking Pace (Steps per Minute)  80  Stopped or Paused during Walk:  yes    Comments / Reason:  Pt hands were cold during walk. Pt has Hx of Raynaud's, Pt did walk without stopping or pausing. She stated she was not short of breath or in pain during walk.      Electronically signed by Betsy Benavides RCP on 2021 at 11:13 AM

## 2021-08-02 NOTE — PROGRESS NOTES
Six Minute Walk     []  Desaturation Screening []  Endurance Test       Name:  Zeke Record Number:  [id number]  Age: 77 y.o. Gender: female  : 1955  Today's Date:  2021  Pulmonary History:Inflammatory Myopathy, Raynaud's Disease  Home Oxygen Therapy:  room air  Home Respiratory Therapy:None                    6 MINUTE WALK DATA    Modality Time (Minutes) SPO2 RR  Heart Rate O2 SOB Pain Level   Rest 2 97 14  102 R/A 0 0   Walk 1 97   104 R/A 0 0   Walk 2 94   109 R/A 0 0   Walk 3 91   110 R/A 0 0   Walk 4 89   121 R/A 0 0   Walk 5 89   125 R/A 0 0   Walk 6 92   122 R/A 0 0   Recovery 2 95 16  110 R/A 0 0     Karmen SOB Scale:  0=Nothing at all; 0.5=Very, very slight; 1=Very slight; 2=Slight; 3=Moderate; 4=Somewhat severe; 5=Severe; 7=Very Severe; 10=Very, very Severe    Exercise Summary:  Distance Walked (feet): 960  Lowest SpO2 During Walk:  89  (FIO2)  R/A  Walking Pace (Steps per Minute)  80  Stopped or Paused during Walk:  yes    Comments / Reason:  Pt hands were cold during walk. Pt has Hx of Raynaud's, Pt did walk without stopping or pausing. She stated she was not short of breath or in pain during walk.      Electronically signed by Meka Perez RCP on 2021 at 11:46 AM

## 2021-08-10 NOTE — PROCEDURES
[address]               [address] [place], [address]                               PULMONARY FUNCTION    PATIENT NAME: Sherrie Brady               :        1955  MED REC NO:   [id number]                          ROOM:  ACCOUNT NO:   [de-identified]                           ADMIT DATE: 2021  PROVIDER:     Jerardo Lunog MD    DATE OF PROCEDURE:  2021    Forced vital capacity moderately reduced, FEV1 mildly reduced, FEV1 to  FVC ratio normal.  After inhaled bronchodilator, vital capacity  increased, while expiratory flow rates are unchanged. Total lung  capacity and FRC normal, RV increased, by plethysmography determination. Single-breath diffusion capacity normal.    IMPRESSION:  Mild restrictive ventilatory defect exhibited on  spirometry. Findings are not confirmed on lung volume testing. Finding  is consistent with a neuromuscular, chest wall, pleural, or parenchymal  disorder, including obesity.         Dann Jaimes MD    D: 2021 18:04:40       T: 2021 18:07:16     [email or web address]  Job#: [id number]     Doc#: [id number]    CC:

## 2021-08-10 NOTE — PROCEDURES
[address]               [address] [place], [address]                               PULMONARY FUNCTION    PATIENT NAME: César López               :        1955  MED REC NO:   [id number]                          ROOM:  ACCOUNT NO:   [de-identified]                           ADMIT DATE: 2021  PROVIDER:     Gabrielle Santiago MD    DATE OF PROCEDURE:  2021    Six-minute walk study    Baseline oxyhemoglobin saturation 97% at rest.  The patient completed a  walk distance of 320 yards, on room air. Oxyhemoglobin saturation noted  to decrease to 89% at the lowest, although had cold fingers and the  accuracy of the reading is uncertain. Oxyhemoglobin saturation 95%  during recovery. IMPRESSION:  Normal exercise capacity on 6-minute walk study. No  complaint of impairment. Decrease in oxyhemoglobin saturation during  walk may be artifactual and is not considered severe.         Terrance Farris MD    D: 2021 18:08:03       T: 2021 18:11:38     [email or web address]  Job#: [id number]     Doc#: [id number]

## 2021-08-12 RX ORDER — PREDNISONE 1 MG/1
5 TABLET ORAL EVERY OTHER DAY
Qty: 15 TABLET | Refills: 0 | Status: SHIPPED | OUTPATIENT
Start: 2021-08-12 | End: 2021-09-07

## 2021-08-16 ENCOUNTER — OFFICE VISIT (OUTPATIENT)
Dept: RHEUMATOLOGY | Age: 66
End: 2021-08-16
Payer: MEDICARE

## 2021-08-16 VITALS
BODY MASS INDEX: 43.59 KG/M2 | DIASTOLIC BLOOD PRESSURE: 76 MMHG | SYSTOLIC BLOOD PRESSURE: 120 MMHG | WEIGHT: 222 LBS | HEIGHT: 60 IN

## 2021-08-16 DIAGNOSIS — G72.49 INFLAMMATORY MYOPATHY: Primary | ICD-10-CM

## 2021-08-16 DIAGNOSIS — Z79.899 HIGH RISK MEDICATION USE: ICD-10-CM

## 2021-08-16 PROCEDURE — 99214 OFFICE O/P EST MOD 30 MIN: CPT | Performed by: INTERNAL MEDICINE

## 2021-08-16 RX ORDER — PREDNISONE 1 MG/1
TABLET ORAL
Qty: 45 TABLET | Refills: 0 | Status: SHIPPED | OUTPATIENT
Start: 2021-08-16 | End: 2022-02-15

## 2021-08-16 NOTE — PROGRESS NOTES
[address] [place] [address] 56. (u) [phone] (F)      Dear Dr. Meena Eddy MD:  Please find Rheumatology assessment. Thank you for giving me the opportunity to be involved in Jesu Paul's care and I look forward following Jesu along with you. If you have any questions or concerns please feel free to reach me. Note is transcribed using voice recognition software. Inadvertent computerized transcription errors may be present. Patient identification: Bhavani Palmer: [id number],58 y.o. Sex: female     A/P    Jesu was seen today for follow-up. Diagnoses and all orders for this visit:    Inflammatory myopathy    High risk medication use  -     Comprehensive Metabolic Panel; Future  -     CBC Auto Differential; Future  -     C-Reactive Protein; Future  -     Sedimentation Rate; Future  -     CK; Future  -     Aldolase; Future  -     Covid-19, Antibody; Future    Other orders  -     predniSONE (DELTASONE) 5 MG tablet; Take 1 tab po every other day. Inflammatory polymyositis-subjective, objective and laboratory improvement. Muscle strength is back to baseline per patient. Is on prednisone 5 mg every other day hydroxychloroquine 200 mg twice daily and CellCept 1 g twice daily. Eye exam 3/20/2021. Tolerating meds well. PFTs-mild restrictive defect- ? Abdominal obesity. Check safety labs and disease activity markers as above. Prescription renewed. Continue current medications. Recommend COVID-19 booster dose. Generalized osteoarthritis-stable. Monitoring for myositis-PFTs 9/20/2019- COPD changes.     DEXA scan 12/23/2019 L-spine-    Normal, left hip -0.4, femoral neck -1.4. Continue calcium and vitamin D supplementation. Patient indicates understanding and agrees with the management plan. I reviewed patient's history, referral documents and electronic medical records. #######################################################################    Fcdypvyuyc-mpnqdb-br for inflammatory myositis-polymyositis. Interval Haynes Cushing is doing well, has no complaints or concerns today. States that her muscle strength is nearly back to baseline. Denies any flares. Normal ADLs and recreational activities. Tolerating medications well. Denies any diplopia, dysphagia, cough or shortness of breath or any focal weakness. All other review of systems are negative. PMH, PSH- reviewed. No family history of autoimmune diseases    Current Outpatient Medications   Medication Sig Dispense Refill    predniSONE (DELTASONE) 5 MG tablet Take 1 tab po every other day. 45 tablet 0    predniSONE (DELTASONE) 5 MG tablet Take 1 tablet by mouth every other day 15 tablet 0    losartan (COZAAR) 100 MG tablet TAKE 1 TABLET BY MOUTH EVERY DAY 90 tablet 3    mycophenolate (CELLCEPT) 500 MG tablet TAKE 2 TABLETS BY MOUTH TWICE A  tablet 0    hydroxychloroquine (PLAQUENIL) 200 MG tablet Take 1 tablet by mouth 2 times daily 180 tablet 2    furosemide (LASIX) 40 MG tablet Take 1 tablet by mouth daily In AM 14 tablet 0    Omega-3 Fatty Acids (FISH OIL) 1000 MG CAPS Take 3,000 mg by mouth 3 times daily      B COMPLEX-C PO Take by mouth      Multiple Minerals-Vitamins (CALCIUM-MAGNESIUM-ZINC-D3 PO) Take by mouth      Nutritional Supplements (VITAMIN D BOOSTER PO) Take by mouth      pantoprazole (PROTONIX) 20 MG tablet Take 1 tablet by mouth every morning (before breakfast) 30 tablet 5     No current facility-administered medications for this visit.      No Known Allergies    PHYSICAL EXAM:    Vitals:    /76   Ht 5' (1.524 m)   Wt 222 lb (100.7 kg)   BMI 43.36 kg/m²   General appearance/ Psychiatric: well nourished, and well groomed, normal judgement, alert, appears stated age and cooperative. MKS: She does not have any tender swollen or inflamed joints in upper or lower extremities. Muscle strength preserved in upper and lower extremities proximally and distally. She is able to stand from sitting position without any assistance. Not able to get up from floor without assistance. Walks normally with normal gait. Neck flexors are normal.   extraocular muscles are normal.  Mild tenderness present at the right trochanteric bursa area. Spine - normal exam.  Skin: No rashes. no tissue loss. No rashes, no induration or skin thickening or nodules. No evidence ischemia or deformities noted in digits or nails. Chest - No added sounds, equal air entry. All of the other findings are unchanged since last visit.        DATA:   Lab Results   Component Value Date    WBC 7.9 05/10/2021    HGB 13.3 05/10/2021    HCT 40.2 05/10/2021    MCV 87.3 05/10/2021     05/10/2021         Chemistry        Component Value Date/Time     05/10/2021 1009    K 4.2 05/10/2021 1009     05/10/2021 1009    CO2 21 05/10/2021 1009    BUN 21 (H) 05/10/2021 1009    CREATININE 0.6 05/10/2021 1009        Component Value Date/Time    CALCIUM 9.0 05/10/2021 1009    ALKPHOS 68 05/10/2021 1009    AST 20 05/10/2021 1009    ALT 10 05/10/2021 1009    BILITOT 0.7 05/10/2021 1009          No results found for: [facility]  Lab Results   Component Value Date    SEDRATE 4 05/10/2021     Lab Results   Component Value Date    CRP <3.0 05/10/2021     Lab Results   Component Value Date    JOSE Negative 03/02/2020    SEDRATE 4 05/10/2021     Lab Results   Component Value Date    RCMBACX 677 (H) 05/10/2021     Lab Results   Component Value Date    TSH 4.93 (H) 08/21/2019     Lab Results   Component Value Date    VITD25 24.6 (L) 04/29/2019         Total time 33 minutes that includes the following-  Preparing to see the patient such as reviewing patients records, pre-charting, preparing the visit on the same day, performing a medically appropriate history and physical examination, counseling and educating patient about diagnosis, management plan, ordering appropriate testings, prescriptions, communicating findings to other care providers, and documenting clinical information in electronic medical record. A/P- See above.

## 2021-09-02 DIAGNOSIS — M06.9 RHEUMATOID ARTHRITIS, INVOLVING UNSPECIFIED SITE, UNSPECIFIED WHETHER RHEUMATOID FACTOR PRESENT (HCC): ICD-10-CM

## 2021-09-02 RX ORDER — HYDROXYCHLOROQUINE SULFATE 200 MG/1
TABLET, FILM COATED ORAL
Qty: 180 TABLET | Refills: 2 | Status: SHIPPED | OUTPATIENT
Start: 2021-09-02 | End: 2022-10-05 | Stop reason: ALTCHOICE

## 2021-09-07 ENCOUNTER — OFFICE VISIT (OUTPATIENT)
Dept: FAMILY MEDICINE CLINIC | Age: 66
End: 2021-09-07
Payer: MEDICARE

## 2021-09-07 VITALS
WEIGHT: 223 LBS | HEART RATE: 53 BPM | SYSTOLIC BLOOD PRESSURE: 130 MMHG | BODY MASS INDEX: 43.78 KG/M2 | HEIGHT: 60 IN | DIASTOLIC BLOOD PRESSURE: 86 MMHG | OXYGEN SATURATION: 94 %

## 2021-09-07 DIAGNOSIS — M33.20 POLYMYOSITIS (HCC): ICD-10-CM

## 2021-09-07 DIAGNOSIS — Z00.00 WELL ADULT EXAM: Primary | ICD-10-CM

## 2021-09-07 DIAGNOSIS — Z12.11 COLON CANCER SCREENING: ICD-10-CM

## 2021-09-07 DIAGNOSIS — Z12.31 ENCOUNTER FOR SCREENING MAMMOGRAM FOR MALIGNANT NEOPLASM OF BREAST: ICD-10-CM

## 2021-09-07 DIAGNOSIS — Z23 NEED FOR 23-POLYVALENT PNEUMOCOCCAL POLYSACCHARIDE VACCINE: ICD-10-CM

## 2021-09-07 DIAGNOSIS — I10 BENIGN ESSENTIAL HTN: ICD-10-CM

## 2021-09-07 DIAGNOSIS — Z00.00 ROUTINE GENERAL MEDICAL EXAMINATION AT A HEALTH CARE FACILITY: ICD-10-CM

## 2021-09-07 DIAGNOSIS — Z78.0 MENOPAUSE: ICD-10-CM

## 2021-09-07 DIAGNOSIS — K21.00 GASTROESOPHAGEAL REFLUX DISEASE WITH ESOPHAGITIS WITHOUT HEMORRHAGE: ICD-10-CM

## 2021-09-07 PROCEDURE — G0439 PPPS, SUBSEQ VISIT: HCPCS | Performed by: FAMILY MEDICINE

## 2021-09-07 PROCEDURE — 90732 PPSV23 VACC 2 YRS+ SUBQ/IM: CPT | Performed by: FAMILY MEDICINE

## 2021-09-07 PROCEDURE — G0009 ADMIN PNEUMOCOCCAL VACCINE: HCPCS | Performed by: FAMILY MEDICINE

## 2021-09-07 SDOH — ECONOMIC STABILITY: FOOD INSECURITY: WITHIN THE PAST 12 MONTHS, THE FOOD YOU BOUGHT JUST DIDN'T LAST AND YOU DIDN'T HAVE MONEY TO GET MORE.: NEVER TRUE

## 2021-09-07 SDOH — ECONOMIC STABILITY: FOOD INSECURITY: WITHIN THE PAST 12 MONTHS, YOU WORRIED THAT YOUR FOOD WOULD RUN OUT BEFORE YOU GOT MONEY TO BUY MORE.: NEVER TRUE

## 2021-09-07 ASSESSMENT — LIFESTYLE VARIABLES
HOW OFTEN DURING THE LAST YEAR HAVE YOU FAILED TO DO WHAT WAS NORMALLY EXPECTED FROM YOU BECAUSE OF DRINKING: 0
HAS A RELATIVE, FRIEND, DOCTOR, OR ANOTHER HEALTH PROFESSIONAL EXPRESSED CONCERN ABOUT YOUR DRINKING OR SUGGESTED YOU CUT DOWN: 0
HOW OFTEN DO YOU HAVE A DRINK CONTAINING ALCOHOL: 3
HOW OFTEN DO YOU HAVE SIX OR MORE DRINKS ON ONE OCCASION: 0
HOW OFTEN DURING THE LAST YEAR HAVE YOU NEEDED AN ALCOHOLIC DRINK FIRST THING IN THE MORNING TO GET YOURSELF GOING AFTER A NIGHT OF HEAVY DRINKING: 0
AUDIT TOTAL SCORE: 3
HAVE YOU OR SOMEONE ELSE BEEN INJURED AS A RESULT OF YOUR DRINKING: 0
HOW OFTEN DURING THE LAST YEAR HAVE YOU HAD A FEELING OF GUILT OR REMORSE AFTER DRINKING: 0
AUDIT-C TOTAL SCORE: 3
HOW OFTEN DURING THE LAST YEAR HAVE YOU FOUND THAT YOU WERE NOT ABLE TO STOP DRINKING ONCE YOU HAD STARTED: 0
HOW OFTEN DURING THE LAST YEAR HAVE YOU BEEN UNABLE TO REMEMBER WHAT HAPPENED THE NIGHT BEFORE BECAUSE YOU HAD BEEN DRINKING: 0
HOW MANY STANDARD DRINKS CONTAINING ALCOHOL DO YOU HAVE ON A TYPICAL DAY: 0

## 2021-09-07 ASSESSMENT — PATIENT HEALTH QUESTIONNAIRE - PHQ9
1. LITTLE INTEREST OR PLEASURE IN DOING THINGS: 0
SUM OF ALL RESPONSES TO PHQ9 QUESTIONS 1 & 2: 0
SUM OF ALL RESPONSES TO PHQ QUESTIONS 1-9: 0
SUM OF ALL RESPONSES TO PHQ QUESTIONS 1-9: 0
2. FEELING DOWN, DEPRESSED OR HOPELESS: 0
SUM OF ALL RESPONSES TO PHQ QUESTIONS 1-9: 0

## 2021-09-07 ASSESSMENT — SOCIAL DETERMINANTS OF HEALTH (SDOH): HOW HARD IS IT FOR YOU TO PAY FOR THE VERY BASICS LIKE FOOD, HOUSING, MEDICAL CARE, AND HEATING?: NOT HARD AT ALL

## 2021-09-07 NOTE — PATIENT INSTRUCTIONS
Personalized Preventive Plan for Todd Hodges - 9/7/2021  Medicare offers a range of preventive health benefits. Some of the tests and screenings are paid in full while other may be subject to a deductible, co-insurance, and/or copay. Some of these benefits include a comprehensive review of your medical history including lifestyle, illnesses that may run in your family, and various assessments and screenings as appropriate. After reviewing your medical record and screening and assessments performed today your provider may have ordered immunizations, labs, imaging, and/or referrals for you. A list of these orders (if applicable) as well as your Preventive Care list are included within your After Visit Summary for your review. Other Preventive Recommendations:    · A preventive eye exam performed by an eye specialist is recommended every 1-2 years to screen for glaucoma; cataracts, macular degeneration, and other eye disorders. · A preventive dental visit is recommended every 6 months. · Try to get at least 150 minutes of exercise per week or 10,000 steps per day on a pedometer . · Order or download the FREE \"Exercise & Physical Activity: Your Everyday Guide\" from [employer]. Call 4-499.822.7039 or search [employer] online. · You need 6057-3255 mg of calcium and 2846-9320 IU of vitamin D per day. It is possible to meet your calcium requirement with diet alone, but a vitamin D supplement is usually necessary to meet this goal.  · When exposed to the sun, use a sunscreen that protects against both UVA and UVB radiation with an SPF of 30 or greater. Reapply every 2 to 3 hours or after sweating, drying off with a towel, or swimming. · Always wear a seat belt when traveling in a car. Always wear a helmet when riding a bicycle or motorcycle.

## 2021-09-07 NOTE — PROGRESS NOTES
Medicare Annual Wellness Visit  Name: Mita Pavon Date: 2021   MRN: [id number] Sex: Female   Age: 77 y.o. Ethnicity: Non- / Non    : 1955 Race: White (non-)      Todd Hodges is here for Medicare AWV    Screenings for behavioral, psychosocial and functional/safety risks, and cognitive dysfunction are all negative except as indicated below. These results, as well as other patient data from the [address] form, are documented in Flowsheets linked to this Encounter. No Known Allergies    Prior to Visit Medications    Medication Sig Taking? Authorizing Provider   TURMERIC PO Take by mouth Yes Historical Provider, MD   hydroxychloroquine (PLAQUENIL) 200 MG tablet TAKE 1 TABLET TWICE A DAY Yes Latisha Horton MD   predniSONE (DELTASONE) 5 MG tablet Take 1 tab po every other day.  Yes Latisha Horton MD   losartan (COZAAR) 100 MG tablet TAKE 1 TABLET BY MOUTH EVERY DAY Yes Aaron Blair MD   mycophenolate (CELLCEPT) 500 MG tablet TAKE 2 TABLETS BY MOUTH TWICE A DAY Yes Latisha Horton MD   Omega-3 Fatty Acids (FISH OIL) 1000 MG CAPS Take 3,000 mg by mouth 3 times daily Yes Historical Provider, MD   B COMPLEX-C PO Take by mouth Yes Historical Provider, MD   Multiple Minerals-Vitamins (CALCIUM-MAGNESIUM-ZINC-D3 PO) Take by mouth Yes Historical Provider, MD   Nutritional Supplements (VITAMIN D BOOSTER PO) Take by mouth Yes Historical Provider, MD   pantoprazole (PROTONIX) 20 MG tablet Take 1 tablet by mouth every morning (before breakfast) Yes Aaron Blair MD       Past Medical History:   Diagnosis Date    Benign essential HTN 3/7/2019    GERD (gastroesophageal reflux disease)     Polymyositis ([facility] [address] 2019    Raynaud's disease without gangrene 3/7/2019       Past Surgical History:   Procedure Laterality Date    BREAST BIOPSY      benign    MUSCLE BIOPSY Left 10/8/2019    LEFT DELTOID MUSCLE BIOPSY, LEFT QUADRICEP MUSCLE BIOPSY and screening values have been reviewed and are found in Flowsheets. The following problems were reviewed today and where indicated follow up appointments were made and/or referrals ordered. Positive Risk Factor Screenings with Interventions:          General Health and ACP:  General  In general, how would you say your health is?: Good  In the past 7 days, have you experienced any of the following?  New or Increased Pain, New or Increased Fatigue, Loneliness, Social Isolation, Stress or Anger?: None of These  Do you get the social and emotional support that you need?: Yes  Do you have a Living Will?: Yes  Advance Directives     Power of  Living Will ACP-Advance Directive ACP-Power of     Not on File Not on File Not on File Not on File      General Health Risk Interventions:  · none    Health Habits/Nutrition:  Health Habits/Nutrition  Do you exercise for at least 20 minutes 2-3 times per week?: Yes  Have you lost any weight without trying in the past 3 months?: No  Do you eat only one meal per day?: No  Have you seen the dentist within the past year?: (!) No  Body mass index: (!) 43.55  Health Habits/Nutrition Interventions:  · none       Personalized Preventive Plan   Current Health Maintenance Status  Immunization History   Administered Date(s) Administered    COVID-19, Day Peter, PF, 30mcg/0.3mL 02/24/2021, 03/17/2021, 08/24/2021    Tdap (Boostrix, Adacel) 11/09/2018        Health Maintenance   Topic Date Due    Colon cancer screen colonoscopy  Never done    Shingles Vaccine (1 of 2) Never done   [employer] Visit (AWV)  Never done    Pneumococcal 65+ years Vaccine (1 of 1 - PPSV23) Never done    Breast cancer screen  11/19/2020    Flu vaccine (1) 09/01/2021    Potassium monitoring  08/16/2022    Creatinine monitoring  08/16/2022    Lipid screen  04/29/2024    DTaP/Tdap/Td vaccine (3 - Td or Tdap) 11/09/2028    DEXA (modify frequency per FRAX score)  Completed    COVID-19 Vaccine Completed    Hepatitis C screen  Completed    Hepatitis A vaccine  Aged Out    Hepatitis B vaccine  Aged Out    Hib vaccine  Aged Out    Meningococcal (ACWY) vaccine  Aged Out     Recommendations for [employer] Due: see orders and patient instructions/AVS.  . Recommended screening schedule for the next 5-10 years is provided to the patient in written form: see Patient Instructions/AVS.    Jessieetta Form was seen today for medicare aw. Diagnoses and all orders for this visit:    Colon cancer screening  -     Cologuard (For External Results Only); Future    Encounter for screening mammogram for malignant neoplasm of breast  -     GUSTABO DIGITAL SCREEN W OR WO CAD BILATERAL; Future    Gastroesophageal reflux disease with esophagitis without hemorrhage    Benign essential HTN    Polymyositis (HCC)    Menopause  -     DEXA BONE DENSITY 2 SITES;  Future

## 2021-10-07 PROBLEM — Z00.00 WELL ADULT EXAM: Status: RESOLVED | Noted: 2019-03-07 | Resolved: 2021-10-07

## 2021-10-18 ENCOUNTER — HOSPITAL ENCOUNTER (OUTPATIENT)
Dept: MAMMOGRAPHY | Age: 66
Discharge: HOME | End: 2021-10-18
Payer: MEDICARE

## 2021-10-18 VITALS — HEIGHT: 60 IN | WEIGHT: 223 LBS | BODY MASS INDEX: 43.78 KG/M2

## 2021-10-18 DIAGNOSIS — Z12.31 ENCOUNTER FOR SCREENING MAMMOGRAM FOR MALIGNANT NEOPLASM OF BREAST: ICD-10-CM

## 2021-10-18 PROCEDURE — 77063 BREAST TOMOSYNTHESIS BI: CPT

## 2021-11-04 ENCOUNTER — HOSPITAL ENCOUNTER (OUTPATIENT)
Dept: GENERAL RADIOLOGY | Age: 66
Discharge: HOME | End: 2021-11-04
Payer: MEDICARE

## 2021-11-04 DIAGNOSIS — Z78.0 MENOPAUSE: ICD-10-CM

## 2021-11-04 PROCEDURE — 77080 DXA BONE DENSITY AXIAL: CPT

## 2021-11-16 ENCOUNTER — OFFICE VISIT (OUTPATIENT)
Dept: RHEUMATOLOGY | Age: 66
End: 2021-11-16
Payer: MEDICARE

## 2021-11-16 VITALS
BODY MASS INDEX: 43.78 KG/M2 | WEIGHT: 223 LBS | SYSTOLIC BLOOD PRESSURE: 122 MMHG | HEIGHT: 60 IN | DIASTOLIC BLOOD PRESSURE: 74 MMHG

## 2021-11-16 DIAGNOSIS — M15.9 GENERALIZED OSTEOARTHRITIS: ICD-10-CM

## 2021-11-16 DIAGNOSIS — Z79.899 HIGH RISK MEDICATION USE: ICD-10-CM

## 2021-11-16 DIAGNOSIS — I73.00 RAYNAUD'S PHENOMENON WITHOUT GANGRENE: ICD-10-CM

## 2021-11-16 DIAGNOSIS — G72.49 INFLAMMATORY MYOPATHY: Primary | ICD-10-CM

## 2021-11-16 LAB
ALT SERPL-CCNC: 10 U/L (ref 10–40)
AST SERPL-CCNC: 22 U/L (ref 15–37)
BASOPHILS ABSOLUTE: 0.1 K/UL (ref 0–0.2)
BASOPHILS RELATIVE PERCENT: 0.9 %
C-REACTIVE PROTEIN: <3 MG/L (ref 0–5.1)
CREAT SERPL-MCNC: 0.7 MG/DL (ref 0.6–1.2)
EOSINOPHILS ABSOLUTE: 0.2 K/UL (ref 0–0.6)
EOSINOPHILS RELATIVE PERCENT: 3 %
GFR AFRICAN AMERICAN: >60
GFR NON-AFRICAN AMERICAN: >60
HCT VFR BLD CALC: 42.7 % (ref 36–48)
HEMOGLOBIN: 13.9 G/DL (ref 12–16)
LYMPHOCYTES ABSOLUTE: 1.9 K/UL (ref 1–5.1)
LYMPHOCYTES RELATIVE PERCENT: 24.7 %
MCH RBC QN AUTO: 28.5 PG (ref 26–34)
MCHC RBC AUTO-ENTMCNC: 32.7 G/DL (ref 31–36)
MCV RBC AUTO: 87.2 FL (ref 80–100)
MONOCYTES ABSOLUTE: 0.6 K/UL (ref 0–1.3)
MONOCYTES RELATIVE PERCENT: 7.8 %
NEUTROPHILS ABSOLUTE: 4.8 K/UL (ref 1.7–7.7)
NEUTROPHILS RELATIVE PERCENT: 63.6 %
PDW BLD-RTO: 13.9 % (ref 12.4–15.4)
PLATELET # BLD: 306 K/UL (ref 135–450)
PMV BLD AUTO: 9.2 FL (ref 5–10.5)
RBC # BLD: 4.89 M/UL (ref 4–5.2)
SEDIMENTATION RATE, ERYTHROCYTE: 8 MM/HR (ref 0–30)
TOTAL CK: 235 U/L (ref 26–192)
WBC # BLD: 7.6 K/UL (ref 4–11)

## 2021-11-16 PROCEDURE — 99214 OFFICE O/P EST MOD 30 MIN: CPT | Performed by: INTERNAL MEDICINE

## 2021-11-16 PROCEDURE — 36415 COLL VENOUS BLD VENIPUNCTURE: CPT | Performed by: INTERNAL MEDICINE

## 2021-11-16 NOTE — PROGRESS NOTES
[address] [place] [address] 56. W) [phone] (F)      Dear Dr. Jess Paula MD:  Please find Rheumatology assessment. Thank you for giving me the opportunity to be involved in Jesu Paul's care and I look forward following Jesu along with you. If you have any questions or concerns please feel free to reach me. Note is transcribed using voice recognition software. Inadvertent computerized transcription errors may be present. Patient identification: Zo Melara: [id number],53 y.o. Sex: female     A/P    Jesu was seen today for follow-up. Diagnoses and all orders for this visit:    Inflammatory myopathy    High risk medication use  -     CK  -     Aldolase  -     AST(SGOT) & ALT(SGPT)  -     CBC Auto Differential  -     C-Reactive Protein  -     Sedimentation Rate  -     Creatinine, Serum    Raynaud's phenomenon without gangrene    Generalized osteoarthritis    BMI 40.0-44.9, adult ([facility]      1. Inflammatory polymyositis-doing well-subjective and objective improvement. Check labs today. Prednisone 5 mg every third day, stop in 1 month. Continue hydroxychloroquine 200 mg twice daily and CellCept 1 g twice daily. Eye exam normal 3/20/2021. Monitoring for myositis-PFTs 9/20/2019- COPD changes. A PFT from 8/2/2021-   PFTs-mild restrictive defect- ? Abdominal obesity. 2. Generalized osteoarthritis-worsening stiffness overall from weather change. No overt flares. 3.Raynaud's phenomena affecting her fingertips without tissue loss-protective measures were discussed. 4.  DEXA scan  11/4/2021 L-spine -0.3, left hip -1, left femoral neck -2.2.  12/23/2019 Normal, left hip -0.4, femoral neck -1.4. Continue calcium 500 mg twice daily and vitamin D supplementation. 5. BMI- 43- wt management discussed- calories count, healthy eating habits, hydration. Patient indicates understanding and agrees with the management plan. I reviewed patient's history, referral documents and electronic medical records. #######################################################################    Xqctkdkveq-mhckxg-qu for inflammatory myositis-polymyositis. Omid Hunter is doing well, other than arthralgias and Raynaud's phenomena getting worse with weather changes. No tissue loss from Raynaud's phenomena. Notices intermittent burning sensation in her right ring finger, self-limiting. Muscle strength has improved back to baseline, has been working out regularly including weights. No focal muscle weakness. Denies any diplopia, dysphagia, cough or shortness of breath or any focal weakness. All other review of systems are negative. PMH, PSH- reviewed. No family history of autoimmune diseases    Current Outpatient Medications   Medication Sig Dispense Refill    TURMERIC PO Take by mouth      hydroxychloroquine (PLAQUENIL) 200 MG tablet TAKE 1 TABLET TWICE A  tablet 2    predniSONE (DELTASONE) 5 MG tablet Take 1 tab po every other day.  45 tablet 0    losartan (COZAAR) 100 MG tablet TAKE 1 TABLET BY MOUTH EVERY DAY 90 tablet 3    mycophenolate (CELLCEPT) 500 MG tablet TAKE 2 TABLETS BY MOUTH TWICE A  tablet 0    Omega-3 Fatty Acids (FISH OIL) 1000 MG CAPS Take 3,000 mg by mouth 3 times daily      B COMPLEX-C PO Take by mouth      Multiple Minerals-Vitamins (CALCIUM-MAGNESIUM-ZINC-D3 PO) Take by mouth      Nutritional Supplements (VITAMIN D BOOSTER PO) Take by mouth      pantoprazole (PROTONIX) 20 MG tablet Take 1 tablet by mouth every morning (before breakfast) 30 tablet 5     No current facility-administered medications for this visit. No Known Allergies    PHYSICAL EXAM:    Vitals:    /74   Ht 5' (1.524 m)   Wt 223 lb (101.2 kg)   BMI 43.55 kg/m²   General appearance/ Psychiatric: well nourished, and well groomed, normal judgement, alert, appears stated age and cooperative. MKS: Other than OA changes in her PIPs and DIPs, knees-no appreciable tender swollen inflamed joints. Normal muscle strength in upper and lower extremities proximally and distally. Able to get up from sitting position without assistance. Able to squat somewhat, is limited because of osteoarthritis in weightbearing joints and still has muscle deconditioning. Skin: No rashes. no tissue loss. No rashes, no induration or skin thickening or nodules. No evidence ischemia or deformities noted in digits or nails. Chest -normal effort, no added sounds.        DATA:   Lab Results   Component Value Date    WBC 8.0 09/07/2021    HGB 13.8 09/07/2021    HCT 42.5 09/07/2021    MCV 88.0 09/07/2021     09/07/2021         Chemistry        Component Value Date/Time     09/07/2021 1034    K 4.1 09/07/2021 1034    CL 98 (L) 09/07/2021 1034    CO2 20 (L) 09/07/2021 1034    BUN 13 09/07/2021 1034    CREATININE <0.5 (L) 09/07/2021 1034        Component Value Date/Time    CALCIUM 9.4 09/07/2021 1034    ALKPHOS 68 09/07/2021 1034    AST 18 09/07/2021 1034    ALT 8 (L) 09/07/2021 1034    BILITOT 1.0 09/07/2021 1034          No results found for: [facility]  Lab Results   Component Value Date    SEDRATE 5 08/16/2021     Lab Results   Component Value Date    CRP 3.1 08/16/2021     Lab Results   Component Value Date    JOSE Negative 03/02/2020    SEDRATE 5 08/16/2021     Lab Results   Component Value Date    CKTOTAL 382 (H) 08/16/2021     Lab Results   Component Value Date    TSH 4.93 (H) 08/21/2019     Lab Results   Component Value Date    VITD25 24.6 (L) 04/29/2019         Total time 35 minutes that includes the following-  Preparing to see the patient such as reviewing patients records, pre-charting, preparing the visit on the same day, performing a medically appropriate history and physical examination, counseling and educating patient about diagnosis, management plan, ordering appropriate testings, prescriptions, communicating findings to other care providers, and documenting clinical information in electronic medical record. A/P- See above.

## 2021-11-18 LAB — ALDOLASE: 6.2 U/L (ref 1.5–8.1)

## 2021-12-06 RX ORDER — MYCOPHENOLATE MOFETIL 500 MG/1
TABLET ORAL
Qty: 360 TABLET | Refills: 0 | Status: SHIPPED | OUTPATIENT
Start: 2021-12-06 | End: 2022-02-28

## 2022-02-15 ENCOUNTER — OFFICE VISIT (OUTPATIENT)
Dept: RHEUMATOLOGY | Age: 67
End: 2022-02-15
Payer: MEDICARE

## 2022-02-15 VITALS
HEIGHT: 60 IN | DIASTOLIC BLOOD PRESSURE: 86 MMHG | WEIGHT: 230 LBS | BODY MASS INDEX: 45.16 KG/M2 | SYSTOLIC BLOOD PRESSURE: 136 MMHG

## 2022-02-15 DIAGNOSIS — G72.49 INFLAMMATORY MYOPATHY: Primary | ICD-10-CM

## 2022-02-15 DIAGNOSIS — Z79.899 HIGH RISK MEDICATION USE: ICD-10-CM

## 2022-02-15 DIAGNOSIS — M15.9 GENERALIZED OSTEOARTHRITIS: ICD-10-CM

## 2022-02-15 LAB
ALT SERPL-CCNC: 13 U/L (ref 10–40)
AST SERPL-CCNC: 21 U/L (ref 15–37)
BASOPHILS ABSOLUTE: 0 K/UL (ref 0–0.2)
BASOPHILS RELATIVE PERCENT: 0.6 %
C-REACTIVE PROTEIN: 4.4 MG/L (ref 0–5.1)
CREAT SERPL-MCNC: 0.5 MG/DL (ref 0.6–1.2)
EOSINOPHILS ABSOLUTE: 0.1 K/UL (ref 0–0.6)
EOSINOPHILS RELATIVE PERCENT: 1.4 %
GFR AFRICAN AMERICAN: >60
GFR NON-AFRICAN AMERICAN: >60
HCT VFR BLD CALC: 43.5 % (ref 36–48)
HEMOGLOBIN: 14.2 G/DL (ref 12–16)
LYMPHOCYTES ABSOLUTE: 1.8 K/UL (ref 1–5.1)
LYMPHOCYTES RELATIVE PERCENT: 27 %
MCH RBC QN AUTO: 28.4 PG (ref 26–34)
MCHC RBC AUTO-ENTMCNC: 32.7 G/DL (ref 31–36)
MCV RBC AUTO: 86.9 FL (ref 80–100)
MONOCYTES ABSOLUTE: 0.5 K/UL (ref 0–1.3)
MONOCYTES RELATIVE PERCENT: 6.8 %
NEUTROPHILS ABSOLUTE: 4.3 K/UL (ref 1.7–7.7)
NEUTROPHILS RELATIVE PERCENT: 64.2 %
PDW BLD-RTO: 14.2 % (ref 12.4–15.4)
PLATELET # BLD: 244 K/UL (ref 135–450)
PMV BLD AUTO: 8.8 FL (ref 5–10.5)
RBC # BLD: 5.01 M/UL (ref 4–5.2)
SEDIMENTATION RATE, ERYTHROCYTE: 20 MM/HR (ref 0–30)
TOTAL CK: 140 U/L (ref 26–192)
WBC # BLD: 6.7 K/UL (ref 4–11)

## 2022-02-15 PROCEDURE — 99215 OFFICE O/P EST HI 40 MIN: CPT | Performed by: INTERNAL MEDICINE

## 2022-02-15 NOTE — PROGRESS NOTES
[address] [facility] 56. W) [phone] (F)      Dear Dr. Ning Siu MD:  Please find Rheumatology assessment. Thank you for giving me the opportunity to be involved in Jesu Paul's care and I look forward following Jesu along with you. If you have any questions or concerns please feel free to reach me. Note is transcribed using voice recognition software. Inadvertent computerized transcription errors may be present. Patient identification: Ron Linda: [id number],75 y.o. Sex: female     A/P    Jesu was seen today for follow-up. Diagnoses and all orders for this visit:    Inflammatory myopathy    High risk medication use  -     AST(SGOT) & ALT(SGPT); Standing  -     CBC Auto Differential; Standing  -     C-Reactive Protein; Standing  -     Creatinine, Serum; Standing  -     Sedimentation Rate; Standing  -     CK; Future  -     Aldolase; Future    Generalized osteoarthritis    BMI 40.0-44.9, adult ([facility]      1. Inflammatory polymyositis-subjective and objective improvement in his symptoms. Tolerating medications well, on hydroxychloroquine 200 mg daily CellCept 1 g twice daily. Off of prednisone. Safety labs will be checked today. Prescription updated. Monitoring for myositis-PFTs 9/20/2019- COPD changes. A PFT from 8/2/2021-   PFTs-mild restrictive defect- ? Abdominal obesity. 2. Generalized osteoarthritis-worsening symptoms in both knees. Weight reduction is the key to delay the progression of osteoarthritis.     3.Raynaud's phenomena affecting her fingertips without tissue loss-protective measures were down stairs. She is experiencing discomfort especially in the right knee with weightbearing, walking, has advanced osteoarthritis. Is not interested in knee replacement. Raynaud's phenomena is persistent no tissue loss. Manageable. No dysphagia, focal muscle or joint pain. Has been trying to lose weight, not successful. Loves to eat baked goods. Has been keeping good hydration. .All other review of systems are negative. PMH, PSH- reviewed. No family history of autoimmune diseases    Current Outpatient Medications   Medication Sig Dispense Refill    mycophenolate (CELLCEPT) 500 MG tablet TAKE 2 TABLETS BY MOUTH TWICE A  tablet 0    TURMERIC PO Take by mouth      hydroxychloroquine (PLAQUENIL) 200 MG tablet TAKE 1 TABLET TWICE A  tablet 2    losartan (COZAAR) 100 MG tablet TAKE 1 TABLET BY MOUTH EVERY DAY 90 tablet 3    Omega-3 Fatty Acids (FISH OIL) 1000 MG CAPS Take 3,000 mg by mouth 3 times daily      B COMPLEX-C PO Take by mouth      Multiple Minerals-Vitamins (CALCIUM-MAGNESIUM-ZINC-D3 PO) Take by mouth      Nutritional Supplements (VITAMIN D BOOSTER PO) Take by mouth      pantoprazole (PROTONIX) 20 MG tablet Take 1 tablet by mouth every morning (before breakfast) 30 tablet 5     No current facility-administered medications for this visit. No Known Allergies    PHYSICAL EXAM:    Vitals:    /86   Ht 5' (1.524 m)   Wt 230 lb (104.3 kg)   BMI 44.92 kg/m²   General appearance/ Psychiatric: well nourished, and well groomed, normal judgement, alert, appears stated age and cooperative. MKS: OA changes in both knees, right worse than left with bony crepitus, mild valgus deformity. OA changes across her DIPs, PIPs. Able to squat somewhat, is limited because of osteoarthritis in weightbearing joints and still has muscle deconditioning. Skin: No rashes. no tissue loss. No rashes, no induration or skin thickening or nodules.  No evidence ischemia or deformities noted in digits or nails. Chest -normal effort, no added sounds. DATA:   Lab Results   Component Value Date    WBC 7.6 11/16/2021    HGB 13.9 11/16/2021    HCT 42.7 11/16/2021    MCV 87.2 11/16/2021     11/16/2021         Chemistry        Component Value Date/Time     09/07/2021 1034    K 4.1 09/07/2021 1034    CL 98 (L) 09/07/2021 1034    CO2 20 (L) 09/07/2021 1034    BUN 13 09/07/2021 1034    CREATININE 0.7 11/16/2021 0903        Component Value Date/Time    CALCIUM 9.4 09/07/2021 1034    ALKPHOS 68 09/07/2021 1034    AST 22 11/16/2021 0903    ALT 10 11/16/2021 0903    BILITOT 1.0 09/07/2021 1034          No results found for: [facility]  Lab Results   Component Value Date    SEDRATE 8 11/16/2021     Lab Results   Component Value Date    CRP <3.0 11/16/2021     Lab Results   Component Value Date    JOSE Negative 03/02/2020    SEDRATE 8 11/16/2021     Lab Results   Component Value Date    CKTOTAL 235 (H) 11/16/2021     Lab Results   Component Value Date    TSH 4.93 (H) 08/21/2019     Lab Results   Component Value Date    VITD25 24.6 (L) 04/29/2019         Total time >40 minutes that includes the following-  Preparing to see the patient such as reviewing patients records, pre-charting, preparing the visit on the same day, performing a medically appropriate history and physical examination, counseling and educating patient about diagnosis, management plan, ordering appropriate testings, prescriptions, communicating findings to other care providers, and documenting clinical information in electronic medical record. A/P- See above.

## 2022-02-20 LAB — ALDOLASE: 2.4 U/L (ref 1.5–8.1)

## 2022-02-28 RX ORDER — MYCOPHENOLATE MOFETIL 500 MG/1
TABLET ORAL
Qty: 360 TABLET | Refills: 0 | Status: SHIPPED | OUTPATIENT
Start: 2022-02-28

## 2022-05-13 ENCOUNTER — OFFICE VISIT (OUTPATIENT)
Dept: FAMILY MEDICINE CLINIC | Age: 67
End: 2022-05-13
Payer: MEDICARE

## 2022-05-13 VITALS
HEART RATE: 102 BPM | WEIGHT: 221.6 LBS | BODY MASS INDEX: 43.51 KG/M2 | SYSTOLIC BLOOD PRESSURE: 150 MMHG | OXYGEN SATURATION: 98 % | HEIGHT: 60 IN | DIASTOLIC BLOOD PRESSURE: 90 MMHG

## 2022-05-13 DIAGNOSIS — M33.20 POLYMYOSITIS (HCC): ICD-10-CM

## 2022-05-13 DIAGNOSIS — K59.1 FUNCTIONAL DIARRHEA: ICD-10-CM

## 2022-05-13 DIAGNOSIS — K21.00 GASTROESOPHAGEAL REFLUX DISEASE WITH ESOPHAGITIS WITHOUT HEMORRHAGE: ICD-10-CM

## 2022-05-13 PROCEDURE — 99213 OFFICE O/P EST LOW 20 MIN: CPT | Performed by: FAMILY MEDICINE

## 2022-05-13 RX ORDER — FAMOTIDINE 20 MG/1
20 TABLET, FILM COATED ORAL 2 TIMES DAILY
Qty: 180 TABLET | Refills: 3 | Status: SHIPPED | OUTPATIENT
Start: 2022-05-13

## 2022-05-13 ASSESSMENT — PATIENT HEALTH QUESTIONNAIRE - PHQ9
SUM OF ALL RESPONSES TO PHQ QUESTIONS 1-9: 0
SUM OF ALL RESPONSES TO PHQ9 QUESTIONS 1 & 2: 0
SUM OF ALL RESPONSES TO PHQ QUESTIONS 1-9: 0
1. LITTLE INTEREST OR PLEASURE IN DOING THINGS: 0
SUM OF ALL RESPONSES TO PHQ QUESTIONS 1-9: 0
SUM OF ALL RESPONSES TO PHQ QUESTIONS 1-9: 0
2. FEELING DOWN, DEPRESSED OR HOPELESS: 0

## 2022-05-13 ASSESSMENT — ENCOUNTER SYMPTOMS
DIARRHEA: 1
ABDOMINAL PAIN: 0
BLOATING: 0
FLATUS: 0
VOMITING: 0
COUGH: 0

## 2022-05-13 NOTE — PROGRESS NOTES
Subjective:     Patient Pilar Mohamud is a 79 y.o. female. Diarrhea   This is a recurrent problem. The current episode started 1 to 4 weeks ago. The problem occurs 2 to 4 times per day. The problem has been unchanged. The stool consistency is described as watery. The patient states that diarrhea does not awaken her from sleep. Pertinent negatives include no abdominal pain, arthralgias, bloating, chills, coughing, fever, headaches, increased  flatus, myalgias, sweats, URI, vomiting or weight loss. Nothing aggravates the symptoms. Treatments tried: see meds. The treatment provided moderate relief. There is no history of bowel resection, inflammatory bowel disease, irritable bowel syndrome, malabsorption, a recent abdominal surgery or short gut syndrome.     --grandchildren ill with similar illness    No Known Allergies    Current Outpatient Medications   Medication Sig Dispense Refill    famotidine (PEPCID) 20 MG tablet Take 1 tablet by mouth 2 times daily 180 tablet 3    mycophenolate (CELLCEPT) 500 MG tablet TAKE 2 TABLETS BY MOUTH TWICE A  tablet 0    TURMERIC PO Take by mouth      hydroxychloroquine (PLAQUENIL) 200 MG tablet TAKE 1 TABLET TWICE A  tablet 2    losartan (COZAAR) 100 MG tablet TAKE 1 TABLET BY MOUTH EVERY DAY 90 tablet 3    Omega-3 Fatty Acids (FISH OIL) 1000 MG CAPS Take 3,000 mg by mouth 3 times daily      B COMPLEX-C PO Take by mouth      Multiple Minerals-Vitamins (CALCIUM-MAGNESIUM-ZINC-D3 PO) Take by mouth      Nutritional Supplements (VITAMIN D BOOSTER PO) Take by mouth      pantoprazole (PROTONIX) 20 MG tablet Take 1 tablet by mouth every morning (before breakfast) 30 tablet 5     No current facility-administered medications for this visit.        Past Medical History:   Diagnosis Date    Benign essential HTN 3/7/2019    GERD (gastroesophageal reflux disease)     Polymyositis ([facility] [address] 8/21/2019    Raynaud's disease without gangrene 3/7/2019       Past Surgical History:   Procedure Laterality Date    BREAST BIOPSY      benign    MUSCLE BIOPSY Left 10/8/2019    LEFT DELTOID MUSCLE BIOPSY, LEFT QUADRICEP MUSCLE BIOPSY performed by Deondre Askew DO at [address] History     Socioeconomic History    Marital status:      Spouse name: Not on file    Number of children: 2    Years of education: Not on file    Highest education level: Not on file   Occupational History    Occupation: housewife   Tobacco Use    Smoking status: Former Smoker     Packs/day: 2.00     Years: 15.00     Pack years: 30.00     Types: Cigarettes     Quit date: 3/20/2005     Years since quittin.1    Smokeless tobacco: Never Used   Vaping Use    Vaping Use: Never used   Substance and Sexual Activity    Alcohol use: Yes     Comment: 2 per night    Drug use: Never    Sexual activity: Yes     Partners: Male   Other Topics Concern    Not on file   Social History Narrative    Happily --has special needs kid    Hobbies--reads--puzzles     Social Determinants of Health     Financial Resource Strain: Low Risk     Difficulty of Paying Living Expenses: Not hard at all   Food Insecurity: No Food Insecurity    Worried About [address] in the Last Year: Never true    [facility] in the Last Year: Never true   Transportation Needs:     Lack of Transportation (Medical): Not on file    Lack of Transportation (Non-Medical):  Not on file   Physical Activity:     Days of Exercise per Week: Not on file    Minutes of Exercise per Session: Not on file   Stress:     Feeling of Stress : Not on file   Social Connections:     Frequency of Communication with Friends and Family: Not on file    Frequency of Social Gatherings with Friends and Family: Not on file    Attends [employer] Services: Not on file    Active Member of Clubs or Organizations: Not on file    Attends Club or Organization Meetings: Not on file    Marital Status: Not on file   Intimate Partner Violence:     Fear of Current or Ex-Partner: Not on file    Emotionally Abused: Not on file    Physically Abused: Not on file    Sexually Abused: Not on file   Housing Stability:     Unable to Pay for Housing in the Last Year: Not on file    Number of [place] in the Last Year: Not on file    Unstable Housing in the Last Year: Not on file       Family History   Problem Relation Age of Onset    Breast Cancer Mother 50       Immunization History   Administered Date(s) Administered    COVID-19, [employer] top, DO NOT Dilute, Alvin-Sucrose, 12+ yrs, PF, 30 mcg/0.3 mL dose 02/24/2022    COVID-19, Pfizer Purple top, DILUTE for use, 12+ yrs, 30mcg/0.3mL dose 02/24/2021, 03/17/2021, 08/24/2021    Pneumococcal Polysaccharide ([id number]) 09/07/2021    Tdap (Boostrix, Adacel) 11/09/2018       Review of Systems  Review of Systems   Constitutional: Negative for chills, fever and weight loss. Respiratory: Negative for cough. Gastrointestinal: Positive for diarrhea. Negative for abdominal pain, bloating, flatus and vomiting. Musculoskeletal: Negative for arthralgias and myalgias. Neurological: Negative for headaches. Objective:   Physical Exam  Physical Exam  Vitals reviewed. Constitutional:       General: She is not in acute distress. Appearance: She is well-developed. Eyes:      Conjunctiva/sclera: Conjunctivae normal.      Pupils: Pupils are equal, round, and reactive to light. Neck:      Thyroid: No thyromegaly. Vascular: No JVD. Trachea: No tracheal deviation. Cardiovascular:      Rate and Rhythm: Normal rate and regular rhythm. Heart sounds: Normal heart sounds. No murmur heard. No gallop. Pulmonary:      Effort: Pulmonary effort is normal. No respiratory distress. Breath sounds: Normal breath sounds. No stridor. No wheezing or rales. Chest:      Chest wall: No tenderness. Abdominal:      General: Bowel sounds are normal. There is no distension. Palpations: Abdomen is soft. There is no mass. Tenderness: There is no abdominal tenderness. Musculoskeletal:         General: No tenderness. Lymphadenopathy:      Cervical: No cervical adenopathy. Skin:     General: Skin is warm and dry. Coloration: Skin is not pale. Findings: No erythema or rash. Neurological:      Mental Status: She is alert and oriented to person, place, and time. Cranial Nerves: No cranial nerve deficit. Motor: No abnormal muscle tone. Coordination: Coordination normal.      Deep Tendon Reflexes: Reflexes normal.   Psychiatric:         Behavior: Behavior normal.         Thought Content:  Thought content normal.         Judgment: Judgment normal.         Assessment and Plan:     Gastroesophageal reflux disease with esophagitis   Uncontrolled, change to pepcid    Functional diarrhea   DC fish oil--change to pepcid--consider tx for IBS    Polymyositis (HCC)   Well-controlled, continue current medications

## 2022-05-17 ENCOUNTER — TELEMEDICINE (OUTPATIENT)
Dept: RHEUMATOLOGY | Age: 67
End: 2022-05-17
Payer: MEDICARE

## 2022-05-17 DIAGNOSIS — G72.49 INFLAMMATORY MYOPATHY: Primary | ICD-10-CM

## 2022-05-17 DIAGNOSIS — Z79.899 HIGH RISK MEDICATION USE: ICD-10-CM

## 2022-05-17 DIAGNOSIS — M15.9 GENERALIZED OSTEOARTHRITIS: ICD-10-CM

## 2022-05-17 PROCEDURE — 99214 OFFICE O/P EST MOD 30 MIN: CPT | Performed by: INTERNAL MEDICINE

## 2022-05-17 RX ORDER — HYDROXYCHLOROQUINE SULFATE 200 MG/1
200 TABLET, FILM COATED ORAL 2 TIMES DAILY
Qty: 180 TABLET | Refills: 3 | Status: SHIPPED | OUTPATIENT
Start: 2022-05-17 | End: 2022-10-05 | Stop reason: ALTCHOICE

## 2022-05-17 RX ORDER — MYCOPHENOLATE MOFETIL 500 MG/1
TABLET ORAL
Qty: 360 TABLET | Refills: 0 | Status: SHIPPED | OUTPATIENT
Start: 2022-05-17 | End: 2022-06-01 | Stop reason: SDUPTHER

## 2022-05-17 NOTE — PROGRESS NOTES
[address] [place] [address] 56. (d) [phone] (F)      Dear Dr. Vipin Rivera MD:  Please find Rheumatology assessment. Thank you for giving me the opportunity to be involved in José Miguel Paul's care and I look forward following José Miguel Reyes along with you. If you have any questions or concerns please feel free to reach me. Note is transcribed using voice recognition software. Inadvertent computerized transcription errors may be present. Patient identification: Olvin Tinajero: [id number],89 y.o. Sex: female     A/P    Diagnoses and all orders for this visit:    Inflammatory myopathy  -     hydroxychloroquine (PLAQUENIL) 200 MG tablet; Take 1 tablet by mouth 2 times daily    High risk medication use  -     CK; Future  -     Aldolase; Future  -     CBC with Auto Differential; Future  -     Hepatic Function Panel; Future  -     Creatinine; Future    Generalized osteoarthritis    Other orders  -     mycophenolate (CELLCEPT) 500 MG tablet; Take 2 tab po BID  Safety alert: Labs as recommended by prescribing MD.      1. Inflammatory polymyositis-subjective and objective improvement in his symptoms. Remained stable, normal ADLs and recreational activities. Given what appears to be \" Viral infectious diarrhea\" , advised patient holding meds for next 5-7 days, thereafter she can resume her current medications- hydroxychloroquine 200 mg daily CellCept 1 g twice daily. Safety labs to be checked after GI symptoms resolve. If she continues to have persistent diarrhea, follow-up with PCP. Monitoring for myositis-PFTs 9/20/2019- COPD changes.                                        A PFT from 8/2/2021-   PFTs-mild restrictive defect- ? Abdominal obesity. 2. Generalized osteoarthritis-stable. Continue acetaminophen as needed. 3.Raynaud's phenomena affecting her fingertips without tissue loss-protective measures were discussed. 4. DEXA scan-no fragility fractures. Stay on calcium and vitamin D supplementation. 11/4/2021 L-spine -0.3, left hip -1, left femoral neck -2.2. FRAX score-10-year risk of major osteoporotic fracture 10% and hip fracture 1.6%. No need for pharmacologic intervention. 12/23/2019 Normal, left hip -0.4, femoral neck -1.4.       5. BMI- 43-patient is aware of high BMI, and the benefits of weight reduction in short and long-term. Calorie count  , Avoiding or limiting refined carbohydrates is the key. RTC 3 months. Patient indicates understanding and agrees with the management plan. I reviewed patient's history, referral documents and electronic medical records. #######################################################################    Atqrukjhwg-hczuhv-mn for inflammatory myositis-polymyositis. Interval changes-  Tells me that several family members have been having viral diarrhea over the last several weeks, and she started having abdominal cramping and intermittent diarrhea for past 2 weeks, has gotten much worse this morning. Does not have upper GI symptoms now. Overall feels okay but just has to go to bathroom constantly. She remained stable in terms of myositis and osteoarthritis as well as osteopenia. Raynaud's phenomena-persistent, no tissue loss. Minor symptoms. Manageable without any medications. Tolerating medications well. Other than current GI symptoms, she is doing well. PMH, PSH- reviewed.         No family history of autoimmune diseases    Current Outpatient Medications   Medication Sig Dispense Refill    hydroxychloroquine (PLAQUENIL) 200 MG tablet Take 1 tablet by mouth 2 times daily 180 tablet 3    mycophenolate (CELLCEPT) 500 MG tablet Take 2 tab po BID  Safety alert: Labs as recommended by prescribing MD. 360 tablet 0    famotidine (PEPCID) 20 MG tablet Take 1 tablet by mouth 2 times daily 180 tablet 3    mycophenolate (CELLCEPT) 500 MG tablet TAKE 2 TABLETS BY MOUTH TWICE A  tablet 0    TURMERIC PO Take by mouth      hydroxychloroquine (PLAQUENIL) 200 MG tablet TAKE 1 TABLET TWICE A  tablet 2    losartan (COZAAR) 100 MG tablet TAKE 1 TABLET BY MOUTH EVERY DAY 90 tablet 3    Omega-3 Fatty Acids (FISH OIL) 1000 MG CAPS Take 3,000 mg by mouth 3 times daily      B COMPLEX-C PO Take by mouth      Multiple Minerals-Vitamins (CALCIUM-MAGNESIUM-ZINC-D3 PO) Take by mouth      Nutritional Supplements (VITAMIN D BOOSTER PO) Take by mouth      pantoprazole (PROTONIX) 20 MG tablet Take 1 tablet by mouth every morning (before breakfast) 30 tablet 5     No current facility-administered medications for this visit. No Known Allergies    PHYSICAL EXAM:    Vitals: There were no vitals taken for this visit. General appearance/ Psychiatric: well nourished, and well groomed, normal judgement, alert, appears stated age and cooperative. MKS: No findings to be appreciated in [facility]. Skin: No rashes.      DATA:   Lab Results   Component Value Date    WBC 6.7 02/15/2022    HGB 14.2 02/15/2022    HCT 43.5 02/15/2022    MCV 86.9 02/15/2022     02/15/2022         Chemistry        Component Value Date/Time     09/07/2021 1034    K 4.1 09/07/2021 1034    CL 98 (L) 09/07/2021 1034    CO2 20 (L) 09/07/2021 1034    BUN 13 09/07/2021 1034    CREATININE 0.5 (L) 02/15/2022 1130        Component Value Date/Time    CALCIUM 9.4 09/07/2021 1034    ALKPHOS 68 09/07/2021 1034    AST 21 02/15/2022 1130    ALT 13 02/15/2022 1130    BILITOT 1.0 09/07/2021 1034          No results found for: [facility]  Lab Results   Component Value Date    SEDRATE 20 02/15/2022     Lab Results   Component Value Date    CRP 4.4 02/15/2022     Lab Results Component Value Date    JOSE Negative 03/02/2020    SEDRATE 20 02/15/2022     Lab Results   Component Value Date    CKTOTAL 140 02/15/2022     Lab Results   Component Value Date    TSH 4.93 (H) 08/21/2019     Lab Results   Component Value Date    VITD25 24.6 (L) 04/29/2019         Total time 32minutes that includes the following-  Preparing to see the patient such as reviewing patients records, pre-charting, preparing the visit on the same day, performing a medically appropriate history and physical examination, counseling and educating patient about diagnosis, management plan, ordering appropriate testings, prescriptions, communicating findings to other care providers, and documenting clinical information in electronic medical record. Willis Rockwell, was evaluated through a synchronous (real-time) audio-video encounter. The patient (or guardian if applicable) is aware that this is a billable service, which includes applicable co-pays. This Virtual Visit was conducted with patient's (and/or legal guardian's) consent. The visit was conducted pursuant to the emergency declaration under the [address] [place] [address], [address] [place] [address] waiver authority and the [employer] and [employer] General Act. Patient identification was verified, and a caregiver was present when appropriate. The patient was located at home in a state where the provider was licensed to provide care.

## 2022-05-23 DIAGNOSIS — Z79.899 HIGH RISK MEDICATION USE: ICD-10-CM

## 2022-05-23 DIAGNOSIS — R19.7 DIARRHEA OF PRESUMED INFECTIOUS ORIGIN: Primary | ICD-10-CM

## 2022-05-23 LAB
ALBUMIN SERPL-MCNC: 4.4 G/DL (ref 3.4–5)
ALP BLD-CCNC: 88 U/L (ref 40–129)
ALT SERPL-CCNC: 8 U/L (ref 10–40)
AST SERPL-CCNC: 15 U/L (ref 15–37)
BASOPHILS ABSOLUTE: 0 K/UL (ref 0–0.2)
BASOPHILS RELATIVE PERCENT: 0.5 %
BILIRUB SERPL-MCNC: 0.9 MG/DL (ref 0–1)
BILIRUBIN DIRECT: <0.2 MG/DL (ref 0–0.3)
BILIRUBIN, INDIRECT: ABNORMAL MG/DL (ref 0–1)
EOSINOPHILS ABSOLUTE: 0.1 K/UL (ref 0–0.6)
EOSINOPHILS RELATIVE PERCENT: 2.4 %
HCT VFR BLD CALC: 39.3 % (ref 36–48)
HEMOGLOBIN: 13.2 G/DL (ref 12–16)
LYMPHOCYTES ABSOLUTE: 1.8 K/UL (ref 1–5.1)
LYMPHOCYTES RELATIVE PERCENT: 32.3 %
MCH RBC QN AUTO: 28.8 PG (ref 26–34)
MCHC RBC AUTO-ENTMCNC: 33.6 G/DL (ref 31–36)
MCV RBC AUTO: 85.7 FL (ref 80–100)
MONOCYTES ABSOLUTE: 0.4 K/UL (ref 0–1.3)
MONOCYTES RELATIVE PERCENT: 7.8 %
NEUTROPHILS ABSOLUTE: 3.1 K/UL (ref 1.7–7.7)
NEUTROPHILS RELATIVE PERCENT: 57 %
PDW BLD-RTO: 14.1 % (ref 12.4–15.4)
PLATELET # BLD: 239 K/UL (ref 135–450)
PMV BLD AUTO: 8.9 FL (ref 5–10.5)
RBC # BLD: 4.58 M/UL (ref 4–5.2)
TOTAL CK: 86 U/L (ref 26–192)
TOTAL PROTEIN: 6.7 G/DL (ref 6.4–8.2)
WBC # BLD: 5.4 K/UL (ref 4–11)

## 2022-05-24 DIAGNOSIS — R19.7 DIARRHEA OF PRESUMED INFECTIOUS ORIGIN: ICD-10-CM

## 2022-05-24 LAB
GI BACTERIAL PATHOGENS BY PCR: NORMAL
WHITE BLOOD CELLS (WBC), STOOL: NORMAL

## 2022-05-25 DIAGNOSIS — R30.0 DYSURIA: Primary | ICD-10-CM

## 2022-05-25 LAB
ALDOLASE: 2 U/L (ref 1.2–7.6)
BACTERIA: ABNORMAL /HPF
BILIRUBIN URINE: NEGATIVE
BLOOD, URINE: NEGATIVE
CLARITY: ABNORMAL
COLOR: YELLOW
EPITHELIAL CELLS, UA: 14 /HPF (ref 0–5)
GLUCOSE URINE: NEGATIVE MG/DL
HYALINE CASTS: 1 /LPF (ref 0–8)
KETONES, URINE: NEGATIVE MG/DL
LEUKOCYTE ESTERASE, URINE: ABNORMAL
MICROSCOPIC EXAMINATION: YES
NITRITE, URINE: NEGATIVE
PH UA: 6 (ref 5–8)
PROTEIN UA: ABNORMAL MG/DL
RBC UA: 2 /HPF (ref 0–4)
SPECIFIC GRAVITY UA: 1.02 (ref 1–1.03)
URINE TYPE: ABNORMAL
UROBILINOGEN, URINE: 0.2 E.U./DL
WBC UA: 14 /HPF (ref 0–5)

## 2022-05-25 RX ORDER — CEPHALEXIN 500 MG/1
500 CAPSULE ORAL 3 TIMES DAILY
Qty: 21 CAPSULE | Refills: 0 | Status: SHIPPED | OUTPATIENT
Start: 2022-05-25 | End: 2022-06-01

## 2022-05-27 LAB
ORGANISM: ABNORMAL
ORGANISM: ABNORMAL
URINE CULTURE, ROUTINE: ABNORMAL

## 2022-05-31 RX ORDER — MYCOPHENOLATE MOFETIL 500 MG/1
TABLET ORAL
Qty: 360 TABLET | Refills: 0 | OUTPATIENT
Start: 2022-05-31

## 2022-06-01 RX ORDER — MYCOPHENOLATE MOFETIL 500 MG/1
TABLET ORAL
Qty: 360 TABLET | Refills: 0 | Status: SHIPPED | OUTPATIENT
Start: 2022-06-01 | End: 2022-10-05 | Stop reason: ALTCHOICE

## 2022-06-28 RX ORDER — FLUCONAZOLE 150 MG/1
150 TABLET ORAL ONCE
Qty: 2 TABLET | Refills: 0 | Status: SHIPPED | OUTPATIENT
Start: 2022-06-28 | End: 2022-06-28

## 2022-07-04 RX ORDER — LOSARTAN POTASSIUM 100 MG/1
TABLET ORAL
Qty: 90 TABLET | Refills: 3 | Status: SHIPPED | OUTPATIENT
Start: 2022-07-04

## 2022-08-15 ENCOUNTER — PATIENT MESSAGE (OUTPATIENT)
Dept: FAMILY MEDICINE CLINIC | Age: 67
End: 2022-08-15

## 2022-08-15 RX ORDER — LIDOCAINE AND PRILOCAINE 25; 25 MG/G; MG/G
CREAM TOPICAL
Qty: 30 G | Refills: 0 | Status: SHIPPED | OUTPATIENT
Start: 2022-08-15

## 2022-08-15 NOTE — TELEPHONE ENCOUNTER
From: Mily Soto  To: Dr. Simons Human: 8/15/2022 12:42 PM EDT  Subject: rectal pain    The burning and itching had subsided a little, in fact yesterday was the most normal day I had in a long time. Then this morning I had three small bowl movements and the burning and itching is back. It is immediate right after I pee or poop. It's like there is a tear down there. The more I am up and moving around, the more irritated things become and it feels as if there is a needle poking me. Keeping things clean down there helps a lot, along with applying Aquaphor, but it doesn't last that long. Is there something else I can be doing? Do I need to see you or another doctor? Good news after four weeks the cough is finally gone, I think that was irritating things as well, since I've noticed things getting better since the coughing spells have stopped. I hope you and your daughter are on the mend. We have been thinking about you both a lot, and have kept you both in my prayers.

## 2022-08-16 ENCOUNTER — OFFICE VISIT (OUTPATIENT)
Dept: RHEUMATOLOGY | Age: 67
End: 2022-08-16
Payer: MEDICARE

## 2022-08-16 VITALS
HEIGHT: 60 IN | SYSTOLIC BLOOD PRESSURE: 120 MMHG | BODY MASS INDEX: 43 KG/M2 | DIASTOLIC BLOOD PRESSURE: 76 MMHG | WEIGHT: 219 LBS

## 2022-08-16 DIAGNOSIS — Z79.899 HIGH RISK MEDICATION USE: ICD-10-CM

## 2022-08-16 DIAGNOSIS — M15.9 GENERALIZED OSTEOARTHRITIS: ICD-10-CM

## 2022-08-16 DIAGNOSIS — G72.49 INFLAMMATORY MYOPATHY: Primary | ICD-10-CM

## 2022-08-16 LAB
A/G RATIO: 2 (ref 1.1–2.2)
ALBUMIN SERPL-MCNC: 4.6 G/DL (ref 3.4–5)
ALP BLD-CCNC: 103 U/L (ref 40–129)
ALT SERPL-CCNC: 9 U/L (ref 10–40)
ANION GAP SERPL CALCULATED.3IONS-SCNC: 14 MMOL/L (ref 3–16)
AST SERPL-CCNC: 16 U/L (ref 15–37)
BASOPHILS ABSOLUTE: 0 K/UL (ref 0–0.2)
BASOPHILS RELATIVE PERCENT: 0.6 %
BILIRUB SERPL-MCNC: 0.6 MG/DL (ref 0–1)
BUN BLDV-MCNC: 14 MG/DL (ref 7–20)
C-REACTIVE PROTEIN: 20.5 MG/L (ref 0–5.1)
CALCIUM SERPL-MCNC: 9.4 MG/DL (ref 8.3–10.6)
CHLORIDE BLD-SCNC: 100 MMOL/L (ref 99–110)
CO2: 23 MMOL/L (ref 21–32)
CREAT SERPL-MCNC: 0.6 MG/DL (ref 0.6–1.2)
EOSINOPHILS ABSOLUTE: 0.2 K/UL (ref 0–0.6)
EOSINOPHILS RELATIVE PERCENT: 2.5 %
GFR AFRICAN AMERICAN: >60
GFR NON-AFRICAN AMERICAN: >60
GLUCOSE BLD-MCNC: 108 MG/DL (ref 70–99)
HCT VFR BLD CALC: 39.6 % (ref 36–48)
HEMOGLOBIN: 13.4 G/DL (ref 12–16)
LYMPHOCYTES ABSOLUTE: 1.5 K/UL (ref 1–5.1)
LYMPHOCYTES RELATIVE PERCENT: 20 %
MCH RBC QN AUTO: 29.6 PG (ref 26–34)
MCHC RBC AUTO-ENTMCNC: 33.9 G/DL (ref 31–36)
MCV RBC AUTO: 87.3 FL (ref 80–100)
MONOCYTES ABSOLUTE: 0.5 K/UL (ref 0–1.3)
MONOCYTES RELATIVE PERCENT: 6.7 %
NEUTROPHILS ABSOLUTE: 5.2 K/UL (ref 1.7–7.7)
NEUTROPHILS RELATIVE PERCENT: 70.2 %
PDW BLD-RTO: 14.2 % (ref 12.4–15.4)
PLATELET # BLD: 323 K/UL (ref 135–450)
PMV BLD AUTO: 8.4 FL (ref 5–10.5)
POTASSIUM SERPL-SCNC: 4.4 MMOL/L (ref 3.5–5.1)
RBC # BLD: 4.54 M/UL (ref 4–5.2)
SEDIMENTATION RATE, ERYTHROCYTE: 28 MM/HR (ref 0–30)
SODIUM BLD-SCNC: 137 MMOL/L (ref 136–145)
TOTAL CK: 70 U/L (ref 26–192)
TOTAL PROTEIN: 6.9 G/DL (ref 6.4–8.2)
WBC # BLD: 7.4 K/UL (ref 4–11)

## 2022-08-16 PROCEDURE — 99214 OFFICE O/P EST MOD 30 MIN: CPT | Performed by: INTERNAL MEDICINE

## 2022-08-16 PROCEDURE — 1123F ACP DISCUSS/DSCN MKR DOCD: CPT | Performed by: INTERNAL MEDICINE

## 2022-08-16 RX ORDER — MYCOPHENOLATE MOFETIL 500 MG/1
TABLET ORAL
Qty: 360 TABLET | Refills: 0 | Status: SHIPPED | OUTPATIENT
Start: 2022-08-16 | End: 2022-10-05 | Stop reason: ALTCHOICE

## 2022-08-16 NOTE — PROGRESS NOTES
[address] [place] [address] 56. A) [phone] (F)      Dear Dr. Giovanna Hughes MD:  Please find Rheumatology assessment. Thank you for giving me the opportunity to be involved in Ector Paul's care and I look forward following Ector Ramirez along with you. If you have any questions or concerns please feel free to reach me. Note is transcribed using voice recognition software. Inadvertent computerized transcription errors may be present. Patient identification: Tena Tony: [id number],74 y.o. Sex: female     A/P    Ector Ramirez was seen today for follow-up. Diagnoses and all orders for this visit:    Inflammatory myopathy    High risk medication use    Generalized osteoarthritis    1. Inflammatory polymyositis-stable. Has been having loose bowel movements, wonder if this is related to medications. Reduce hydroxychloroquine 1 a day, if bowel symptoms do not improve-discontinue hydroxychloroquine. Continue CellCept 1 g twice daily. Safety labs and disease activity markers will be checked today. Prescription renewed. Exercises as tolerated. Monitoring for myositis-PFTs 9/20/2019- COPD changes. A PFT from 8/2/2021-   PFTs-mild restrictive defect- ? Abdominal obesity. 2. Generalized osteoarthritis-worsening symptoms because of physical deconditioning. She started exercising, hopefully this will help. Tylenol arthritis as needed. 3.Raynaud's phenomena affecting her fingertips without tissue loss-asymptomatic at this time. 4. DEXA wzyo-swqyujmvp-tm fragility fractures. Stay on calcium and vitamin D supplementation.   11/4/2021 L-spine -0.3, left hip -1, left femoral neck -2.2. FRAX score-10-year risk of major osteoporotic fracture 10% and hip fracture 1.6%. No need for pharmacologic intervention. 12/23/2019 Normal, left hip -0.4, femoral neck -1.4.       5. BMI- 42-has not put much effort to lose weight. She is having GI issues, could not focus. Discussed the importance of calorie count, calorie restriction -about 1300 calories a day and avoiding refined carbohydrates. RTC 3 months. Patient indicates understanding and agrees with the management plan. I reviewed patient's history, referral documents and electronic medical records. #######################################################################    Hwyglrxvee-nhgabm-ir for inflammatory myositis-polymyositis. Interval changes-  She has been experiencing intermittent bowel symptoms for last 4 to 5 months. Began with diarrhea, that has gotten better but still gets 2-3 loose bowel movements every morning, and sometimes throughout the day. No upper GI side effects at this time. Compliant with CellCept, and hydroxychloroquine. She has not been able to exercise since April of this year. Now, she has resumed exercises very slowly. She is still able to go up and down the stairs, do her normal daily chores however gets tired easily. She denies any focal weakness, dysphagia, diplopia. No other side effects from medications. Raynaud's phenomena stable. No osteoporotic fragility fractures. PMH, PSH- reviewed. No family history of autoimmune diseases    Current Outpatient Medications   Medication Sig Dispense Refill    lidocaine-prilocaine (EMLA) 2.5-2.5 % cream Apply topically as needed.  30 g 0    losartan (COZAAR) 100 MG tablet TAKE 1 TABLET BY MOUTH EVERY DAY 90 tablet 3    mycophenolate (CELLCEPT) 500 MG tablet Take 2 tab po BID Safety alert: Labs as recommended by prescribing MD. 360 tablet 0    hydroxychloroquine (PLAQUENIL) 200 MG tablet Take 1 tablet by mouth 2 times daily 180 tablet 3    famotidine (PEPCID) 20 MG tablet Take 1 tablet by mouth 2 times daily 180 tablet 3    mycophenolate (CELLCEPT) 500 MG tablet TAKE 2 TABLETS BY MOUTH TWICE A  tablet 0    TURMERIC PO Take by mouth      hydroxychloroquine (PLAQUENIL) 200 MG tablet TAKE 1 TABLET TWICE A  tablet 2    Omega-3 Fatty Acids (FISH OIL) 1000 MG CAPS Take 3,000 mg by mouth 3 times daily      B COMPLEX-C PO Take by mouth      Multiple Minerals-Vitamins (CALCIUM-MAGNESIUM-ZINC-D3 PO) Take by mouth      Nutritional Supplements (VITAMIN D BOOSTER PO) Take by mouth      pantoprazole (PROTONIX) 20 MG tablet Take 1 tablet by mouth every morning (before breakfast) 30 tablet 5     No current facility-administered medications for this visit. No Known Allergies    PHYSICAL EXAM:    Vitals:    /76   Ht 5' (1.524 m)   Wt 219 lb (99.3 kg)   BMI 42.77 kg/m²   General appearance/ Psychiatric: well nourished, and well groomed, normal judgement, alert, appears stated age and cooperative. MKS: Other than asymptomatic OA changes across her finger joints DIPs, PIPs, knees-no appreciable tender, swollen or inflamed joints. No focal muscle weakness noted. She is able to stand up from sitting position, has difficulty squatting and getting up from squatting position. Neck flexors have normal muscle strength. Normal EOM  Skin: No rashes.      DATA:   Lab Results   Component Value Date    WBC 5.4 05/23/2022    HGB 13.2 05/23/2022    HCT 39.3 05/23/2022    MCV 85.7 05/23/2022     05/23/2022         Chemistry        Component Value Date/Time     09/07/2021 1034    K 4.1 09/07/2021 1034    CL 98 (L) 09/07/2021 1034    CO2 20 (L) 09/07/2021 1034    BUN 13 09/07/2021 1034    CREATININE 0.5 (L) 02/15/2022 1130        Component Value Date/Time    CALCIUM 9.4 09/07/2021 1034    ALKPHOS 88 05/23/2022 0847    AST 15 05/23/2022 0847    ALT 8 (L) 05/23/2022 0847    BILITOT 0.9 05/23/2022 0847 No results found for: [facility]  Lab Results   Component Value Date    SEDRATE 20 02/15/2022     Lab Results   Component Value Date    CRP 4.4 02/15/2022     Lab Results   Component Value Date    JOSE Negative 03/02/2020    SEDRATE 20 02/15/2022     Lab Results   Component Value Date    CKTOTAL 86 05/23/2022     Lab Results   Component Value Date    TSH 4.93 (H) 08/21/2019     Lab Results   Component Value Date    VITD25 24.6 (L) 04/29/2019         Total time 33 minutes that includes the following-  Preparing to see the patient such as reviewing patients records, pre-charting, preparing the visit on the same day, performing a medically appropriate history and physical examination, counseling and educating patient about diagnosis, management plan, ordering appropriate testings, prescriptions, communicating findings to other care providers, and documenting clinical information in electronic medical record.

## 2022-08-18 LAB — ALDOLASE: 2.4 U/L (ref 1.2–7.6)

## 2022-10-05 ENCOUNTER — OFFICE VISIT (OUTPATIENT)
Dept: FAMILY MEDICINE CLINIC | Age: 67
End: 2022-10-05
Payer: MEDICARE

## 2022-10-05 VITALS
WEIGHT: 222 LBS | DIASTOLIC BLOOD PRESSURE: 82 MMHG | OXYGEN SATURATION: 99 % | SYSTOLIC BLOOD PRESSURE: 124 MMHG | HEART RATE: 87 BPM | HEIGHT: 60 IN | BODY MASS INDEX: 43.59 KG/M2 | TEMPERATURE: 97.2 F

## 2022-10-05 DIAGNOSIS — K64.8 INTERNAL HEMORRHOID: ICD-10-CM

## 2022-10-05 PROCEDURE — 90694 VACC AIIV4 NO PRSRV 0.5ML IM: CPT | Performed by: NURSE PRACTITIONER

## 2022-10-05 PROCEDURE — 99213 OFFICE O/P EST LOW 20 MIN: CPT | Performed by: NURSE PRACTITIONER

## 2022-10-05 PROCEDURE — G0008 ADMIN INFLUENZA VIRUS VAC: HCPCS | Performed by: NURSE PRACTITIONER

## 2022-10-05 PROCEDURE — 1123F ACP DISCUSS/DSCN MKR DOCD: CPT | Performed by: NURSE PRACTITIONER

## 2022-10-05 RX ORDER — HYDROCORTISONE ACETATE 25 MG/1
25 SUPPOSITORY RECTAL EVERY 12 HOURS
Qty: 14 SUPPOSITORY | Refills: 0 | Status: SHIPPED | OUTPATIENT
Start: 2022-10-05 | End: 2022-10-13 | Stop reason: SDUPTHER

## 2022-10-05 RX ORDER — HYDROXYCHLOROQUINE SULFATE 200 MG/1
200 TABLET, FILM COATED ORAL DAILY
COMMUNITY

## 2022-10-05 ASSESSMENT — ENCOUNTER SYMPTOMS
DIARRHEA: 0
BACK PAIN: 0
NAUSEA: 0
CHEST TIGHTNESS: 0
COLOR CHANGE: 0
RHINORRHEA: 0
CONSTIPATION: 0
EYE ITCHING: 0
EYE REDNESS: 0
BLOOD IN STOOL: 0
WHEEZING: 0
VOMITING: 0
SORE THROAT: 0
SHORTNESS OF BREATH: 0
COUGH: 0
ABDOMINAL PAIN: 0
SINUS PRESSURE: 0

## 2022-10-05 NOTE — ASSESSMENT & PLAN NOTE
Treatment plan with patient we will start with Anusol suppositories twice a day for 1 week and continue stool softeners. Advised symptoms get worse and do not improve can refer to colorectal surgery for additional management she has been agreeable to this plan.

## 2022-10-05 NOTE — PROGRESS NOTES
Earlene More (:  1955) is a 79 y.o. female,Established patient, here for evaluation of the following chief complaint(s): Other (Having bad hemorrhoids)      ASSESSMENT/PLAN:  1. Internal hemorrhoid  Assessment & Plan:  Treatment plan with patient we will start with Anusol suppositories twice a day for 1 week and continue stool softeners. Advised symptoms get worse and do not improve can refer to colorectal surgery for additional management she has been agreeable to this plan. No follow-ups on file. SUBJECTIVE/OBJECTIVE:  Treated in the past for frequent diarrhea, that has caused excoriation and hemorrhoid. She started a probiotic due to the frequency of diarrhea from antibiotic use. Since that time it has made a big difference and her BMs are normal. However, in the last week or so she has noticed that she is not having a complete BM. She is now going 4-6 times a day to complete the BM. She states that she feels raw now to her bottom. She notices blood if she pushes hard, not with every BM. She tried taking one stool softener in the evening. Afraid to take too many stool softeners. She has fruit and yogurt every morning to help with natural fiber. She does drink a lot water. Current Outpatient Medications   Medication Sig Dispense Refill    hydroxychloroquine (PLAQUENIL) 200 MG tablet Take 200 mg by mouth daily      hydrocortisone (ANUSOL-HC) 25 MG suppository Place 1 suppository rectally in the morning and 1 suppository in the evening.  14 suppository 0    losartan (COZAAR) 100 MG tablet TAKE 1 TABLET BY MOUTH EVERY DAY 90 tablet 3    famotidine (PEPCID) 20 MG tablet Take 1 tablet by mouth 2 times daily (Patient taking differently: Take 20 mg by mouth daily) 180 tablet 3    mycophenolate (CELLCEPT) 500 MG tablet TAKE 2 TABLETS BY MOUTH TWICE A  tablet 0    TURMERIC PO Take by mouth      Omega-3 Fatty Acids (FISH OIL) 1000 MG CAPS Take 3,000 mg by mouth 3 times daily      B COMPLEX-C PO Take by mouth      Multiple Minerals-Vitamins (CALCIUM-MAGNESIUM-ZINC-D3 PO) Take by mouth      Nutritional Supplements (VITAMIN D BOOSTER PO) Take by mouth      pantoprazole (PROTONIX) 20 MG tablet Take 1 tablet by mouth every morning (before breakfast) 30 tablet 5    lidocaine-prilocaine (EMLA) 2.5-2.5 % cream Apply topically as needed. (Patient not taking: Reported on 10/5/2022) 30 g 0     No current facility-administered medications for this visit. Review of Systems   Constitutional:  Negative for chills, fatigue and fever. HENT:  Negative for congestion, ear pain, postnasal drip, rhinorrhea, sinus pressure, sneezing and sore throat. Eyes:  Negative for redness and itching. Respiratory:  Negative for cough, chest tightness, shortness of breath and wheezing. Cardiovascular:  Negative for chest pain and palpitations. Gastrointestinal:  Negative for abdominal pain, blood in stool, constipation, diarrhea, nausea and vomiting. Endocrine: Negative for cold intolerance and heat intolerance. Genitourinary:  Negative for difficulty urinating, dysuria, flank pain, frequency, hematuria and urgency. Musculoskeletal:  Negative for arthralgias, back pain, joint swelling and myalgias. Skin:  Negative for color change, pallor, rash and wound. Allergic/Immunologic: Negative for environmental allergies and food allergies. Neurological:  Negative for dizziness, seizures, syncope, weakness, light-headedness, numbness and headaches. Hematological:  Negative for adenopathy. Does not bruise/bleed easily. Psychiatric/Behavioral:  Negative for confusion, sleep disturbance and suicidal ideas. The patient is not nervous/anxious and is not hyperactive.       Vitals:    10/05/22 1434   BP: 124/82   Site: Left Upper Arm   Position: Sitting   Cuff Size: Large Adult   Pulse: 87   Temp: 97.2 °F (36.2 °C)   SpO2: 99%   Weight: 222 lb (100.7 kg)   Height: 5' (1.524 m)       Physical Exam  Constitutional:       Appearance: Normal appearance. HENT:      Head: Normocephalic and atraumatic. Nose: Nose normal.   Eyes:      Extraocular Movements: Extraocular movements intact. Conjunctiva/sclera: Conjunctivae normal.      Pupils: Pupils are equal, round, and reactive to light. Pulmonary:      Effort: Pulmonary effort is normal.   Genitourinary:     Comments: Internal hemorrhoid no bleeding noted, normal rectal tone. Musculoskeletal:         General: Normal range of motion. Cervical back: Normal range of motion. Skin:     Coloration: Skin is not jaundiced or pale. Findings: No bruising, erythema, lesion or rash. Neurological:      Mental Status: She is alert and oriented to person, place, and time. Mental status is at baseline. Psychiatric:         Mood and Affect: Mood normal.         Behavior: Behavior normal.         Thought Content: Thought content normal.         Judgment: Judgment normal.               An electronic signature was used to authenticate this note.     --Catina Azevedo, ALFREDA - CNP

## 2022-10-13 RX ORDER — HYDROCORTISONE ACETATE 25 MG/1
25 SUPPOSITORY RECTAL EVERY 12 HOURS
Qty: 14 SUPPOSITORY | Refills: 0 | Status: SHIPPED | OUTPATIENT
Start: 2022-10-13

## 2022-11-11 NOTE — PROGRESS NOTES
[address] [place] [address] 56. (x) [phone] (F)      Dear Dr. Merline Justice, MD:  Please find Rheumatology assessment. Thank you for giving me the opportunity to be involved in Jesu Paul's care and I look forward following Jesu along with you. If you have any questions or concerns please feel free to reach me. Note is transcribed using voice recognition software. Inadvertent computerized transcription errors may be present. Patient identification: Bull Livingston: [id number],55 y.o. Sex: female     A/P    Jesu was seen today for follow-up. Diagnoses and all orders for this visit:    Inflammatory myopathy    High risk medication use  -     AST(SGOT) & ALT(SGPT); Standing  -     CBC with Auto Differential; Standing  -     C-Reactive Protein; Standing  -     Creatinine; Standing  -     Sedimentation Rate; Standing  -     CK; Standing  -     Aldolase; Standing    Generalized osteoarthritis    Chronic diarrhea    1. Inflammatory polymyositis-stable. Normal CK, no symptoms. Continue CellCept 1 g twice daily. Discontinue Plaquenil due to chronic diarrhea. Wonder if diarrhea is related to medication use. Also recommend getting colonoscopy. Monitoring for myositis-PFTs 9/20/2019- COPD changes. A PFT from 8/2/2021-   PFTs-mild restrictive defect- ? Abdominal obesity. 2. Generalized osteoarthritis-stable, acetaminophen as needed. 3.Raynaud's phenomena affecting her fingertips without tissue loss-asymptomatic at this time. 4. DEXA hgny-xhwkswovf-jo fragility fractures.   Stay on calcium and vitamin D Acids (FISH OIL) 1000 MG CAPS Take 3,000 mg by mouth 3 times daily      B COMPLEX-C PO Take by mouth      Multiple Minerals-Vitamins (CALCIUM-MAGNESIUM-ZINC-D3 PO) Take by mouth      Nutritional Supplements (VITAMIN D BOOSTER PO) Take by mouth      pantoprazole (PROTONIX) 20 MG tablet Take 1 tablet by mouth every morning (before breakfast) (Patient not taking: Reported on 11/15/2022) 30 tablet 5     No current facility-administered medications for this visit. No Known Allergies    PHYSICAL EXAM:    Vitals:    /78   Ht 5' (1.524 m)   Wt 222 lb (100.7 kg)   BMI 43.36 kg/m²   General appearance/ Psychiatric: well nourished, and well groomed, normal judgement, alert, appears stated age and cooperative. MKS: Normal ankle exam other than asymptomatic OA changes in the finger joints, knees. F ROM in all peripheral joints. No focal muscle weakness noted. She is able to stand up from sitting position, has difficulty squatting and getting up from squatting position. Neck flexors have normal muscle strength. Normal EOM  Skin: No rashes.      DATA:   Lab Results   Component Value Date    WBC 7.4 08/16/2022    HGB 13.4 08/16/2022    HCT 39.6 08/16/2022    MCV 87.3 08/16/2022     08/16/2022         Chemistry        Component Value Date/Time     08/16/2022 0951    K 4.4 08/16/2022 0951     08/16/2022 0951    CO2 23 08/16/2022 0951    BUN 14 08/16/2022 0951    CREATININE 0.6 08/16/2022 0951        Component Value Date/Time    CALCIUM 9.4 08/16/2022 0951    ALKPHOS 103 08/16/2022 0951    AST 16 08/16/2022 0951    ALT 9 (L) 08/16/2022 0951    BILITOT 0.6 08/16/2022 0951          No results found for: [facility]  Lab Results   Component Value Date    SEDRATE 28 08/16/2022     Lab Results   Component Value Date    CRP 20.5 (H) 08/16/2022     Lab Results   Component Value Date    JOSE Negative 03/02/2020    SEDRATE 28 08/16/2022     Lab Results   Component Value Date    CKTOTAL 70 08/16/2022     Lab Results   Component Value Date    TSH 4.93 (H) 08/21/2019     Lab Results   Component Value Date    VITD25 24.6 (L) 04/29/2019         Total time 35 minutes that includes the following-  Preparing to see the patient such as reviewing patients records, pre-charting, preparing the visit on the same day, performing a medically appropriate history and physical examination, counseling and educating patient about diagnosis, management plan, ordering appropriate testings, prescriptions, communicating findings to other care providers, and documenting clinical information in electronic medical record.

## 2022-11-15 ENCOUNTER — OFFICE VISIT (OUTPATIENT)
Dept: FAMILY MEDICINE CLINIC | Age: 67
End: 2022-11-15
Payer: MEDICARE

## 2022-11-15 ENCOUNTER — OFFICE VISIT (OUTPATIENT)
Dept: RHEUMATOLOGY | Age: 67
End: 2022-11-15
Payer: MEDICARE

## 2022-11-15 VITALS
DIASTOLIC BLOOD PRESSURE: 83 MMHG | BODY MASS INDEX: 43.59 KG/M2 | HEART RATE: 109 BPM | SYSTOLIC BLOOD PRESSURE: 125 MMHG | OXYGEN SATURATION: 98 % | WEIGHT: 222 LBS | TEMPERATURE: 97.1 F | HEIGHT: 60 IN

## 2022-11-15 VITALS
WEIGHT: 222 LBS | BODY MASS INDEX: 43.59 KG/M2 | SYSTOLIC BLOOD PRESSURE: 122 MMHG | HEIGHT: 60 IN | DIASTOLIC BLOOD PRESSURE: 78 MMHG

## 2022-11-15 DIAGNOSIS — K52.9 CHRONIC DIARRHEA: ICD-10-CM

## 2022-11-15 DIAGNOSIS — Z79.899 HIGH RISK MEDICATION USE: ICD-10-CM

## 2022-11-15 DIAGNOSIS — M15.9 GENERALIZED OSTEOARTHRITIS: ICD-10-CM

## 2022-11-15 DIAGNOSIS — G72.49 INFLAMMATORY MYOPATHY: Primary | ICD-10-CM

## 2022-11-15 DIAGNOSIS — K59.1 FUNCTIONAL DIARRHEA: Primary | ICD-10-CM

## 2022-11-15 DIAGNOSIS — R19.7 DIARRHEA OF PRESUMED INFECTIOUS ORIGIN: ICD-10-CM

## 2022-11-15 LAB
ALT SERPL-CCNC: 10 U/L (ref 10–40)
AST SERPL-CCNC: 16 U/L (ref 15–37)
BASOPHILS ABSOLUTE: 0 K/UL (ref 0–0.2)
BASOPHILS RELATIVE PERCENT: 0.4 %
C-REACTIVE PROTEIN: 10.6 MG/L (ref 0–5.1)
CREAT SERPL-MCNC: <0.5 MG/DL (ref 0.6–1.2)
EOSINOPHILS ABSOLUTE: 0.1 K/UL (ref 0–0.6)
EOSINOPHILS RELATIVE PERCENT: 1.1 %
GFR SERPL CREATININE-BSD FRML MDRD: >60 ML/MIN/{1.73_M2}
HCT VFR BLD CALC: 37.4 % (ref 36–48)
HEMOGLOBIN: 12.2 G/DL (ref 12–16)
LYMPHOCYTES ABSOLUTE: 1.6 K/UL (ref 1–5.1)
LYMPHOCYTES RELATIVE PERCENT: 18.3 %
MCH RBC QN AUTO: 28.4 PG (ref 26–34)
MCHC RBC AUTO-ENTMCNC: 32.7 G/DL (ref 31–36)
MCV RBC AUTO: 87.1 FL (ref 80–100)
MONOCYTES ABSOLUTE: 0.5 K/UL (ref 0–1.3)
MONOCYTES RELATIVE PERCENT: 6.3 %
NEUTROPHILS ABSOLUTE: 6.5 K/UL (ref 1.7–7.7)
NEUTROPHILS RELATIVE PERCENT: 73.9 %
PDW BLD-RTO: 13.6 % (ref 12.4–15.4)
PLATELET # BLD: 322 K/UL (ref 135–450)
PMV BLD AUTO: 8.3 FL (ref 5–10.5)
RBC # BLD: 4.29 M/UL (ref 4–5.2)
SEDIMENTATION RATE, ERYTHROCYTE: 36 MM/HR (ref 0–30)
TOTAL CK: 89 U/L (ref 26–192)
WBC # BLD: 8.7 K/UL (ref 4–11)

## 2022-11-15 PROCEDURE — 1123F ACP DISCUSS/DSCN MKR DOCD: CPT | Performed by: INTERNAL MEDICINE

## 2022-11-15 PROCEDURE — 99214 OFFICE O/P EST MOD 30 MIN: CPT | Performed by: NURSE PRACTITIONER

## 2022-11-15 PROCEDURE — 3078F DIAST BP <80 MM HG: CPT | Performed by: NURSE PRACTITIONER

## 2022-11-15 PROCEDURE — 3074F SYST BP LT 130 MM HG: CPT | Performed by: INTERNAL MEDICINE

## 2022-11-15 PROCEDURE — 3074F SYST BP LT 130 MM HG: CPT | Performed by: NURSE PRACTITIONER

## 2022-11-15 PROCEDURE — 3078F DIAST BP <80 MM HG: CPT | Performed by: INTERNAL MEDICINE

## 2022-11-15 PROCEDURE — 1123F ACP DISCUSS/DSCN MKR DOCD: CPT | Performed by: NURSE PRACTITIONER

## 2022-11-15 PROCEDURE — 99214 OFFICE O/P EST MOD 30 MIN: CPT | Performed by: INTERNAL MEDICINE

## 2022-11-15 ASSESSMENT — ENCOUNTER SYMPTOMS
VOMITING: 0
COUGH: 0
BACK PAIN: 0
ANAL BLEEDING: 0
SHORTNESS OF BREATH: 0
EYE REDNESS: 0
COLOR CHANGE: 0
SORE THROAT: 0
SINUS PRESSURE: 0
ABDOMINAL PAIN: 0
BLOOD IN STOOL: 0
CONSTIPATION: 0
EYE ITCHING: 0
CHEST TIGHTNESS: 0
RHINORRHEA: 0
RECTAL PAIN: 1
NAUSEA: 0
DIARRHEA: 1
WHEEZING: 0

## 2022-11-15 NOTE — PROGRESS NOTES
Sea aBrajas (:  1955) is a 79 y.o. female,Established patient, here for evaluation of the following chief complaint(s): Other (Over excessive diarrhea)      ASSESSMENT/PLAN:  1. Functional diarrhea  Assessment & Plan:  Irina Armenta continues to have chronic diarrhea without known cause. Will order stool studies today to rule out any underlying bacterial or viral cause. Based on these results, will come up with treatment plan to reduce diarrheal episodes. Plan to get colonoscopy at earliest convenience. 2. Diarrhea of presumed infectious origin  -     C DIFF TOXIN/ANTIGEN; Future  -     GIARDIA ANTIGEN; Future  -     ROTAVIRUS ANTIGEN, STOOL; Future  -     OVA & PARASITE ID/COUNT #1; Future  -     GI Bacterial Pathogens By PCR; Future  -     H. PYLORI ANTIGEN, STOOL; Future  -     AFL - Mylene Bailon MD, Gastroenterology, [facility]    No follow-ups on file. SUBJECTIVE/OBJECTIVE:  Patient presents today with chronic, loose, brown stool for over 6 months. She states it comes in waves, she ranges from 3-8 stools a day. She also feels pressure in rectum which is uncomfortable. Denies blood in stool, n/v, fevers, chills. She is eating high fiber, yogurt, probiotics. Drinking plenty of water. She has not taken any medications to reduce bowel movements. She has not had a colonoscopy, but would like referral She is seen for rheumatology for polymyositis and raynaud's. She presents for ongoing concerns.     Current Outpatient Medications   Medication Sig Dispense Refill    hydroxychloroquine (PLAQUENIL) 200 MG tablet Take 200 mg by mouth daily      losartan (COZAAR) 100 MG tablet TAKE 1 TABLET BY MOUTH EVERY DAY 90 tablet 3    famotidine (PEPCID) 20 MG tablet Take 1 tablet by mouth 2 times daily (Patient taking differently: Take 20 mg by mouth daily) 180 tablet 3    mycophenolate (CELLCEPT) 500 MG tablet TAKE 2 TABLETS BY MOUTH TWICE A  tablet 0    TURMERIC PO Take by mouth      Omega-3 Fatty Acids (FISH OIL) 1000 MG CAPS Take 3,000 mg by mouth 3 times daily      B COMPLEX-C PO Take by mouth      Multiple Minerals-Vitamins (CALCIUM-MAGNESIUM-ZINC-D3 PO) Take by mouth      Nutritional Supplements (VITAMIN D BOOSTER PO) Take by mouth      lidocaine-prilocaine (EMLA) 2.5-2.5 % cream Apply topically as needed. (Patient not taking: Reported on 11/15/2022) 30 g 0     No current facility-administered medications for this visit. Review of Systems   Constitutional:  Negative for chills, fatigue and fever. HENT:  Negative for congestion, ear pain, postnasal drip, rhinorrhea, sinus pressure, sneezing and sore throat. Eyes:  Negative for redness and itching. Respiratory:  Negative for cough, chest tightness, shortness of breath and wheezing. Cardiovascular:  Negative for chest pain and palpitations. Gastrointestinal:  Positive for diarrhea and rectal pain. Negative for abdominal pain, anal bleeding, blood in stool, constipation, nausea and vomiting. Endocrine: Negative for cold intolerance and heat intolerance. Genitourinary:  Negative for difficulty urinating, dysuria, flank pain, frequency, hematuria and urgency. Musculoskeletal:  Negative for arthralgias, back pain, joint swelling and myalgias. Skin:  Negative for color change, pallor, rash and wound. Allergic/Immunologic: Negative for environmental allergies and food allergies. Neurological:  Negative for dizziness, seizures, syncope, weakness, light-headedness, numbness and headaches. Hematological:  Negative for adenopathy. Does not bruise/bleed easily. Psychiatric/Behavioral:  Negative for confusion, sleep disturbance and suicidal ideas. The patient is not nervous/anxious and is not hyperactive.       Vitals:    11/15/22 1247   BP: 125/83   Site: Left Upper Arm   Position: Sitting   Cuff Size: Large Adult   Pulse: (!) 109   Temp: 97.1 °F (36.2 °C)   SpO2: 98%   Weight: 222 lb (100.7 kg)   Height: 5' (1.524 m) Physical Exam  Vitals reviewed. Constitutional:       Appearance: Normal appearance. She is well-developed. HENT:      Head: Normocephalic and atraumatic. Right Ear: Hearing normal.      Left Ear: Hearing normal.      Nose: No mucosal edema. Right Sinus: No maxillary sinus tenderness or frontal sinus tenderness. Left Sinus: No maxillary sinus tenderness or frontal sinus tenderness. Mouth/Throat: Tonsils: No tonsillar abscesses. Eyes:      Extraocular Movements: Extraocular movements intact. Pupils: Pupils are equal, round, and reactive to light. Cardiovascular:      Rate and Rhythm: Normal rate and regular rhythm. Pulses: Normal pulses. Heart sounds: Normal heart sounds. Pulmonary:      Effort: Pulmonary effort is normal.      Breath sounds: Normal breath sounds. Lymphadenopathy:      Head:      Right side of head: No submental, submandibular, tonsillar, preauricular, posterior auricular or occipital adenopathy. Left side of head: No submental, submandibular, tonsillar, preauricular, posterior auricular or occipital adenopathy. Skin:     General: Skin is warm and dry. Neurological:      General: No focal deficit present. Mental Status: She is alert and oriented to person, place, and time. Psychiatric:         Mood and Affect: Mood normal.         Behavior: Behavior normal.               An electronic signature was used to authenticate this note.     --ALFREDA Cartagena - CNP

## 2022-11-15 NOTE — ASSESSMENT & PLAN NOTE
Jame Smith continues to have chronic diarrhea without known cause. Will order stool studies today to rule out any underlying bacterial or viral cause. Based on these results, will come up with treatment plan to reduce diarrheal episodes. Plan to get colonoscopy at earliest convenience.

## 2022-11-16 DIAGNOSIS — R19.7 DIARRHEA OF PRESUMED INFECTIOUS ORIGIN: ICD-10-CM

## 2022-11-17 LAB
ALDOLASE: 2.8 U/L (ref 1.2–7.6)
C DIFF TOXIN/ANTIGEN: NORMAL
GI BACTERIAL PATHOGENS BY PCR: NORMAL

## 2022-11-18 LAB — ROTAVIRUS ANTIGEN: NEGATIVE

## 2022-11-19 LAB — H PYLORI ANTIGEN STOOL: NEGATIVE

## 2022-11-20 LAB — INTERPRETATION: NEGATIVE

## 2022-11-21 ENCOUNTER — HOSPITAL ENCOUNTER (OUTPATIENT)
Dept: MAMMOGRAPHY | Age: 67
Discharge: HOME | End: 2022-11-21
Payer: MEDICARE

## 2022-11-21 VITALS — BODY MASS INDEX: 44.37 KG/M2 | HEIGHT: 60 IN | WEIGHT: 226 LBS

## 2022-11-21 DIAGNOSIS — Z12.31 VISIT FOR SCREENING MAMMOGRAM: ICD-10-CM

## 2022-11-21 PROCEDURE — 77067 SCR MAMMO BI INCL CAD: CPT

## 2022-11-28 ENCOUNTER — PATIENT MESSAGE (OUTPATIENT)
Dept: RHEUMATOLOGY | Age: 67
End: 2022-11-28

## 2022-11-28 RX ORDER — MYCOPHENOLATE MOFETIL 500 MG/1
TABLET ORAL
Qty: 360 TABLET | Refills: 0 | Status: SHIPPED | OUTPATIENT
Start: 2022-11-28

## 2022-11-28 NOTE — TELEPHONE ENCOUNTER
From: Bonnie Storey  To: Dr. Sunitha Curry  Sent: 11/28/2022 1:50 PM EST  Subject: refill    Just opened my last bottle of Mycophenolate 500MG. Can you please call [facility] on [address] [phone] for a refill?  Thanks

## 2023-01-17 DIAGNOSIS — Z79.899 HIGH RISK MEDICATION USE: ICD-10-CM

## 2023-01-17 LAB
ALT SERPL-CCNC: 9 U/L (ref 10–40)
AST SERPL-CCNC: 17 U/L (ref 15–37)
BASOPHILS ABSOLUTE: 0 K/UL (ref 0–0.2)
BASOPHILS RELATIVE PERCENT: 0.3 %
C-REACTIVE PROTEIN: 3.7 MG/L (ref 0–5.1)
CREAT SERPL-MCNC: 0.6 MG/DL (ref 0.6–1.2)
EOSINOPHILS ABSOLUTE: 0.2 K/UL (ref 0–0.6)
EOSINOPHILS RELATIVE PERCENT: 2.3 %
GFR SERPL CREATININE-BSD FRML MDRD: >60 ML/MIN/{1.73_M2}
HCT VFR BLD CALC: 43.1 % (ref 36–48)
HEMOGLOBIN: 13.9 G/DL (ref 12–16)
LYMPHOCYTES ABSOLUTE: 2 K/UL (ref 1–5.1)
LYMPHOCYTES RELATIVE PERCENT: 26.8 %
MCH RBC QN AUTO: 28.1 PG (ref 26–34)
MCHC RBC AUTO-ENTMCNC: 32.2 G/DL (ref 31–36)
MCV RBC AUTO: 87.4 FL (ref 80–100)
MONOCYTES ABSOLUTE: 0.6 K/UL (ref 0–1.3)
MONOCYTES RELATIVE PERCENT: 7.7 %
NEUTROPHILS ABSOLUTE: 4.6 K/UL (ref 1.7–7.7)
NEUTROPHILS RELATIVE PERCENT: 62.9 %
PDW BLD-RTO: 14.6 % (ref 12.4–15.4)
PLATELET # BLD: 295 K/UL (ref 135–450)
PMV BLD AUTO: 8.8 FL (ref 5–10.5)
RBC # BLD: 4.93 M/UL (ref 4–5.2)
SEDIMENTATION RATE, ERYTHROCYTE: 21 MM/HR (ref 0–30)
TOTAL CK: 64 U/L (ref 26–192)
WBC # BLD: 7.3 K/UL (ref 4–11)

## 2023-01-19 LAB — ALDOLASE: 2.9 U/L (ref 1.2–7.6)

## 2023-02-14 ENCOUNTER — OFFICE VISIT (OUTPATIENT)
Dept: RHEUMATOLOGY | Age: 68
End: 2023-02-14
Payer: MEDICARE

## 2023-02-14 VITALS
DIASTOLIC BLOOD PRESSURE: 82 MMHG | BODY MASS INDEX: 44.37 KG/M2 | SYSTOLIC BLOOD PRESSURE: 120 MMHG | WEIGHT: 226 LBS | HEIGHT: 60 IN

## 2023-02-14 DIAGNOSIS — G72.49 INFLAMMATORY MYOPATHY: Primary | ICD-10-CM

## 2023-02-14 DIAGNOSIS — Z79.899 HIGH RISK MEDICATION USE: ICD-10-CM

## 2023-02-14 DIAGNOSIS — M15.9 GENERALIZED OSTEOARTHRITIS: ICD-10-CM

## 2023-02-14 PROCEDURE — 3079F DIAST BP 80-89 MM HG: CPT | Performed by: INTERNAL MEDICINE

## 2023-02-14 PROCEDURE — 99214 OFFICE O/P EST MOD 30 MIN: CPT | Performed by: INTERNAL MEDICINE

## 2023-02-14 PROCEDURE — 1123F ACP DISCUSS/DSCN MKR DOCD: CPT | Performed by: INTERNAL MEDICINE

## 2023-02-14 PROCEDURE — 3074F SYST BP LT 130 MM HG: CPT | Performed by: INTERNAL MEDICINE

## 2023-02-14 RX ORDER — MYCOPHENOLATE MOFETIL 500 MG/1
TABLET ORAL
Qty: 360 TABLET | Refills: 1 | Status: SHIPPED | OUTPATIENT
Start: 2023-02-14

## 2023-02-14 NOTE — PROGRESS NOTES
[address] [place] [address] 56. (Y) [phone] (F)      Dear Dr. Tyra Juan MD:  Please find Rheumatology assessment. Thank you for giving me the opportunity to be involved in Jesu Paul's care and I look forward following Jesu along with you. If you have any questions or concerns please feel free to reach me. Note is transcribed using voice recognition software. Inadvertent computerized transcription errors may be present. Patient identification: Hunter Fernández: [id number],40 y.o. Sex: female     A/P    Jesu was seen today for follow-up. Diagnoses and all orders for this visit:    Inflammatory myopathy    High risk medication use    Generalized osteoarthritis    Other orders  -     mycophenolate (CELLCEPT) 500 MG tablet; TAKE 2 TABLETS BY MOUTH TWICE A DAY    1. Inflammatory polymyositis-stable. Normal CK, no symptoms. Labs within normal limits. Continue 1 g of CellCept twice daily. Primary-hydroxychloroquine-diarrhea    Monitoring for myositis-PFTs 9/20/2019- COPD changes. A PFT from 8/2/2021-   PFTs-mild restrictive defect- ? Abdominal obesity. 2. Generalized osteoarthritis-stable, acetaminophen as needed. 3.Raynaud's phenomena affecting her fingertips without tissue loss-asymptomatic now. 4. DEXA kcop-hpksuzicc-xy fragility fractures. Continue calcium and vitamin D supplementation. 11/4/2021 L-spine -0.3, left hip -1, left femoral neck -2.2. FRAX score-10-year risk of major osteoporotic fracture 10% and hip fracture 1.6%. No need for pharmacologic intervention.     12/23/2019 Normal, left hip -0.4, femoral neck -1.4. RTC 3 months. Patient indicates understanding and agrees with the management plan. I reviewed patient's history, referral documents and electronic medical records. #######################################################################    Dtvhivpsrb-oscpxt-uk for inflammatory myositis-polymyositis. Interval changes-  She is doing well, denies any pain, swelling, stiffness in the joints or muscle weakness. She does have difficulty in getting from floor or lower lying tool, otherwise normal ADLs and recreational activities. No flares or new symptoms, other than intercurrent arthralgias from osteoarthritis. She denies any focal weakness, dysphagia, diplopia. No other side effects from medications. No osteoporotic fragility fractures. PMH, PSH- reviewed. No family history of autoimmune diseases    Current Outpatient Medications   Medication Sig Dispense Refill    mycophenolate (CELLCEPT) 500 MG tablet TAKE 2 TABLETS BY MOUTH TWICE A  tablet 1    hydroxychloroquine (PLAQUENIL) 200 MG tablet Take 200 mg by mouth daily      lidocaine-prilocaine (EMLA) 2.5-2.5 % cream Apply topically as needed. 30 g 0    losartan (COZAAR) 100 MG tablet TAKE 1 TABLET BY MOUTH EVERY DAY 90 tablet 3    famotidine (PEPCID) 20 MG tablet Take 1 tablet by mouth 2 times daily (Patient taking differently: Take 20 mg by mouth daily) 180 tablet 3    TURMERIC PO Take by mouth      Omega-3 Fatty Acids (FISH OIL) 1000 MG CAPS Take 3,000 mg by mouth 3 times daily      B COMPLEX-C PO Take by mouth      Multiple Minerals-Vitamins (CALCIUM-MAGNESIUM-ZINC-D3 PO) Take by mouth      Nutritional Supplements (VITAMIN D BOOSTER PO) Take by mouth       No current facility-administered medications for this visit.      No Known Allergies    PHYSICAL EXAM:    Vitals:    /82   Ht 5' (1.524 m)   Wt 226 lb (102.5 kg)   BMI 44.14 kg/m²   General appearance/ Psychiatric: well nourished, and well groomed, normal judgement, alert, appears stated age and cooperative. MKS: Exam unchanged since last visit as below-  Normal MK exam other than asymptomatic OA changes in the finger joints, knees. F ROM in all peripheral joints. No focal muscle weakness noted. She is able to stand up from sitting position, has difficulty squatting and getting up from squatting position. Neck flexors have normal muscle strength. Normal EOM  Skin: No rashes. DATA:   Lab Results   Component Value Date    WBC 7.3 01/17/2023    HGB 13.9 01/17/2023    HCT 43.1 01/17/2023    MCV 87.4 01/17/2023     01/17/2023         Chemistry        Component Value Date/Time     08/16/2022 0951    K 4.4 08/16/2022 0951     08/16/2022 0951    CO2 23 08/16/2022 0951    BUN 14 08/16/2022 0951    CREATININE 0.6 01/17/2023 0856        Component Value Date/Time    CALCIUM 9.4 08/16/2022 0951    ALKPHOS 103 08/16/2022 0951    AST 17 01/17/2023 0856    ALT 9 (L) 01/17/2023 0856    BILITOT 0.6 08/16/2022 0951          No results found for: [facility]  Lab Results   Component Value Date    SEDRATE 21 01/17/2023     Lab Results   Component Value Date    CRP 3.7 01/17/2023     Lab Results   Component Value Date    JOSE Negative 03/02/2020    SEDRATE 21 01/17/2023     Lab Results   Component Value Date    CKTOTAL 64 01/17/2023     Lab Results   Component Value Date    TSH 4.93 (H) 08/21/2019     Lab Results   Component Value Date    VITD25 24.6 (L) 04/29/2019         Total time 32 minutes that includes the following-  Preparing to see the patient such as reviewing patients records, pre-charting, preparing the visit on the same day, performing a medically appropriate history and physical examination, counseling and educating patient about diagnosis, management plan, ordering appropriate testings, prescriptions, communicating findings to other care providers, and documenting clinical information in electronic medical record.

## 2023-04-07 ENCOUNTER — OFFICE VISIT (OUTPATIENT)
Dept: FAMILY MEDICINE CLINIC | Age: 68
End: 2023-04-07
Payer: MEDICARE

## 2023-04-07 VITALS
WEIGHT: 219.8 LBS | BODY MASS INDEX: 43.15 KG/M2 | DIASTOLIC BLOOD PRESSURE: 90 MMHG | HEIGHT: 60 IN | OXYGEN SATURATION: 98 % | SYSTOLIC BLOOD PRESSURE: 138 MMHG | HEART RATE: 118 BPM

## 2023-04-07 DIAGNOSIS — M33.20 POLYMYOSITIS (HCC): ICD-10-CM

## 2023-04-07 DIAGNOSIS — I10 BENIGN ESSENTIAL HTN: ICD-10-CM

## 2023-04-07 DIAGNOSIS — E55.9 VITAMIN D DEFICIENCY: ICD-10-CM

## 2023-04-07 DIAGNOSIS — Z00.00 WELL ADULT EXAM: ICD-10-CM

## 2023-04-07 DIAGNOSIS — Z80.8 FAMILY HISTORY OF MELANOMA: ICD-10-CM

## 2023-04-07 DIAGNOSIS — K21.00 GASTROESOPHAGEAL REFLUX DISEASE WITH ESOPHAGITIS WITHOUT HEMORRHAGE: ICD-10-CM

## 2023-04-07 DIAGNOSIS — Z00.00 MEDICARE ANNUAL WELLNESS VISIT, SUBSEQUENT: Primary | ICD-10-CM

## 2023-04-07 PROCEDURE — 3080F DIAST BP >= 90 MM HG: CPT | Performed by: FAMILY MEDICINE

## 2023-04-07 PROCEDURE — 3075F SYST BP GE 130 - 139MM HG: CPT | Performed by: FAMILY MEDICINE

## 2023-04-07 PROCEDURE — G0439 PPPS, SUBSEQ VISIT: HCPCS | Performed by: FAMILY MEDICINE

## 2023-04-07 PROCEDURE — 1123F ACP DISCUSS/DSCN MKR DOCD: CPT | Performed by: FAMILY MEDICINE

## 2023-04-07 SDOH — ECONOMIC STABILITY: FOOD INSECURITY: WITHIN THE PAST 12 MONTHS, YOU WORRIED THAT YOUR FOOD WOULD RUN OUT BEFORE YOU GOT MONEY TO BUY MORE.: NEVER TRUE

## 2023-04-07 SDOH — ECONOMIC STABILITY: HOUSING INSECURITY
IN THE LAST 12 MONTHS, WAS THERE A TIME WHEN YOU DID NOT HAVE A STEADY PLACE TO SLEEP OR SLEPT IN A SHELTER (INCLUDING NOW)?: NO

## 2023-04-07 SDOH — ECONOMIC STABILITY: FOOD INSECURITY: WITHIN THE PAST 12 MONTHS, THE FOOD YOU BOUGHT JUST DIDN'T LAST AND YOU DIDN'T HAVE MONEY TO GET MORE.: NEVER TRUE

## 2023-04-07 SDOH — ECONOMIC STABILITY: INCOME INSECURITY: HOW HARD IS IT FOR YOU TO PAY FOR THE VERY BASICS LIKE FOOD, HOUSING, MEDICAL CARE, AND HEATING?: NOT HARD AT ALL

## 2023-04-07 ASSESSMENT — PATIENT HEALTH QUESTIONNAIRE - PHQ9
SUM OF ALL RESPONSES TO PHQ QUESTIONS 1-9: 2
2. FEELING DOWN, DEPRESSED OR HOPELESS: 1
1. LITTLE INTEREST OR PLEASURE IN DOING THINGS: 1
SUM OF ALL RESPONSES TO PHQ9 QUESTIONS 1 & 2: 2
SUM OF ALL RESPONSES TO PHQ QUESTIONS 1-9: 2

## 2023-04-07 ASSESSMENT — LIFESTYLE VARIABLES
HOW OFTEN DO YOU HAVE A DRINK CONTAINING ALCOHOL: 2-4 TIMES A MONTH
HOW MANY STANDARD DRINKS CONTAINING ALCOHOL DO YOU HAVE ON A TYPICAL DAY: 3 OR 4

## 2023-04-07 NOTE — PATIENT INSTRUCTIONS
changes become habit, add a few more changes. If you don't think you're ready to make changes right now, try to pick a date in the future. Make an appointment to see your doctor to discuss whether the time is right for you to start a plan. Follow-up care is a key part of your treatment and safety. Be sure to make and go to all appointments, and call your doctor if you are having problems. It's also a good idea to know your test results and keep a list of the medicines you take. How can you care for yourself at home? Set realistic goals. Many people expect to lose much more weight than is likely. A weight loss of 5% to 10% of your body weight may be enough to improve your health. Get family and friends involved to provide support. Talk to them about why you are trying to lose weight, and ask them to help. They can help by participating in exercise and having meals with you, even if they may be eating something different. Find what works best for you. If you do not have time or do not like to cook, a program that offers meal replacement bars or shakes may be better for you. Or if you like to prepare meals, finding a plan that includes daily menus and recipes may be best.  Ask your doctor about other health professionals who can help you achieve your weight loss goals. A dietitian can help you make healthy changes in your diet. An exercise specialist or  can help you develop a safe and effective exercise program.  A counselor or psychiatrist can help you cope with issues such as depression, anxiety, or family problems that can make it hard to focus on weight loss. Consider joining a support group for people who are trying to lose weight. Your doctor can suggest groups in your area. Where can you learn more? Go to http://www.woods.com/ and enter U357 to learn more about \"Starting a Weight Loss Plan: Care Instructions. \"  Current as of:  May 9, 2022               Content Version:

## 2023-04-07 NOTE — PROGRESS NOTES
[place] Yes Merline Justice, MD   famotidine (PEPCID) 20 MG tablet Take 1 tablet by mouth 2 times daily  Patient taking differently: Take 1 tablet by mouth daily Yes Merline Justice, MD   Omega-3 Fatty Acids (FISH OIL) 1000 MG CAPS Take 3 capsules by mouth 3 times daily Yes Historical Provider, MD   B COMPLEX-C PO Take by mouth Yes Historical Provider, MD   Multiple Minerals-Vitamins (CALCIUM-MAGNESIUM-ZINC-D3 PO) Take by mouth Yes Historical Provider, MD   Nutritional Supplements (VITAMIN D BOOSTER PO) Take by mouth Yes Historical Provider, MD   lidocaine-prilocaine (EMLA) 2.5-2.5 % cream Apply topically as needed.   Patient not taking: Reported on 4/7/2023  Lyly Sosa MD       [facility] (Including outside providers/suppliers regularly involved in providing care):   Patient Care Team:  Merline Justice, MD as PCP - General (Family Medicine)  Merline Justice, MD as PCP - Empaneled Provider     Reviewed and updated this visit:  Tobacco  Allergies  Meds  Problems  Med Hx  Surg Hx  Soc Hx  Fam Hx               Merline Justice, MD

## 2023-04-22 RX ORDER — FAMOTIDINE 20 MG/1
TABLET, FILM COATED ORAL
Qty: 180 TABLET | Refills: 3 | Status: SHIPPED | OUTPATIENT
Start: 2023-04-22

## 2023-05-07 PROBLEM — Z00.00 WELL ADULT EXAM: Status: RESOLVED | Noted: 2019-03-07 | Resolved: 2023-05-07

## 2023-06-21 RX ORDER — LOSARTAN POTASSIUM 100 MG/1
TABLET ORAL
Qty: 90 TABLET | Refills: 3 | Status: SHIPPED | OUTPATIENT
Start: 2023-06-21

## 2023-08-29 DIAGNOSIS — R30.0 DYSURIA: Primary | ICD-10-CM

## 2023-08-29 LAB
BACTERIA URNS QL MICRO: ABNORMAL /HPF
BILIRUB UR QL STRIP.AUTO: NEGATIVE
CLARITY UR: ABNORMAL
COLOR UR: YELLOW
EPI CELLS #/AREA URNS AUTO: 12 /HPF (ref 0–5)
GLUCOSE UR STRIP.AUTO-MCNC: NEGATIVE MG/DL
HGB UR QL STRIP.AUTO: NEGATIVE
HYALINE CASTS #/AREA URNS AUTO: 1 /LPF (ref 0–8)
KETONES UR STRIP.AUTO-MCNC: NEGATIVE MG/DL
LEUKOCYTE ESTERASE UR QL STRIP.AUTO: ABNORMAL
NITRITE UR QL STRIP.AUTO: NEGATIVE
PH UR STRIP.AUTO: 7 [PH] (ref 5–8)
PROT UR STRIP.AUTO-MCNC: NEGATIVE MG/DL
RBC CLUMPS #/AREA URNS AUTO: 4 /HPF (ref 0–4)
SP GR UR STRIP.AUTO: 1.01 (ref 1–1.03)
UA COMPLETE W REFLEX CULTURE PNL UR: ABNORMAL
UA DIPSTICK W REFLEX MICRO PNL UR: YES
URN SPEC COLLECT METH UR: ABNORMAL
UROBILINOGEN UR STRIP-ACNC: 0.2 E.U./DL
WBC #/AREA URNS AUTO: 2 /HPF (ref 0–5)

## 2024-01-13 ENCOUNTER — HOSPITAL ENCOUNTER (EMERGENCY)
Age: 69
Discharge: HOME | End: 2024-01-13
Attending: EMERGENCY MEDICINE
Payer: MEDICARE

## 2024-01-13 VITALS
BODY MASS INDEX: 40.93 KG/M2 | HEIGHT: 61 IN | WEIGHT: 216.8 LBS | DIASTOLIC BLOOD PRESSURE: 141 MMHG | OXYGEN SATURATION: 96 % | RESPIRATION RATE: 22 BRPM | TEMPERATURE: 98 F | HEART RATE: 67 BPM | SYSTOLIC BLOOD PRESSURE: 190 MMHG

## 2024-01-13 DIAGNOSIS — R04.0 EPISTAXIS: Primary | ICD-10-CM

## 2024-01-13 PROCEDURE — 6370000000 HC RX 637 (ALT 250 FOR IP): Performed by: EMERGENCY MEDICINE

## 2024-01-13 PROCEDURE — 99282 EMERGENCY DEPT VISIT SF MDM: CPT

## 2024-01-13 RX ORDER — OXYMETAZOLINE HYDROCHLORIDE 0.05 G/100ML
2 SPRAY NASAL ONCE
Status: COMPLETED | OUTPATIENT
Start: 2024-01-13 | End: 2024-01-13

## 2024-01-13 RX ADMIN — OXYMETAZOLINE HYDROCHLORIDE 2 SPRAY: 0.05 SPRAY NASAL at 00:47

## 2024-01-13 ASSESSMENT — PAIN - FUNCTIONAL ASSESSMENT: PAIN_FUNCTIONAL_ASSESSMENT: NONE - DENIES PAIN

## 2024-01-13 ASSESSMENT — LIFESTYLE VARIABLES
HOW OFTEN DO YOU HAVE A DRINK CONTAINING ALCOHOL: 2-4 TIMES A MONTH
HOW MANY STANDARD DRINKS CONTAINING ALCOHOL DO YOU HAVE ON A TYPICAL DAY: 1 OR 2

## 2024-01-13 NOTE — ED PROVIDER NOTES
ED Attending Attestation Note     Date of evaluation: 1/13/2024    This patient was seen by the resident.  I have seen and examined the patient, agree with the workup, evaluation, management and diagnosis. The care plan has been discussed.  My assessment reveals a 68-year-old female with past medical history of hypertension, GERD, Raynaud's who presents to the emergency department with epistaxis that started about 1 hour prior to arrival in the emergency department.  Reports this is her third episode over the past week.  She was able to gain control of the previous episodes with direct pressure, but this 1 was continuous and so she has sought evaluation in the emergency department.  She is not on any anticoagulants.  She denies any trauma to her nose.  States that the blood was coming from the right nare.    On exam the patient is hemodynamically stable, in no acute distress.  She had a nasal clamp placed shortly after arrival, when this was removed we noticed some oozing bright red blood from the right nare with an apparent septal source, no blood noted in the posterior oropharynx.    Miles Alford MD  [facility]  Emergency Medicine     Miles Alford MD  01/13/24 0153

## 2024-01-13 NOTE — DISCHARGE INSTRUCTIONS
You are seen today in the emergency department for nosebleed.  We were able to get your nosebleed to stop with direct pressure and a medication called Afrin.  We believe that you are safe for discharge at this time I would recommend you follow-up with your PCP for elevated blood pressure readings while you are in the emergency department or return to the ED if you have any headaches, dizziness or lightheadedness, chest pain or shortness of breath.  Should your nose rebleed we would recommend applying direct pressure and return to the emergency department if that does not stop the bleeding after 15 to 20 minutes.

## 2024-01-13 NOTE — ED PROVIDER NOTES
THE [facility]  EMERGENCY DEPARTMENT ENCOUNTER          EM RESIDENT NOTE     Date of evaluation: 1/13/2024    Chief Complaint     Epistaxis (Pt nose started bleeding apporx one hour ago. Denies trauma or blood thinning medication. Hx of HTN)    History of Present Illness     Elo Paul is a 68 y.o. female with past medical history of HTN, GERD, and Raynaud's disease who presents emergency department for evaluation of epistaxis.  Patient reports that this is her third episode of epistaxis this week however prior episodes of fall improved with direct pressure and not required visits to the emergency department.  She reports those episodes happened Monday and then again on Tuesday. Patient reports earlier today she had a small episode of epistaxis that improved however approximately 1 hour prior to arrival she leaned over and had onset of bleeding from her right nare.  She reports that she is unable to get it to stop despite direct pressure at home.  Denies any digital trauma or placing any other things inside of her nare.  Denies any feeling of blood dripping down the back of her throat.  Reports she has been blowing out large clots at home.  Denies any trauma or anticoagulation.  Reports compliance with her antihypertensives.    Past Medical, Surgical, Family, and Social History     She has a past medical history of Benign essential HTN, GERD (gastroesophageal reflux disease), Polymyositis (HCC), and Raynaud's disease without gangrene.  She has a past surgical history that includes Breast biopsy (1990) and Muscle biopsy (Left, 10/8/2019).  Her family history includes Breast Cancer (age of onset: 48) in her mother.  She reports that she quit smoking about 18 years ago. Her smoking use included cigarettes. She started smoking about 33 years ago. She has a 30.0 pack-year smoking history. She has never used smokeless tobacco. She reports current alcohol use. She reports that she does not use

## 2024-01-15 ENCOUNTER — OFFICE VISIT (OUTPATIENT)
Dept: FAMILY MEDICINE CLINIC | Age: 69
End: 2024-01-15
Payer: MEDICARE

## 2024-01-15 VITALS
HEART RATE: 81 BPM | OXYGEN SATURATION: 100 % | DIASTOLIC BLOOD PRESSURE: 86 MMHG | SYSTOLIC BLOOD PRESSURE: 136 MMHG | WEIGHT: 225 LBS | BODY MASS INDEX: 42.51 KG/M2

## 2024-01-15 DIAGNOSIS — R04.0 EPISTAXIS: ICD-10-CM

## 2024-01-15 DIAGNOSIS — R00.2 PALPITATIONS: ICD-10-CM

## 2024-01-15 DIAGNOSIS — I10 BENIGN ESSENTIAL HTN: Primary | ICD-10-CM

## 2024-01-15 PROCEDURE — 3075F SYST BP GE 130 - 139MM HG: CPT | Performed by: STUDENT IN AN ORGANIZED HEALTH CARE EDUCATION/TRAINING PROGRAM

## 2024-01-15 PROCEDURE — 99214 OFFICE O/P EST MOD 30 MIN: CPT | Performed by: STUDENT IN AN ORGANIZED HEALTH CARE EDUCATION/TRAINING PROGRAM

## 2024-01-15 PROCEDURE — 1123F ACP DISCUSS/DSCN MKR DOCD: CPT | Performed by: STUDENT IN AN ORGANIZED HEALTH CARE EDUCATION/TRAINING PROGRAM

## 2024-01-15 PROCEDURE — 3079F DIAST BP 80-89 MM HG: CPT | Performed by: STUDENT IN AN ORGANIZED HEALTH CARE EDUCATION/TRAINING PROGRAM

## 2024-01-15 ASSESSMENT — PATIENT HEALTH QUESTIONNAIRE - PHQ9
SUM OF ALL RESPONSES TO PHQ QUESTIONS 1-9: 0
SUM OF ALL RESPONSES TO PHQ9 QUESTIONS 1 & 2: 0
1. LITTLE INTEREST OR PLEASURE IN DOING THINGS: 0
SUM OF ALL RESPONSES TO PHQ QUESTIONS 1-9: 0
SUM OF ALL RESPONSES TO PHQ QUESTIONS 1-9: 0
2. FEELING DOWN, DEPRESSED OR HOPELESS: 0
SUM OF ALL RESPONSES TO PHQ QUESTIONS 1-9: 0

## 2024-01-18 ENCOUNTER — PATIENT MESSAGE (OUTPATIENT)
Dept: FAMILY MEDICINE CLINIC | Age: 69
End: 2024-01-18

## 2024-01-18 DIAGNOSIS — R04.0 EPISTAXIS: Primary | ICD-10-CM

## 2024-01-18 NOTE — TELEPHONE ENCOUNTER
From: Elo Paul  To: Dr. Bharath Yadav  Sent: 1/18/2024 12:53 PM EST  Subject: bloody nose    Friday I was in the ER because my nose wouldn't stop bleeding. Bled for about 1 1/2 hours. They thought it might be related to high blood pressure, but did not give me anything at the time. Prior to Friday I had a bloody nose on Tuesday, and another Thursday, but was able to get those to stop. I made an appointment Monday with another doctor in your office to check things out. Dr. Gonzalez didn't examine my nose. However I did talk to him about getting notifications on my fit bit about irregular heart beats, and that I times I could feel a heaviness in my chest, and my thumping. These are always at night, when I get up to go to the bathroom. He ordered a cardiac monitor, which I am picking up tomorrow. Anyway, I just had another bloody nose, it wasn't a bad one, but this is not normal for me. Was wondering what you suggest I do? Thanks

## 2024-01-22 ENCOUNTER — OFFICE VISIT (OUTPATIENT)
Dept: ENT CLINIC | Age: 69
End: 2024-01-22
Payer: MEDICARE

## 2024-01-22 ENCOUNTER — NURSE ONLY (OUTPATIENT)
Dept: CARDIOLOGY CLINIC | Age: 69
End: 2024-01-22

## 2024-01-22 VITALS
DIASTOLIC BLOOD PRESSURE: 89 MMHG | BODY MASS INDEX: 41.72 KG/M2 | HEIGHT: 61 IN | RESPIRATION RATE: 16 BRPM | SYSTOLIC BLOOD PRESSURE: 136 MMHG | TEMPERATURE: 96.9 F | HEART RATE: 79 BPM | WEIGHT: 221 LBS

## 2024-01-22 DIAGNOSIS — R04.0 EPISTAXIS: Primary | ICD-10-CM

## 2024-01-22 PROCEDURE — 3075F SYST BP GE 130 - 139MM HG: CPT | Performed by: OTOLARYNGOLOGY

## 2024-01-22 PROCEDURE — 93246 EXT ECG>7D<15D RECORDING: CPT | Performed by: INTERNAL MEDICINE

## 2024-01-22 PROCEDURE — 3079F DIAST BP 80-89 MM HG: CPT | Performed by: OTOLARYNGOLOGY

## 2024-01-22 PROCEDURE — 99203 OFFICE O/P NEW LOW 30 MIN: CPT | Performed by: OTOLARYNGOLOGY

## 2024-01-22 PROCEDURE — 30901 CONTROL OF NOSEBLEED: CPT | Performed by: OTOLARYNGOLOGY

## 2024-01-22 PROCEDURE — 1123F ACP DISCUSS/DSCN MKR DOCD: CPT | Performed by: OTOLARYNGOLOGY

## 2024-01-22 NOTE — PROGRESS NOTES
CHIEF COMPLAINT: Epistaxis    HISTORY OF PRESENT ILLNESS:  68 y.o. female who presents with epistaxis, right-sided, 2 weeks duration.  Had several small bleeds 2 weeks ago.  10 days ago had a major bleed from the right which is mostly anterior but some posterior she put her head back.  She was treated at the emergency room and it stopped.  She has had several small bleeds in the last 10 days.  No nasal obstruction.  She is not anticoagulated.    PAST MEDICAL HISTORY:   Social History     Tobacco Use   Smoking Status Former    Current packs/day: 0.00    Average packs/day: 2.0 packs/day for 15.0 years (30.0 ttl pk-yrs)    Types: Cigarettes    Start date: 3/20/1990    Quit date: 3/20/2005    Years since quittin.8   Smokeless Tobacco Never                                                    Social History     Substance and Sexual Activity   Alcohol Use Yes    Comment: 2 per night                                                    Current Outpatient Medications:     losartan (COZAAR) 100 MG tablet, TAKE 1 TABLET BY MOUTH EVERY DAY, Disp: 90 tablet, Rfl: 3    famotidine (PEPCID) 20 MG tablet, TAKE 1 TABLET BY MOUTH TWICE A DAY, Disp: 180 tablet, Rfl: 3    mycophenolate (CELLCEPT) 500 MG tablet, TAKE 2 TABLETS BY MOUTH TWICE A DAY, Disp: 360 tablet, Rfl: 1    Omega-3 Fatty Acids (FISH OIL) 1000 MG CAPS, Take 3 capsules by mouth 3 times daily, Disp: , Rfl:     B COMPLEX-C PO, Take by mouth, Disp: , Rfl:     Multiple Minerals-Vitamins (CALCIUM-MAGNESIUM-ZINC-D3 PO), Take by mouth, Disp: , Rfl:     Nutritional Supplements (VITAMIN D BOOSTER PO), Take by mouth, Disp: , Rfl:                                                  Past Medical History:   Diagnosis Date    Allergic rhinitis     Arthritis     Benign essential HTN 2019    Fracture of nasal bone     GERD (gastroesophageal reflux disease)     Headache     Nosebleed     Polymyositis (HCC) 2019    Raynaud's disease without gangrene 2019

## 2024-02-08 ENCOUNTER — OFFICE VISIT (OUTPATIENT)
Dept: CARDIOLOGY CLINIC | Age: 69
End: 2024-02-08
Payer: MEDICARE

## 2024-02-08 VITALS
HEART RATE: 152 BPM | DIASTOLIC BLOOD PRESSURE: 92 MMHG | SYSTOLIC BLOOD PRESSURE: 144 MMHG | WEIGHT: 222.4 LBS | BODY MASS INDEX: 42.02 KG/M2

## 2024-02-08 DIAGNOSIS — R06.83 SNORING: ICD-10-CM

## 2024-02-08 DIAGNOSIS — I48.91 NEW ONSET ATRIAL FIBRILLATION (HCC): Primary | ICD-10-CM

## 2024-02-08 DIAGNOSIS — R06.02 SOB (SHORTNESS OF BREATH): ICD-10-CM

## 2024-02-08 DIAGNOSIS — I10 BENIGN ESSENTIAL HTN: ICD-10-CM

## 2024-02-08 PROCEDURE — 99204 OFFICE O/P NEW MOD 45 MIN: CPT | Performed by: INTERNAL MEDICINE

## 2024-02-08 PROCEDURE — 93000 ELECTROCARDIOGRAM COMPLETE: CPT | Performed by: INTERNAL MEDICINE

## 2024-02-08 PROCEDURE — 3080F DIAST BP >= 90 MM HG: CPT | Performed by: INTERNAL MEDICINE

## 2024-02-08 PROCEDURE — 3077F SYST BP >= 140 MM HG: CPT | Performed by: INTERNAL MEDICINE

## 2024-02-08 PROCEDURE — 1123F ACP DISCUSS/DSCN MKR DOCD: CPT | Performed by: INTERNAL MEDICINE

## 2024-02-08 PROCEDURE — 93248 EXT ECG>7D<15D REV&INTERPJ: CPT | Performed by: INTERNAL MEDICINE

## 2024-02-08 NOTE — PATIENT INSTRUCTIONS
Lifestyle modifications for atrial fibrillation:    Keep your blood pressure under control.   Keep your blood sugar under control.   Eat heart healthy diet  Minimize alcohol intake  Stop smoking  Be active, keep your body weight optimal  Exercise on a regular basis  Manage your stress   If you snore, get evaluated for sleep apnea  Be aware of the symptoms of stroke    Start Eliquis 5 mg twice a day and metoprolol 25 mg twice a day  Monitor your BP and heart rate daily and let us know if your BP is too low (less than 100/60)  Referred to Dr. De Leon to rule out sleep apnea        External Cardioversion    Date of Procedure:     Time of Arrival:    Cardiologist performing procedure: Dr. Muñoz    Arrive at [facility] through the main entrance.  Check in at the Outpatient Diagnostic desk on the 1st floor.    Do not eat or drink anything after midnight the night before the procedure.      You may brush your teeth and rinse the morning of the procedure.    Do NOT stop taking Eliquis (blood thinners). It is very important to not miss any doses of Eliquis leading up to the cardioversion.  Please call our office if you accidentally miss a dose of your blood thinner.    Take all your other routine medications the morning of the procedure with the following exceptions:  If you are taking diabetic medications, please HOLD on the day of the procedure (including insulin).  If you take Lantus insulin, take HALF of your usual dose the night before.  If you take a water pill, please HOLD on the day of the procedure.    Do not put on any lotion, powder, or deodorant the morning of the procedure.    Please bring a list of your medications to the hospital with you.    You must have someone available to drive you home. It is recommended that you do not drive for 24 hours after the procedure. You will also need someone with you at home the night of the procedure.    If you are unable to make this appointment, please call [facility]

## 2024-02-08 NOTE — PROGRESS NOTES
your blood pressure under control.   Keep your blood sugar under control.   Eat heart healthy diet  Minimize alcohol intake  Stop smoking  Be active, keep your body weight optimal  Exercise on a regular basis  Manage your stress   If you snore, get evaluated for sleep apnea  Be aware of the symptoms of stroke    Treatment options including cardioversion, anti-arrhythmic medication therapy, rate control strategy, oral anticoagulation, and atrial fibrillation ablation were discussed with patient. Risks, benefits and alternative of each treatment options were explained. All questions answered.     Atrial Fibrillation:    BMI    :   Body mass index is 42.02 kg/m².    Duration   :   1/2024    Symptoms   :   Shortness of breath, palpitations, easy fatigability, dyspnea on exertion, decline in his functional capacity    Previous DCCV :   none    Previous AAD             :   none    Beta blocker  :   none    ACE / ARB  :   losartan    OAC   :   none    LVEF    :   no echo    Left atrial size  :   no echo    DARIEN   :   not tested    Past Medical History:   Diagnosis Date    Allergic rhinitis     Arthritis     Benign essential HTN 03/07/2019    Fracture of nasal bone     GERD (gastroesophageal reflux disease)     Headache     Nosebleed     Polymyositis (HCC) 08/21/2019    Raynaud's disease without gangrene 03/07/2019        Past Surgical History:   Procedure Laterality Date    BREAST BIOPSY  1990    benign    MUSCLE BIOPSY Left 10/08/2019    LEFT DELTOID MUSCLE BIOPSY, LEFT QUADRICEP MUSCLE BIOPSY performed by Lucho Genao DO at [facility] OR       Allergies:  No Known Allergies    Medication:   Prior to Admission medications    Medication Sig Start Date End Date Taking? Authorizing Provider   losartan (COZAAR) 100 MG tablet TAKE 1 TABLET BY MOUTH EVERY DAY 6/21/23  Yes Bharath Yadav MD   famotidine (PEPCID) 20 MG tablet TAKE 1 TABLET BY MOUTH TWICE A DAY 4/22/23  Yes Bharath Yadav MD   mycophenolate (CELLCEPT) 500 MG

## 2024-02-09 ENCOUNTER — TELEPHONE (OUTPATIENT)
Dept: CARDIOLOGY CLINIC | Age: 69
End: 2024-02-09

## 2024-02-09 DIAGNOSIS — R00.2 PALPITATIONS: ICD-10-CM

## 2024-02-09 DIAGNOSIS — R00.2 PALPITATION: Primary | ICD-10-CM

## 2024-02-09 NOTE — TELEPHONE ENCOUNTER
The final report for the cam monitor has been read and scanned under the media tab. Thank you for your referral. Please feel free to contact our office with any questions at  [phone].

## 2024-02-13 ENCOUNTER — OFFICE VISIT (OUTPATIENT)
Dept: PULMONOLOGY | Age: 69
End: 2024-02-13
Payer: MEDICARE

## 2024-02-13 VITALS
HEIGHT: 61 IN | WEIGHT: 235 LBS | BODY MASS INDEX: 44.37 KG/M2 | DIASTOLIC BLOOD PRESSURE: 88 MMHG | SYSTOLIC BLOOD PRESSURE: 126 MMHG | OXYGEN SATURATION: 98 % | HEART RATE: 66 BPM

## 2024-02-13 DIAGNOSIS — G47.10 HYPERSOMNIA: Primary | ICD-10-CM

## 2024-02-13 DIAGNOSIS — I10 BENIGN ESSENTIAL HTN: ICD-10-CM

## 2024-02-13 DIAGNOSIS — E66.01 MORBID OBESITY WITH BMI OF 40.0-44.9, ADULT (HCC): ICD-10-CM

## 2024-02-13 DIAGNOSIS — I48.91 ATRIAL FIBRILLATION, UNSPECIFIED TYPE (HCC): ICD-10-CM

## 2024-02-13 PROCEDURE — 3079F DIAST BP 80-89 MM HG: CPT | Performed by: STUDENT IN AN ORGANIZED HEALTH CARE EDUCATION/TRAINING PROGRAM

## 2024-02-13 PROCEDURE — 3074F SYST BP LT 130 MM HG: CPT | Performed by: STUDENT IN AN ORGANIZED HEALTH CARE EDUCATION/TRAINING PROGRAM

## 2024-02-13 PROCEDURE — 99204 OFFICE O/P NEW MOD 45 MIN: CPT | Performed by: STUDENT IN AN ORGANIZED HEALTH CARE EDUCATION/TRAINING PROGRAM

## 2024-02-13 PROCEDURE — 1123F ACP DISCUSS/DSCN MKR DOCD: CPT | Performed by: STUDENT IN AN ORGANIZED HEALTH CARE EDUCATION/TRAINING PROGRAM

## 2024-02-13 NOTE — PROGRESS NOTES
[facility]  Sleep Medicine  [address], [address]  [place] [address]    Chief Complaint   Patient presents with    Snoring       Elo Paul is a 68 y.o. female who comes in for sleep evaluation.  Her complaints include  nocturnal palpitations . Onset of symptoms was a few years ago. Patient was referred here by Electrophysiology (new onset a-fib) for evaluation for and management of DARIEN.    Pertinent PMHx includes: a-fib, hypertension, polymyositis, severe obesity.    She goes to bed at variable times (due to her polymyositis, she has to sleep in the couch). She falls asleep in variable durations of time   .  She awakens a couple times per night. She awakens at about 6-8:30am. She does not use sleep aid/s. She does take daytime naps but very rare. She describes the symptoms as daytime sleepiness, fatigue, snoring, palpitations, morning dry mouth, and acid reflux.  She does not have symptoms that fulfill the criteria for restless legs syndrome. She has not felt extremely sleepy while driving. She denies recent significant weight gain .     Previous evaluation and treatment has included: none.    Family hx of sleep disorders: not to patient's knowledge  Caffeinated drinks/day: 2-3 cups of coffee per day  Alcohol intake/day/week: 2 drinks per week  Occupation: retired      DATA REVIEWED TODAY:  [place] & Insomnia Severity Index scores      2/13/2024     9:24 AM   Sleep Medicine   Sitting and reading 0   Watching TV 0   Sitting, inactive in a public place (e.g. a theatre or a meeting) 0   As a passenger in a car for an hour without a break 0   Lying down to rest in the afternoon when circumstances permit 0   Sitting and talking to someone 0   Sitting quietly after a lunch without alcohol 0   In a car, while stopped for a few minutes in traffic 0   [place] Sleepiness Score 0   Neck circumference (Inches) 15         2/13/2024     9:00 AM   Insomnia Severity Index (COV)   Difficulty falling

## 2024-02-13 NOTE — PATIENT INSTRUCTIONS
Patient information on the evaluation procedure for sleep apnea:    1. You are going to have a portable sleep study:  This is a home sleep study that will be done to see if you have obstructive sleep apnea. You will have a flow monitor on your nose, belt on your chest and a oxygen/heart rate monitor on your finger. Please do not wear nail polish on day of the study as it will interfere with the oxygen reading probe that is placed on your finger during the study.   Once you receive the device, you will also receive instructions on how to put it on and turn it on.  After 2 nights you will return it.    2. The sleep office will call you with the results a week after the study.     If the study showed that you have obstructive sleep apnea you will be told of the next step in your care. Commonly the recommended treatment is CPAP Therapy. If you agree to this therapy and you receive your CPAP before your next appointment, please bring it with you to your first follow-up appointment.      Patients who have obstructive sleep apnea on the sleep study and have chosen to try CPAP:  A home equipment company will contact you to set you up with a CPAP machine and fit you for a mask.    Please bring your CPAP, the mask and all supplies including the electrical cord with you to all your first follow up appointments with the sleep physician    Please keep trying to use your CPAP until you have seen in the sleep office in follow up as a lot of the problems patients have with CPAP can be addressed and corrected by your sleep provider.    Sleep center contact information:  [facility]: [phone]  [facility]: [phone]             What are the risk factors for Obstructive Sleep Apnea (DARIEN)?  Obesity  Snoring  Daytime sleepiness  Increasing age  Male gender  Taking sedating medications  Alcohol use  Hypertension  Stroke  Diabetes  Smoking     What is DARIEN?  People with DARIEN experience recurrent

## 2024-02-19 ENCOUNTER — PROCEDURE VISIT (OUTPATIENT)
Dept: CARDIOLOGY CLINIC | Age: 69
End: 2024-02-19
Payer: MEDICARE

## 2024-02-19 DIAGNOSIS — R06.02 SOB (SHORTNESS OF BREATH): ICD-10-CM

## 2024-02-19 DIAGNOSIS — I48.91 NEW ONSET ATRIAL FIBRILLATION (HCC): ICD-10-CM

## 2024-02-19 LAB
LEFT VENTRICULAR EJECTION FRACTION HIGH VALUE: 35 %
LEFT VENTRICULAR EJECTION FRACTION MODE: NORMAL
LV EF: 30 %

## 2024-02-19 PROCEDURE — 93306 TTE W/DOPPLER COMPLETE: CPT | Performed by: INTERNAL MEDICINE

## 2024-02-20 DIAGNOSIS — R06.02 SHORT OF BREATH ON EXERTION: Primary | ICD-10-CM

## 2024-02-20 DIAGNOSIS — I50.20 HFREF (HEART FAILURE WITH REDUCED EJECTION FRACTION) (HCC): ICD-10-CM

## 2024-02-21 ENCOUNTER — TELEPHONE (OUTPATIENT)
Dept: CARDIOLOGY CLINIC | Age: 69
End: 2024-02-21

## 2024-02-21 ENCOUNTER — HOSPITAL ENCOUNTER (OUTPATIENT)
Dept: NON INVASIVE DIAGNOSTICS | Age: 69
Discharge: HOME | End: 2024-02-21
Attending: INTERNAL MEDICINE
Payer: MEDICARE

## 2024-02-21 DIAGNOSIS — R06.02 SHORT OF BREATH ON EXERTION: ICD-10-CM

## 2024-02-21 DIAGNOSIS — I50.20 HFREF (HEART FAILURE WITH REDUCED EJECTION FRACTION) (HCC): ICD-10-CM

## 2024-02-21 PROCEDURE — A9502 TC99M TETROFOSMIN: HCPCS | Performed by: INTERNAL MEDICINE

## 2024-02-21 PROCEDURE — 6360000002 HC RX W HCPCS: Performed by: INTERNAL MEDICINE

## 2024-02-21 PROCEDURE — 3430000000 HC RX DIAGNOSTIC RADIOPHARMACEUTICAL: Performed by: INTERNAL MEDICINE

## 2024-02-21 PROCEDURE — 78452 HT MUSCLE IMAGE SPECT MULT: CPT

## 2024-02-21 RX ORDER — REGADENOSON 0.08 MG/ML
0.4 INJECTION, SOLUTION INTRAVENOUS
Status: COMPLETED | OUTPATIENT
Start: 2024-02-21 | End: 2024-02-21

## 2024-02-21 RX ADMIN — TETROFOSMIN 30 MILLICURIE: 1.38 INJECTION, POWDER, LYOPHILIZED, FOR SOLUTION INTRAVENOUS at 10:20

## 2024-02-21 RX ADMIN — REGADENOSON 0.4 MG: 0.08 INJECTION, SOLUTION INTRAVENOUS at 10:20

## 2024-02-21 RX ADMIN — TETROFOSMIN 10 MILLICURIE: 1.38 INJECTION, POWDER, LYOPHILIZED, FOR SOLUTION INTRAVENOUS at 09:10

## 2024-02-21 NOTE — TELEPHONE ENCOUNTER
Abnormal MPI. Per UL, schedule pt with intervention for possible LHC. Scheduled OV with YS on 2/26/24. LVM asking pt to call back to discuss results.

## 2024-02-21 NOTE — TELEPHONE ENCOUNTER
Spoke with pt, reviewed results of stress test. She is agreeable to see YS on 2/26. Advised to go to the ED if she develops CP, SOB, diaphoresis, arm pain, or nausea. Pt verbalized understanding.

## 2024-02-22 ENCOUNTER — HOSPITAL ENCOUNTER (OUTPATIENT)
Dept: SLEEP CENTER | Age: 69
Discharge: HOME | End: 2024-02-22
Payer: MEDICARE

## 2024-02-22 DIAGNOSIS — E66.01 MORBID OBESITY WITH BMI OF 40.0-44.9, ADULT (HCC): ICD-10-CM

## 2024-02-22 DIAGNOSIS — I48.91 ATRIAL FIBRILLATION, UNSPECIFIED TYPE (HCC): ICD-10-CM

## 2024-02-22 DIAGNOSIS — G47.10 HYPERSOMNIA: ICD-10-CM

## 2024-02-22 DIAGNOSIS — I10 BENIGN ESSENTIAL HTN: ICD-10-CM

## 2024-02-22 PROCEDURE — 95806 SLEEP STUDY UNATT&RESP EFFT: CPT

## 2024-02-26 ENCOUNTER — TELEPHONE (OUTPATIENT)
Dept: CARDIOLOGY CLINIC | Age: 69
End: 2024-02-26

## 2024-02-26 ENCOUNTER — OFFICE VISIT (OUTPATIENT)
Dept: CARDIOLOGY CLINIC | Age: 69
End: 2024-02-26
Payer: MEDICARE

## 2024-02-26 ENCOUNTER — HOSPITAL ENCOUNTER (INPATIENT)
Age: 69
LOS: 2 days | Discharge: HOME | End: 2024-02-28
Attending: HOSPITALIST | Admitting: STUDENT IN AN ORGANIZED HEALTH CARE EDUCATION/TRAINING PROGRAM
Payer: MEDICARE

## 2024-02-26 VITALS
WEIGHT: 217.8 LBS | DIASTOLIC BLOOD PRESSURE: 88 MMHG | HEART RATE: 132 BPM | SYSTOLIC BLOOD PRESSURE: 142 MMHG | BODY MASS INDEX: 41.15 KG/M2

## 2024-02-26 DIAGNOSIS — R94.39 ABNORMAL STRESS TEST: Primary | ICD-10-CM

## 2024-02-26 DIAGNOSIS — I10 BENIGN ESSENTIAL HTN: ICD-10-CM

## 2024-02-26 DIAGNOSIS — I48.19 PERSISTENT ATRIAL FIBRILLATION (HCC): ICD-10-CM

## 2024-02-26 PROBLEM — I48.91 ATRIAL FIBRILLATION WITH RAPID VENTRICULAR RESPONSE (HCC): Status: ACTIVE | Noted: 2024-02-26

## 2024-02-26 LAB
APTT BLD: 33.1 SEC (ref 22.7–35.9)
DEPRECATED RDW RBC AUTO: 14.6 % (ref 12.4–15.4)
HCT VFR BLD AUTO: 43.6 % (ref 36–48)
HGB BLD-MCNC: 14.6 G/DL (ref 12–16)
INR PPP: 1.17 (ref 0.84–1.16)
MCH RBC QN AUTO: 28.7 PG (ref 26–34)
MCHC RBC AUTO-ENTMCNC: 33.5 G/DL (ref 31–36)
MCV RBC AUTO: 85.7 FL (ref 80–100)
PLATELET # BLD AUTO: 270 K/UL (ref 135–450)
PMV BLD AUTO: 8.2 FL (ref 5–10.5)
PROTHROMBIN TIME: 14.9 SEC (ref 11.5–14.8)
RBC # BLD AUTO: 5.09 M/UL (ref 4–5.2)
WBC # BLD AUTO: 8.2 K/UL (ref 4–11)

## 2024-02-26 PROCEDURE — 3079F DIAST BP 80-89 MM HG: CPT | Performed by: INTERNAL MEDICINE

## 2024-02-26 PROCEDURE — 85730 THROMBOPLASTIN TIME PARTIAL: CPT

## 2024-02-26 PROCEDURE — 2060000000 HC ICU INTERMEDIATE R&B

## 2024-02-26 PROCEDURE — 2500000003 HC RX 250 WO HCPCS: Performed by: STUDENT IN AN ORGANIZED HEALTH CARE EDUCATION/TRAINING PROGRAM

## 2024-02-26 PROCEDURE — 6360000002 HC RX W HCPCS: Performed by: STUDENT IN AN ORGANIZED HEALTH CARE EDUCATION/TRAINING PROGRAM

## 2024-02-26 PROCEDURE — 85610 PROTHROMBIN TIME: CPT

## 2024-02-26 PROCEDURE — 99205 OFFICE O/P NEW HI 60 MIN: CPT | Performed by: INTERNAL MEDICINE

## 2024-02-26 PROCEDURE — 85027 COMPLETE CBC AUTOMATED: CPT

## 2024-02-26 PROCEDURE — 93000 ELECTROCARDIOGRAM COMPLETE: CPT | Performed by: INTERNAL MEDICINE

## 2024-02-26 PROCEDURE — 3077F SYST BP >= 140 MM HG: CPT | Performed by: INTERNAL MEDICINE

## 2024-02-26 PROCEDURE — 6370000000 HC RX 637 (ALT 250 FOR IP): Performed by: STUDENT IN AN ORGANIZED HEALTH CARE EDUCATION/TRAINING PROGRAM

## 2024-02-26 PROCEDURE — 6360000002 HC RX W HCPCS: Performed by: INTERNAL MEDICINE

## 2024-02-26 PROCEDURE — 84443 ASSAY THYROID STIM HORMONE: CPT

## 2024-02-26 PROCEDURE — 2580000003 HC RX 258: Performed by: STUDENT IN AN ORGANIZED HEALTH CARE EDUCATION/TRAINING PROGRAM

## 2024-02-26 PROCEDURE — 36415 COLL VENOUS BLD VENIPUNCTURE: CPT

## 2024-02-26 RX ORDER — HEPARIN SODIUM 1000 [USP'U]/ML
2000 INJECTION, SOLUTION INTRAVENOUS; SUBCUTANEOUS PRN
Status: DISCONTINUED | OUTPATIENT
Start: 2024-02-26 | End: 2024-02-28 | Stop reason: HOSPADM

## 2024-02-26 RX ORDER — METOPROLOL TARTRATE 50 MG/1
50 TABLET, FILM COATED ORAL 2 TIMES DAILY
Qty: 60 TABLET | Refills: 5 | Status: SHIPPED | OUTPATIENT
Start: 2024-02-26

## 2024-02-26 RX ORDER — HEPARIN SODIUM 10000 [USP'U]/100ML
5-30 INJECTION, SOLUTION INTRAVENOUS CONTINUOUS
Status: DISCONTINUED | OUTPATIENT
Start: 2024-02-26 | End: 2024-02-28

## 2024-02-26 RX ORDER — ACETAMINOPHEN 325 MG/1
650 TABLET ORAL EVERY 6 HOURS PRN
Status: DISCONTINUED | OUTPATIENT
Start: 2024-02-26 | End: 2024-02-28 | Stop reason: HOSPADM

## 2024-02-26 RX ORDER — SODIUM CHLORIDE 0.9 % (FLUSH) 0.9 %
5-40 SYRINGE (ML) INJECTION PRN
Status: DISCONTINUED | OUTPATIENT
Start: 2024-02-26 | End: 2024-02-28 | Stop reason: HOSPADM

## 2024-02-26 RX ORDER — SODIUM CHLORIDE 0.9 % (FLUSH) 0.9 %
5-40 SYRINGE (ML) INJECTION EVERY 12 HOURS SCHEDULED
Status: DISCONTINUED | OUTPATIENT
Start: 2024-02-26 | End: 2024-02-28 | Stop reason: HOSPADM

## 2024-02-26 RX ORDER — SODIUM CHLORIDE 9 MG/ML
INJECTION, SOLUTION INTRAVENOUS PRN
Status: DISCONTINUED | OUTPATIENT
Start: 2024-02-26 | End: 2024-02-28 | Stop reason: HOSPADM

## 2024-02-26 RX ORDER — ONDANSETRON 2 MG/ML
4 INJECTION INTRAMUSCULAR; INTRAVENOUS EVERY 6 HOURS PRN
Status: DISCONTINUED | OUTPATIENT
Start: 2024-02-26 | End: 2024-02-28 | Stop reason: HOSPADM

## 2024-02-26 RX ORDER — ONDANSETRON 4 MG/1
4 TABLET, ORALLY DISINTEGRATING ORAL EVERY 8 HOURS PRN
Status: DISCONTINUED | OUTPATIENT
Start: 2024-02-26 | End: 2024-02-28 | Stop reason: HOSPADM

## 2024-02-26 RX ORDER — DILTIAZEM HYDROCHLORIDE 5 MG/ML
10 INJECTION INTRAVENOUS ONCE
Status: COMPLETED | OUTPATIENT
Start: 2024-02-26 | End: 2024-02-26

## 2024-02-26 RX ORDER — METOPROLOL TARTRATE 50 MG/1
50 TABLET, FILM COATED ORAL 2 TIMES DAILY
Status: DISCONTINUED | OUTPATIENT
Start: 2024-02-26 | End: 2024-02-27

## 2024-02-26 RX ORDER — VITAMIN C
1 TAB ORAL DAILY
Status: DISCONTINUED | OUTPATIENT
Start: 2024-02-27 | End: 2024-02-28 | Stop reason: HOSPADM

## 2024-02-26 RX ORDER — POLYETHYLENE GLYCOL 3350 17 G/17G
17 POWDER, FOR SOLUTION ORAL DAILY PRN
Status: DISCONTINUED | OUTPATIENT
Start: 2024-02-26 | End: 2024-02-28 | Stop reason: HOSPADM

## 2024-02-26 RX ORDER — MYCOPHENOLATE MOFETIL 500 MG/1
500 TABLET ORAL 2 TIMES DAILY
Status: DISCONTINUED | OUTPATIENT
Start: 2024-02-26 | End: 2024-02-26

## 2024-02-26 RX ORDER — HEPARIN SODIUM 1000 [USP'U]/ML
4000 INJECTION, SOLUTION INTRAVENOUS; SUBCUTANEOUS PRN
Status: DISCONTINUED | OUTPATIENT
Start: 2024-02-26 | End: 2024-02-28 | Stop reason: HOSPADM

## 2024-02-26 RX ORDER — ACETAMINOPHEN 650 MG/1
650 SUPPOSITORY RECTAL EVERY 6 HOURS PRN
Status: DISCONTINUED | OUTPATIENT
Start: 2024-02-26 | End: 2024-02-28 | Stop reason: HOSPADM

## 2024-02-26 RX ORDER — FAMOTIDINE 20 MG/1
20 TABLET, FILM COATED ORAL 2 TIMES DAILY
Status: DISCONTINUED | OUTPATIENT
Start: 2024-02-26 | End: 2024-02-28 | Stop reason: HOSPADM

## 2024-02-26 RX ORDER — MYCOPHENOLATE MOFETIL 500 MG/1
1000 TABLET ORAL 2 TIMES DAILY
Status: DISCONTINUED | OUTPATIENT
Start: 2024-02-26 | End: 2024-02-28 | Stop reason: HOSPADM

## 2024-02-26 RX ADMIN — DILTIAZEM HYDROCHLORIDE 10 MG: 5 INJECTION, SOLUTION INTRAVENOUS at 16:17

## 2024-02-26 RX ADMIN — METOPROLOL TARTRATE 50 MG: 50 TABLET, FILM COATED ORAL at 19:42

## 2024-02-26 RX ADMIN — HEPARIN SODIUM 10 UNITS/KG/HR: 10000 INJECTION, SOLUTION INTRAVENOUS at 20:19

## 2024-02-26 RX ADMIN — DILTIAZEM HYDROCHLORIDE 5 MG/HR: 5 INJECTION INTRAVENOUS at 16:24

## 2024-02-26 RX ADMIN — MYCOPHENOLATE MOFETIL 1000 MG: 500 TABLET, FILM COATED ORAL at 16:32

## 2024-02-26 RX ADMIN — FAMOTIDINE 20 MG: 20 TABLET ORAL at 19:41

## 2024-02-26 NOTE — TELEPHONE ENCOUNTER
Please start precert for C with Dr. Euceda. Will wait to schedule until heart rate better controlled

## 2024-02-26 NOTE — H&P
V2.0  History and Physical      Name:  Elo Paul /Age/Sex: 1955  (68 y.o. female)   MRN & CSN:  [id number] & [id number] Encounter Date/Time: 2024 2:00 PM EST   Location:  [address] [place] [address] PCP: Bharath Ydaav MD       Hospital Day: 1    Assessment and Plan:   Elo Paul is a 68 y.o. female with a pmh of polymyositis, GERD, HTN, recently diagnosed afib who presents with Atrial fibrillation with rapid ventricular response (HCC)    #Afib with RVR  #HFrEF  #Abnormal stress test  - Admit to hospital  - Telemetry  - Cardiology consulted  - Initiate diltiazem gtt for rate control, goal HR < 110  - Hold Eliquis in anticipation of LHC    #HTN - continue home metoprolol as BP allows  #GERD - Continue home famotidine  #Polymyositis - continue home Cellcept. Established with Rheumatology as outpatient        Disposition:   Current Living situation: Home  Expected Disposition: Home  Estimated D/C: 2 days    Diet Diet NPO  ADULT DIET; Regular   DVT Prophylaxis [] Lovenox, []  Heparin, [] SCDs, [] Ambulation,  [] Eliquis, [] Xarelto, [] Coumadin   Code Status Full Code   Surrogate Decision Maker/ POA Freedom Paul, spouse     Personally reviewed Lab Studies and Imaging     Discussed management of the case with Cardiology - plan to start dilt gtt, hold Eliquis, LHC once stable    Drugs that require monitoring for toxicity include dilt gtt and the method of monitoring was telemetry to monitor for bradycardia        History from:     patient    History of Present Illness:     Chief Complaint: Palpitations    Elo Paul is a 68 y.o. female with pmh of polymyositis, GERD, HTN, recently diagnosed afib who presents as direct admission from her cardiologist office due to palpitations with ongoing afib RVR. Patient was diagnosed with new onset afib earlier this month and presented to cardiologist for initial evaluation on . She was started on Eliquis, Lopressor at that time and TTE

## 2024-02-26 NOTE — PROGRESS NOTES
treated: Yes      HDL Cholesterol: 80 mg/dL      Total Cholesterol: 205 mg/dL      Imaging:       ECG (if available, Personally interpreted):    Last Monitor/Holter (if available):    Last Stress   Lexiscan 2/21/24   Summary   Overall findings represent a intermediate to high risk scan.   Reduced LVEF 42%.   Moderate size mixed perfusion defect involving the mid to distal anterior   and anteroseptum. Findings are suggestive of scar/ischemia.   Non-diagnostic EKG response due to failure to reach target heart rate.    Last Cath (if available):    Last TTE/IDALMIS 2/19/24   Summary   Left ventricular cavity size is normal.   There is mild concentric left ventricular hypertrophy.   Left ventricular function is reduced with ejection fraction estimated at   30-35%.   Diffuse left ventricular hypokinesis.   Diastolic function cannot be assessed due to an arrhythmia.   Mitral annular calcification is present.   Mild mitral regurgitation is present.   Bi-atrial enlargement.   Aortic valve appears thickened/calcified but opens adequately.   Mild to moderate tricuspid regurgitation.   Estimated pulmonary artery systolic pressure is at 56 mmHg assuming a right   atrial pressure of 8 mmHg.    Last CMR  (if available):    Last Coronary Artery Calcium Score:     Ankle-brachial index:    Carotid ultrasound screening:    Abdominal aortic aneurysm screening:    Assessment / Plan:     1. Abnormal stress test    2. Persistent atrial fibrillation (HCC)    3. Benign essential HTN       Patient with atrial fibrillation with RVR with heart rate into the 150s in the office today.  At this time given her abnormal stress test and poorly controlled A-fib I think it would be prudent just to go ahead and admit her to the hospital.  Will plan on controlling her rate with IV Cardizem.  Left heart cath, possible PCI after Eliquis held for 2-days  Transition anticoagulation to Lovenox or heparin gtt.  Risks, benefits and alternatives to cardiac cath and

## 2024-02-26 NOTE — PROGRESS NOTES
[facility] Hospitalist Transfer accept note  Transfer center PS received  Case reviewed with ER physician  Reason for Transfer:  Elo Paul 68 y.o. female with recent diagnosis of Afib and abnormal stress test . She was at the cardiologist office where she was found in Afib with RVR in 150s. She had low EF   - Recommendations: start cardizem drip. Plan for angiogram when stable      Patient has been accepted for transfer to [facility].   Once patient arrive please page ON CALL HOSPITALIST so patient can be seen.   If unable to reach physician on [employer] please call hospitalist phone (#[phone])     PCP: Bharath Yadav MD     Thanks  Alon Reese MD  Hospitalist

## 2024-02-26 NOTE — PROGRESS NOTES
Pharmacy Progress Note    Heparin LOW dose weight based infusion is ordered for patient.  Per chart review, patient has received an oral Factor Xa inhibitor (Apixaban  2/26 @ 0930) within the last 72 hours.  As oral Factor Xa inhibitors can impact the anti-Xa test calibrated to unfractionated heparin, Heparin infusion will be monitored and adjusted using aPTT's per [facility] Policy.    APTT algorithm:  Maximum initial infusion rate = 1000 units/hr    aPTT < 59         Heparin 60 units/kg bolus   Increase infusion by 4 units/kg/hr                            (maximum 4,000 units)  aPTT 59-72.9    Heparin 30 units/kg bolus   Increase infusion by 2 units/kg/hr                            (maximum 2,000 units)  aPTT      No bolus                              No change  aPTT 102.1-109       No bolus                       Decrease infusion by 1 unit/kg/hr  APTT 109.1-122.9    No bolus  Decrease infusion by 2 units/kg/hr  aPTT  > 123     Hold heparin for 1 hour         Decrease infusion by 3 units/kg/hr    Obtain aPTT 6 hours after initial bolus and 6 hours after any dose change until two consecutive therapeutic aPTTs are achieved - then daily.    Pharmacy will monitor daily to assist with adjustments & ordering of labs as needed.  If patient remains on heparin infusion 72 hours from last dose of oral factor Xa inhibitor, pharmacy will change infusion back to usual monitoring via the Anti-Xa algorithm per [facility] Policy, as the interaction will no longer occur at that time.    Please call pharmacy with any questions -  Kenyetta Candelario, [place]  Main Pharmacy: [id number]

## 2024-02-27 ENCOUNTER — TELEPHONE (OUTPATIENT)
Dept: PULMONOLOGY | Age: 69
End: 2024-02-27

## 2024-02-27 LAB
ANION GAP SERPL CALCULATED.3IONS-SCNC: 13 MMOL/L (ref 3–16)
APTT BLD: 70.4 SEC (ref 22.7–35.9)
APTT BLD: 84.8 SEC (ref 22.7–35.9)
BASOPHILS # BLD: 0.1 K/UL (ref 0–0.2)
BASOPHILS NFR BLD: 1 %
BUN SERPL-MCNC: 11 MG/DL (ref 7–20)
CALCIUM SERPL-MCNC: 9.1 MG/DL (ref 8.3–10.6)
CHLORIDE SERPL-SCNC: 101 MMOL/L (ref 99–110)
CO2 SERPL-SCNC: 22 MMOL/L (ref 21–32)
CREAT SERPL-MCNC: 0.6 MG/DL (ref 0.6–1.2)
DEPRECATED RDW RBC AUTO: 14.7 % (ref 12.4–15.4)
EOSINOPHIL # BLD: 0.1 K/UL (ref 0–0.6)
EOSINOPHIL NFR BLD: 2 %
GFR SERPLBLD CREATININE-BSD FMLA CKD-EPI: >60 ML/MIN/{1.73_M2}
GLUCOSE SERPL-MCNC: 105 MG/DL (ref 70–99)
HCT VFR BLD AUTO: 42 % (ref 36–48)
HGB BLD-MCNC: 14 G/DL (ref 12–16)
INR PPP: 1.16 (ref 0.84–1.16)
LYMPHOCYTES # BLD: 2.3 K/UL (ref 1–5.1)
LYMPHOCYTES NFR BLD: 35.6 %
MAGNESIUM SERPL-MCNC: 2 MG/DL (ref 1.8–2.4)
MCH RBC QN AUTO: 28.3 PG (ref 26–34)
MCHC RBC AUTO-ENTMCNC: 33.3 G/DL (ref 31–36)
MCV RBC AUTO: 84.9 FL (ref 80–100)
MONOCYTES # BLD: 0.6 K/UL (ref 0–1.3)
MONOCYTES NFR BLD: 9.8 %
NEUTROPHILS # BLD: 3.4 K/UL (ref 1.7–7.7)
NEUTROPHILS NFR BLD: 51.6 %
PLATELET # BLD AUTO: 246 K/UL (ref 135–450)
PMV BLD AUTO: 8.5 FL (ref 5–10.5)
POTASSIUM SERPL-SCNC: 3.7 MMOL/L (ref 3.5–5.1)
PROTHROMBIN TIME: 14.8 SEC (ref 11.5–14.8)
RBC # BLD AUTO: 4.95 M/UL (ref 4–5.2)
SODIUM SERPL-SCNC: 136 MMOL/L (ref 136–145)
TSH SERPL DL<=0.005 MIU/L-ACNC: 2.66 UIU/ML (ref 0.27–4.2)
WBC # BLD AUTO: 6.5 K/UL (ref 4–11)

## 2024-02-27 PROCEDURE — 2580000003 HC RX 258: Performed by: NURSE PRACTITIONER

## 2024-02-27 PROCEDURE — 85730 THROMBOPLASTIN TIME PARTIAL: CPT

## 2024-02-27 PROCEDURE — 6360000002 HC RX W HCPCS: Performed by: INTERNAL MEDICINE

## 2024-02-27 PROCEDURE — 6370000000 HC RX 637 (ALT 250 FOR IP): Performed by: INTERNAL MEDICINE

## 2024-02-27 PROCEDURE — 2580000003 HC RX 258: Performed by: STUDENT IN AN ORGANIZED HEALTH CARE EDUCATION/TRAINING PROGRAM

## 2024-02-27 PROCEDURE — 2500000003 HC RX 250 WO HCPCS: Performed by: NURSE PRACTITIONER

## 2024-02-27 PROCEDURE — 80048 BASIC METABOLIC PNL TOTAL CA: CPT

## 2024-02-27 PROCEDURE — 85025 COMPLETE CBC W/AUTO DIFF WBC: CPT

## 2024-02-27 PROCEDURE — 83735 ASSAY OF MAGNESIUM: CPT

## 2024-02-27 PROCEDURE — 6360000002 HC RX W HCPCS: Performed by: STUDENT IN AN ORGANIZED HEALTH CARE EDUCATION/TRAINING PROGRAM

## 2024-02-27 PROCEDURE — 99233 SBSQ HOSP IP/OBS HIGH 50: CPT | Performed by: INTERNAL MEDICINE

## 2024-02-27 PROCEDURE — 2060000000 HC ICU INTERMEDIATE R&B

## 2024-02-27 PROCEDURE — 6370000000 HC RX 637 (ALT 250 FOR IP): Performed by: NURSE PRACTITIONER

## 2024-02-27 PROCEDURE — 85610 PROTHROMBIN TIME: CPT

## 2024-02-27 PROCEDURE — 36415 COLL VENOUS BLD VENIPUNCTURE: CPT

## 2024-02-27 PROCEDURE — 6370000000 HC RX 637 (ALT 250 FOR IP): Performed by: STUDENT IN AN ORGANIZED HEALTH CARE EDUCATION/TRAINING PROGRAM

## 2024-02-27 RX ORDER — ALPRAZOLAM 0.25 MG/1
0.25 TABLET ORAL ONCE
Status: COMPLETED | OUTPATIENT
Start: 2024-02-27 | End: 2024-02-27

## 2024-02-27 RX ORDER — LOSARTAN POTASSIUM 50 MG/1
50 TABLET ORAL DAILY
Status: DISCONTINUED | OUTPATIENT
Start: 2024-02-27 | End: 2024-02-27

## 2024-02-27 RX ORDER — METOPROLOL TARTRATE 50 MG/1
50 TABLET, FILM COATED ORAL 2 TIMES DAILY
Status: DISCONTINUED | OUTPATIENT
Start: 2024-02-27 | End: 2024-02-28 | Stop reason: HOSPADM

## 2024-02-27 RX ORDER — LOSARTAN POTASSIUM 100 MG/1
100 TABLET ORAL DAILY
Status: DISCONTINUED | OUTPATIENT
Start: 2024-02-28 | End: 2024-02-28 | Stop reason: HOSPADM

## 2024-02-27 RX ORDER — MECOBALAMIN 5000 MCG
5 TABLET,DISINTEGRATING ORAL NIGHTLY PRN
Status: DISCONTINUED | OUTPATIENT
Start: 2024-02-27 | End: 2024-02-28 | Stop reason: HOSPADM

## 2024-02-27 RX ORDER — LABETALOL HYDROCHLORIDE 5 MG/ML
10 INJECTION, SOLUTION INTRAVENOUS EVERY 4 HOURS PRN
Status: DISCONTINUED | OUTPATIENT
Start: 2024-02-27 | End: 2024-02-28 | Stop reason: HOSPADM

## 2024-02-27 RX ORDER — HYDRALAZINE HYDROCHLORIDE 25 MG/1
25 TABLET, FILM COATED ORAL EVERY 8 HOURS SCHEDULED
Status: DISCONTINUED | OUTPATIENT
Start: 2024-02-27 | End: 2024-02-27

## 2024-02-27 RX ORDER — LOSARTAN POTASSIUM 50 MG/1
50 TABLET ORAL ONCE
Status: COMPLETED | OUTPATIENT
Start: 2024-02-27 | End: 2024-02-27

## 2024-02-27 RX ORDER — HYDRALAZINE HYDROCHLORIDE 20 MG/ML
10 INJECTION INTRAMUSCULAR; INTRAVENOUS ONCE
Status: COMPLETED | OUTPATIENT
Start: 2024-02-27 | End: 2024-02-27

## 2024-02-27 RX ORDER — OXYCODONE HYDROCHLORIDE 5 MG/1
2.5 TABLET ORAL EVERY 4 HOURS PRN
Status: COMPLETED | OUTPATIENT
Start: 2024-02-27 | End: 2024-02-27

## 2024-02-27 RX ORDER — HYDRALAZINE HYDROCHLORIDE 50 MG/1
50 TABLET, FILM COATED ORAL EVERY 8 HOURS SCHEDULED
Status: DISCONTINUED | OUTPATIENT
Start: 2024-02-27 | End: 2024-02-28 | Stop reason: HOSPADM

## 2024-02-27 RX ADMIN — ALPRAZOLAM 0.25 MG: 0.25 TABLET ORAL at 13:50

## 2024-02-27 RX ADMIN — HEPARIN SODIUM 12 UNITS/KG/HR: 10000 INJECTION, SOLUTION INTRAVENOUS at 22:33

## 2024-02-27 RX ADMIN — HYDRALAZINE HYDROCHLORIDE 50 MG: 50 TABLET, FILM COATED ORAL at 17:16

## 2024-02-27 RX ADMIN — LOSARTAN POTASSIUM 50 MG: 50 TABLET, FILM COATED ORAL at 08:25

## 2024-02-27 RX ADMIN — HYDRALAZINE HYDROCHLORIDE 25 MG: 25 TABLET, FILM COATED ORAL at 08:25

## 2024-02-27 RX ADMIN — HEPARIN SODIUM 12 UNITS/KG/HR: 10000 INJECTION, SOLUTION INTRAVENOUS at 18:33

## 2024-02-27 RX ADMIN — LOSARTAN POTASSIUM 50 MG: 50 TABLET, FILM COATED ORAL at 13:55

## 2024-02-27 RX ADMIN — FAMOTIDINE 20 MG: 20 TABLET ORAL at 08:25

## 2024-02-27 RX ADMIN — FAMOTIDINE 20 MG: 20 TABLET ORAL at 20:36

## 2024-02-27 RX ADMIN — HEPARIN SODIUM 2000 UNITS: 1000 INJECTION INTRAVENOUS; SUBCUTANEOUS at 04:43

## 2024-02-27 RX ADMIN — METOPROLOL TARTRATE 50 MG: 50 TABLET, FILM COATED ORAL at 06:02

## 2024-02-27 RX ADMIN — ALPRAZOLAM 0.25 MG: 0.25 TABLET ORAL at 22:29

## 2024-02-27 RX ADMIN — METOPROLOL TARTRATE 50 MG: 50 TABLET, FILM COATED ORAL at 20:45

## 2024-02-27 RX ADMIN — HYDRALAZINE HYDROCHLORIDE 25 MG: 25 TABLET, FILM COATED ORAL at 12:59

## 2024-02-27 RX ADMIN — LABETALOL HYDROCHLORIDE 10 MG: 5 INJECTION, SOLUTION INTRAVENOUS at 11:41

## 2024-02-27 RX ADMIN — SODIUM CHLORIDE, PRESERVATIVE FREE 10 ML: 5 INJECTION INTRAVENOUS at 08:25

## 2024-02-27 RX ADMIN — OXYCODONE 2.5 MG: 5 TABLET ORAL at 16:13

## 2024-02-27 RX ADMIN — Medication 1 TABLET: at 08:26

## 2024-02-27 RX ADMIN — DILTIAZEM HYDROCHLORIDE 2.5 MG/HR: 5 INJECTION INTRAVENOUS at 11:06

## 2024-02-27 RX ADMIN — MYCOPHENOLATE MOFETIL 1000 MG: 500 TABLET, FILM COATED ORAL at 06:04

## 2024-02-27 RX ADMIN — HYDRALAZINE HYDROCHLORIDE 10 MG: 20 INJECTION, SOLUTION INTRAMUSCULAR; INTRAVENOUS at 15:32

## 2024-02-27 RX ADMIN — ACETAMINOPHEN 650 MG: 325 TABLET ORAL at 11:40

## 2024-02-27 RX ADMIN — SODIUM CHLORIDE, PRESERVATIVE FREE 5 ML: 5 INJECTION INTRAVENOUS at 22:15

## 2024-02-27 ASSESSMENT — PAIN DESCRIPTION - DESCRIPTORS
DESCRIPTORS: DULL
DESCRIPTORS: BURNING
DESCRIPTORS: BURNING
DESCRIPTORS: DULL
DESCRIPTORS: BURNING

## 2024-02-27 ASSESSMENT — PAIN DESCRIPTION - FREQUENCY
FREQUENCY: INTERMITTENT

## 2024-02-27 ASSESSMENT — PAIN DESCRIPTION - PAIN TYPE
TYPE: ACUTE PAIN

## 2024-02-27 ASSESSMENT — PAIN DESCRIPTION - ONSET
ONSET: ON-GOING

## 2024-02-27 ASSESSMENT — PAIN SCALES - GENERAL
PAINLEVEL_OUTOF10: 7
PAINLEVEL_OUTOF10: 3
PAINLEVEL_OUTOF10: 0
PAINLEVEL_OUTOF10: 3
PAINLEVEL_OUTOF10: 3
PAINLEVEL_OUTOF10: 0
PAINLEVEL_OUTOF10: 2

## 2024-02-27 ASSESSMENT — PAIN - FUNCTIONAL ASSESSMENT
PAIN_FUNCTIONAL_ASSESSMENT: ACTIVITIES ARE NOT PREVENTED

## 2024-02-27 ASSESSMENT — PAIN DESCRIPTION - LOCATION
LOCATION: HEAD
LOCATION: HEAD
LOCATION: ARM

## 2024-02-27 ASSESSMENT — PAIN DESCRIPTION - ORIENTATION
ORIENTATION: ANTERIOR
ORIENTATION: ANTERIOR
ORIENTATION: RIGHT
ORIENTATION: POSTERIOR
ORIENTATION: RIGHT

## 2024-02-27 NOTE — PROGRESS NOTES
Pt refusing scheduled cellcept due to concerns of it raising BP. MD notified. Electronically signed by Otoniel Morel RN on 2/27/2024 at 4:14 PM

## 2024-02-27 NOTE — PROGRESS NOTES
Spoke with Cardiology. Communication received from Dr. Euceda to continue to hold Heparin gtt unless SBP <160mmHg. If Heparin gtt to be resumed no bolus to be given, restart at last rate. Electronically signed by Otoniel Morel RN on 2/27/2024 at 5:46 PM

## 2024-02-27 NOTE — PROGRESS NOTES
Pt's /93, HR 77 despite administration of scheduled 50mg Metoprolol. Pt asymptomatic. MD notified. Electronically signed by Otoniel Morel RN on 2/27/2024 at 8:01 AM

## 2024-02-27 NOTE — PROGRESS NOTES
Verbal order received from Dr. Euceda to keep Heparin gtt paused until BP is controlled (SBP <160). Heparin gtt paused, see eMAR. Electronically signed by Otoniel Morel RN on 2/27/2024 at 2:52 PM

## 2024-02-27 NOTE — TELEPHONE ENCOUNTER
Patient's study is consistent with mild DARIEN. I will contact her to discuss result and treatment plan.    Keven Miller MD

## 2024-02-27 NOTE — PROGRESS NOTES
4 Eyes Admission Assessment     I agree as the admission nurse that 2 RN's have performed a thorough Head to Toe Skin Assessment on the patient. ALL assessment sites listed below have been assessed on admission.       Areas assessed by both nurses:   [x]   Head, Face, and Ears   [x]   Shoulders, Back, and Chest  [x]   Arms, Elbows, and Hands   [x]   Coccyx, Sacrum, and Ischium  [x]   Legs, Feet, and Heels        Does the Patient have Skin Breakdown?  No         Chris Prevention initiated:  No   Wound Care Orders initiated:  No      [facility] nurse consulted for Pressure Injury (Stage 3,4, Unstageable, DTI, NWPT, and Complex wounds) or Chris score 18 or lower:  No      Nurse 1 eSignature: Electronically signed by Bharath Bergeron RN on 2/27/24 at 8:19 AM EST    **SHARE this note so that the co-signing nurse is able to place an eSignature**    Nurse 2 eSignature: Electronically signed by Otoniel Morel RN on 2/27/24 at 10:00 AM EST

## 2024-02-27 NOTE — PROGRESS NOTES
Pt's /82, HR 89. Cardiology notified. Electronically signed by Otoniel Morel RN on 2/27/2024 at 2:51 PM

## 2024-02-27 NOTE — PROGRESS NOTES
V2.0  [facility] Hospitalist Progress Note      Name:  Elo Paul /Age/Sex: 1955  (68 y.o. female)   MRN & CSN:  [id number] & [id number] Encounter Date/Time: 2024 12:22 PM EST    Location:  4461/4461-01 PCP: Bharath Yadav MD       Hospital Day: 2    Assessment and Plan:   Elo Paul is a 68 y.o. female with pmh of  who presents with Atrial fibrillation with rapid ventricular response (HCC)      Plan:    #Afib with RVR  #HFrEF-EF 30 to 35%, diffuse left ventricular hypokinesis based on echocardiogram done on 2024  #Abnormal stress test-underwent stress test on 2024 which shows moderate-sized mixed perfusion defect involving the mid to distal anterior and anteroseptum  - Cardiology and EP on board   -Currently on diltiazem drip.  Heart rate controlled.  - Hold Eliquis in anticipation of C     #HTN -blood pressure uncontrolled today on home metoprolol.  Added losartan as well as hydralazine.  Labetalol ordered as as needed.  #GERD - Continue home famotidine  #Polymyositis - continue home Cellcept. Established with Rheumatology as outpatient    Diet ADULT DIET; Regular   DVT Prophylaxis [] Lovenox, []  Heparin, [] SCDs, [] Ambulation,  [] Eliquis, [] Xarelto  [] Coumadin   Code Status Full Code   Disposition From:   Expected Disposition:   Estimated Date of Discharge:   Patient requires continued admission due to C, A-fib,   Surrogate Decision Maker/ POA      Personally reviewed Lab Studies and Imaging     Reviewed EKG upon admission which shows A-fib RVR    Subjective:     Chief Complaint: No chief complaint on file.       Elo Paul is a 68 y.o. female who presents from cardiologist office for A-fib RVR.    Seen and examined in the morning.  Heart rate controlled today.  He is asymptomatic.  Denies palpitation or chest pain.  No overnight events.         Review of Systems:    Review of Systems    Negative if not mentioned above    Objective:

## 2024-02-27 NOTE — PROGRESS NOTES
Diagnosis: [x] DARIEN  (G47.33) [] CSA (G47.31) []  Other:__________________   Length of Need: [] 13 months [x]  99 Months                                          []  Other:__________________   Machine (GALLO): [] Respironics Auto (with modem for remote monitoring)       [] Other:____________________    [x]  ResMed Auto (with modem for remote monitoring)    [x]  CPAP () [] Bilevel ()   Mode: Mode:   [x] Auto [] Fixed [] Auto [] Spontaneous   Pmin:_____5____cmH2O      Pmax:_____15____cmH2O   P:_________cmH2O    EPAPmin:__________cmH2O IPAP:__________cmH2O     IPAPmax:__________cmH2O EPAP:__________cmH2O     PSmin:_______  PSmax:_______       (ResMed) PS:_________     Flex/EPR - 3 full time                          Ramp time: 30 min Flex/EPR - 3 full time                 Ramp time: 30 min   Ramp Pressure:_____4______cmH2O Ramp Pressure:____________cmH2O         Humidifier: [x] Heated ()                                               [] Passive     [x] Water chamber replacement ()/ 1 per 6 months   Mask:   [x] Nasal () /1 per 3 months [] Full Face () /1 per 3 months   [x] Patient choice -Size and fit mask [] Patient Choice - Size and fit mask   [x] Dispense: nasal cushion  [] Dispense:    [] Dispense:    [x] Headgear () / 1 per 3 months [] Headgear () / 1 per 3 months   [x] Replacement Nasal Cushion ()/2 per month [] Interface Replacement ()/1 per month   [] Replacement Nasal Pillows ()/2 per month       Tubing: [x] Heated ()/1 per 3 months                           [] Standard ()/1 per 3 months  [] Other:____________________   Filters: [x] Non-disposable ()/1 per 6 months                 [x] Ultra-Fine, Disposable ()/2 per month     Miscellaneous: [] Chin Strap ()/ 1 per 6months      [] Other:__________________________________         Start Order Date: 02/27/24    MEDICAL JUSTIFICATION:  I, the undersigned, certify that the above prescribed

## 2024-02-27 NOTE — PROGRESS NOTES
Cardiology Consult Service  Daily Progress Note        Admit Date:  2/26/2024    Subjective:     Interval history:  Denies chest pain, sob    Objective:     Medications:   metoprolol tartrate  50 mg Oral BID    hydrALAZINE  25 mg Oral 3 times per day    [START ON 2/28/2024] losartan  100 mg Oral Daily    sodium chloride flush  5-40 mL IntraVENous 2 times per day    vitamin B and C  1 tablet Oral Daily    famotidine  20 mg Oral BID    mycophenolate  1,000 mg Oral BID       IV drips:   dilTIAZem 2.5 mg/hr (02/27/24 1106)    sodium chloride      heparin (PORCINE) Infusion Stopped (02/27/24 1352)       PRN:  labetalol, sodium chloride flush, sodium chloride, ondansetron **OR** ondansetron, polyethylene glycol, acetaminophen **OR** acetaminophen, heparin (porcine), heparin (porcine)    Vitals:    02/27/24 0944 02/27/24 1102 02/27/24 1257 02/27/24 1355   BP: (!) 161/74 (!) 192/91 (!) 175/68 (!) 192/75   Pulse: 89 88 89    Resp: 18 20     Temp:  97 °F (36.1 °C)     TempSrc:  Oral     SpO2: 97% 96%     Weight:       Height:           Intake/Output Summary (Last 24 hours) at 2/27/2024 1420  Last data filed at 2/27/2024 1400  Gross per 24 hour   Intake 840 ml   Output 1350 ml   Net -510 ml     I/O last 3 completed shifts:  In: 240 [P.O.:240]  Out: 950 [Urine:950]  Wt Readings from Last 3 Encounters:   02/27/24 95.7 kg (210 lb 15.7 oz)   02/26/24 98.8 kg (217 lb 12.8 oz)   02/13/24 106.6 kg (235 lb)       Admit Wt: Weight - Scale: 100.1 kg (220 lb 10.9 oz)   Todays Wt: Weight - Scale: 95.7 kg (210 lb 15.7 oz)        Physical Exam:     Physical Exam  Constitutional:       Appearance: Normal appearance.   HENT:      Head: Normocephalic.   Cardiovascular:      Rate and Rhythm: Normal rate. Rhythm irregular.   Neurological:      Mental Status: She is alert.            Labs:   Recent Labs     02/27/24  0327      K 3.7   BUN 11   CREATININE 0.6      CO2 22   GLUCOSE 105*   CALCIUM 9.1   MG 2.00     Recent Labs      02/26/24 1840 02/27/24  0327   WBC 8.2 6.5   HGB 14.6 14.0   HCT 43.6 42.0    246   MCV 85.7 84.9     No results for input(s): \"CHOLTOT\", \"TRIG\", \"HDL\", \"CHOLHDL\", \"LDL\" in the last 72 hours.    Invalid input(s): \"LIPIDCOMM\", \"VLDCHOL\"  Recent Labs     02/26/24 1840 02/27/24  0327   INR 1.17* 1.16     No results for input(s): \"CKTOTAL\", \"CKMB\", \"CKMBINDEX\", \"TROPONINI\" in the last 72 hours.  No results for input(s): \"BNP\" in the last 72 hours.  No results for input(s): \"NTPROBNP\" in the last 72 hours.  No results for input(s): \"TSH\" in the last 72 hours.    Imaging:       Assessment & Plan:     Atrial fibrillation  Abnormal stress test  Uncontrolled blood pressure    Increase losartan to 100 mg po daily  Continue hydralazine, metoprolol  Wean off cardizem   Hold heparin gtt until BP control improved  Plan for [facility] tomorrow  Rest benefits and alternatives to cardiac catheterization discussed with the patient including heart attack, stroke, kidney failure and limb loss.  She wishes to proceed.  Reports she is having a lot of anxiety.  Will give her Xanax 0.25 mg p.o. x1     The note was completed using EMR and Dragon dictation system. Every effort was made to ensure accuracy; however, inadvertent computerized transcription errors may be present.    All questions and concerns were addressed to the patient.      Thank you for allowing to us to participate in the care or Elo Paul. Please call our service with questions.    Merary Euceda MD, [facility] [place] [address]  [address] [place] [address]  Ph: [phone]  Fax: [phone]

## 2024-02-27 NOTE — PLAN OF CARE
Problem: Pain  Goal: Verbalizes/displays adequate comfort level or baseline comfort level  Outcome: Progressing  Flowsheets (Taken 2/27/2024 1751)  Verbalizes/displays adequate comfort level or baseline comfort level:   Encourage patient to monitor pain and request assistance   Assess pain using appropriate pain scale   Administer analgesics based on type and severity of pain and evaluate response   Implement non-pharmacological measures as appropriate and evaluate response  Note: Pt has complained of headache and pain at R arm. Pt administered PRN Acetaminophen and PRN Roxicodone.      Problem: Cardiovascular - Adult  Goal: Maintains optimal cardiac output and hemodynamic stability  Outcome: Not Progressing  Flowsheets (Taken 2/27/2024 1754)  Maintains optimal cardiac output and hemodynamic stability:   Monitor blood pressure and heart rate   Monitor urine output and notify Licensed Independent Practitioner for values outside of normal range   Assess for signs of decreased cardiac output  Note: Pt has remained in A.fin with HR in 70's to low 100's. Diltiazem gtt and Heparin gtt stopped per MD communication. BP has remained elevated this shift despite scheduled PO hydralazine, losartan, and one time doses of IV Hydralazine, and IV Labetalol (see eMAR). Pt has had 850mL of urine out this shift.      Problem: Cardiovascular - Adult  Goal: Maintains optimal cardiac output and hemodynamic stability  Outcome: Not Progressing  Flowsheets (Taken 2/27/2024 1754)  Maintains optimal cardiac output and hemodynamic stability:   Monitor blood pressure and heart rate   Monitor urine output and notify Licensed Independent Practitioner for values outside of normal range   Assess for signs of decreased cardiac output  Note: Pt has remained in A.fin with HR in 70's to low 100's. Diltiazem gtt and Heparin gtt stopped per MD communication. BP has remained elevated this shift despite scheduled PO hydralazine, losartan, and one time

## 2024-02-28 ENCOUNTER — APPOINTMENT (OUTPATIENT)
Dept: CARDIAC CATH/INVASIVE PROCEDURES | Age: 69
End: 2024-02-28
Attending: HOSPITALIST
Payer: MEDICARE

## 2024-02-28 VITALS
OXYGEN SATURATION: 99 % | BODY MASS INDEX: 40.12 KG/M2 | RESPIRATION RATE: 20 BRPM | TEMPERATURE: 96.8 F | SYSTOLIC BLOOD PRESSURE: 145 MMHG | DIASTOLIC BLOOD PRESSURE: 57 MMHG | WEIGHT: 212.52 LBS | HEART RATE: 86 BPM | HEIGHT: 61 IN

## 2024-02-28 PROBLEM — G47.10 HYPERSOMNIA: Status: ACTIVE | Noted: 2024-02-28

## 2024-02-28 LAB
ANION GAP SERPL CALCULATED.3IONS-SCNC: 14 MMOL/L (ref 3–16)
APTT BLD: 124.9 SEC (ref 22.7–35.9)
APTT BLD: 61.3 SEC (ref 22.7–35.9)
APTT BLD: >180 SEC (ref 22.7–35.9)
BUN SERPL-MCNC: 8 MG/DL (ref 7–20)
CALCIUM SERPL-MCNC: 9 MG/DL (ref 8.3–10.6)
CHLORIDE SERPL-SCNC: 99 MMOL/L (ref 99–110)
CHOLEST SERPL-MCNC: 186 MG/DL (ref 0–199)
CO2 SERPL-SCNC: 20 MMOL/L (ref 21–32)
CREAT SERPL-MCNC: <0.5 MG/DL (ref 0.6–1.2)
DEPRECATED RDW RBC AUTO: 14.8 % (ref 12.4–15.4)
GFR SERPLBLD CREATININE-BSD FMLA CKD-EPI: >60 ML/MIN/{1.73_M2}
GLUCOSE SERPL-MCNC: 105 MG/DL (ref 70–99)
HCT VFR BLD AUTO: 41.8 % (ref 36–48)
HDLC SERPL-MCNC: 65 MG/DL (ref 40–60)
HGB BLD-MCNC: 13.9 G/DL (ref 12–16)
LDLC SERPL CALC-MCNC: 106 MG/DL
MAGNESIUM SERPL-MCNC: 1.9 MG/DL (ref 1.8–2.4)
MCH RBC QN AUTO: 28.8 PG (ref 26–34)
MCHC RBC AUTO-ENTMCNC: 33.2 G/DL (ref 31–36)
MCV RBC AUTO: 86.6 FL (ref 80–100)
PLATELET # BLD AUTO: 248 K/UL (ref 135–450)
PMV BLD AUTO: 8.4 FL (ref 5–10.5)
POC ACT LR: 169 SEC
POC ACT LR: 203 SEC
POTASSIUM SERPL-SCNC: 3.4 MMOL/L (ref 3.5–5.1)
RBC # BLD AUTO: 4.83 M/UL (ref 4–5.2)
SODIUM SERPL-SCNC: 133 MMOL/L (ref 136–145)
TRIGL SERPL-MCNC: 73 MG/DL (ref 0–150)
VLDLC SERPL CALC-MCNC: 15 MG/DL
WBC # BLD AUTO: 6.3 K/UL (ref 4–11)

## 2024-02-28 PROCEDURE — 99152 MOD SED SAME PHYS/QHP 5/>YRS: CPT

## 2024-02-28 PROCEDURE — 2580000003 HC RX 258: Performed by: STUDENT IN AN ORGANIZED HEALTH CARE EDUCATION/TRAINING PROGRAM

## 2024-02-28 PROCEDURE — 6360000002 HC RX W HCPCS: Performed by: INTERNAL MEDICINE

## 2024-02-28 PROCEDURE — 85347 COAGULATION TIME ACTIVATED: CPT

## 2024-02-28 PROCEDURE — 6370000000 HC RX 637 (ALT 250 FOR IP): Performed by: INTERNAL MEDICINE

## 2024-02-28 PROCEDURE — 99153 MOD SED SAME PHYS/QHP EA: CPT

## 2024-02-28 PROCEDURE — 93458 L HRT ARTERY/VENTRICLE ANGIO: CPT | Performed by: INTERNAL MEDICINE

## 2024-02-28 PROCEDURE — 6360000004 HC RX CONTRAST MEDICATION: Performed by: INTERNAL MEDICINE

## 2024-02-28 PROCEDURE — B211YZZ FLUOROSCOPY OF MULTIPLE CORONARY ARTERIES USING OTHER CONTRAST: ICD-10-PCS | Performed by: INTERNAL MEDICINE

## 2024-02-28 PROCEDURE — 93458 L HRT ARTERY/VENTRICLE ANGIO: CPT

## 2024-02-28 PROCEDURE — 2709999900 HC NON-CHARGEABLE SUPPLY

## 2024-02-28 PROCEDURE — 85027 COMPLETE CBC AUTOMATED: CPT

## 2024-02-28 PROCEDURE — 6370000000 HC RX 637 (ALT 250 FOR IP): Performed by: FAMILY MEDICINE

## 2024-02-28 PROCEDURE — 80061 LIPID PANEL: CPT

## 2024-02-28 PROCEDURE — 80048 BASIC METABOLIC PNL TOTAL CA: CPT

## 2024-02-28 PROCEDURE — 85730 THROMBOPLASTIN TIME PARTIAL: CPT

## 2024-02-28 PROCEDURE — 4A023N7 MEASUREMENT OF CARDIAC SAMPLING AND PRESSURE, LEFT HEART, PERCUTANEOUS APPROACH: ICD-10-PCS | Performed by: INTERNAL MEDICINE

## 2024-02-28 PROCEDURE — 83735 ASSAY OF MAGNESIUM: CPT

## 2024-02-28 PROCEDURE — 36415 COLL VENOUS BLD VENIPUNCTURE: CPT

## 2024-02-28 PROCEDURE — 99152 MOD SED SAME PHYS/QHP 5/>YRS: CPT | Performed by: INTERNAL MEDICINE

## 2024-02-28 PROCEDURE — 6360000002 HC RX W HCPCS

## 2024-02-28 PROCEDURE — 6370000000 HC RX 637 (ALT 250 FOR IP)

## 2024-02-28 PROCEDURE — 2500000003 HC RX 250 WO HCPCS

## 2024-02-28 PROCEDURE — C1769 GUIDE WIRE: HCPCS

## 2024-02-28 PROCEDURE — 2580000003 HC RX 258: Performed by: INTERNAL MEDICINE

## 2024-02-28 PROCEDURE — 6370000000 HC RX 637 (ALT 250 FOR IP): Performed by: STUDENT IN AN ORGANIZED HEALTH CARE EDUCATION/TRAINING PROGRAM

## 2024-02-28 PROCEDURE — 6370000000 HC RX 637 (ALT 250 FOR IP): Performed by: NURSE PRACTITIONER

## 2024-02-28 PROCEDURE — 6360000002 HC RX W HCPCS: Performed by: STUDENT IN AN ORGANIZED HEALTH CARE EDUCATION/TRAINING PROGRAM

## 2024-02-28 PROCEDURE — C1894 INTRO/SHEATH, NON-LASER: HCPCS

## 2024-02-28 RX ORDER — OXYCODONE HYDROCHLORIDE 5 MG/1
5 TABLET ORAL ONCE
Status: COMPLETED | OUTPATIENT
Start: 2024-02-28 | End: 2024-02-28

## 2024-02-28 RX ORDER — HYDRALAZINE HYDROCHLORIDE 50 MG/1
50 TABLET, FILM COATED ORAL EVERY 8 HOURS SCHEDULED
Qty: 90 TABLET | Refills: 3 | Status: SHIPPED | OUTPATIENT
Start: 2024-02-28

## 2024-02-28 RX ORDER — AMLODIPINE BESYLATE 2.5 MG/1
2.5 TABLET ORAL DAILY
Status: DISCONTINUED | OUTPATIENT
Start: 2024-02-28 | End: 2024-02-28 | Stop reason: HOSPADM

## 2024-02-28 RX ORDER — LOSARTAN POTASSIUM 100 MG/1
100 TABLET ORAL DAILY
Qty: 30 TABLET | Refills: 3 | Status: SHIPPED | OUTPATIENT
Start: 2024-02-29

## 2024-02-28 RX ORDER — SODIUM CHLORIDE 0.9 % (FLUSH) 0.9 %
5-40 SYRINGE (ML) INJECTION PRN
Status: DISCONTINUED | OUTPATIENT
Start: 2024-02-28 | End: 2024-02-28 | Stop reason: HOSPADM

## 2024-02-28 RX ORDER — AMLODIPINE BESYLATE 2.5 MG/1
2.5 TABLET ORAL DAILY
Qty: 30 TABLET | Refills: 3 | Status: SHIPPED | OUTPATIENT
Start: 2024-02-29

## 2024-02-28 RX ORDER — POTASSIUM CHLORIDE 20 MEQ/1
40 TABLET, EXTENDED RELEASE ORAL ONCE
Status: COMPLETED | OUTPATIENT
Start: 2024-02-28 | End: 2024-02-28

## 2024-02-28 RX ORDER — SODIUM CHLORIDE 9 MG/ML
INJECTION, SOLUTION INTRAVENOUS CONTINUOUS
Status: DISCONTINUED | OUTPATIENT
Start: 2024-02-28 | End: 2024-02-28 | Stop reason: HOSPADM

## 2024-02-28 RX ORDER — SODIUM CHLORIDE 0.9 % (FLUSH) 0.9 %
5-40 SYRINGE (ML) INJECTION EVERY 12 HOURS SCHEDULED
Status: DISCONTINUED | OUTPATIENT
Start: 2024-02-28 | End: 2024-02-28 | Stop reason: HOSPADM

## 2024-02-28 RX ORDER — ACETAMINOPHEN 325 MG/1
650 TABLET ORAL EVERY 4 HOURS PRN
Status: DISCONTINUED | OUTPATIENT
Start: 2024-02-28 | End: 2024-02-28 | Stop reason: SDUPTHER

## 2024-02-28 RX ADMIN — SODIUM CHLORIDE: 9 INJECTION, SOLUTION INTRAVENOUS at 15:11

## 2024-02-28 RX ADMIN — AMLODIPINE BESYLATE 2.5 MG: 2.5 TABLET ORAL at 13:44

## 2024-02-28 RX ADMIN — MYCOPHENOLATE MOFETIL 1000 MG: 500 TABLET, FILM COATED ORAL at 06:09

## 2024-02-28 RX ADMIN — HYDRALAZINE HYDROCHLORIDE 50 MG: 50 TABLET, FILM COATED ORAL at 12:50

## 2024-02-28 RX ADMIN — Medication 1 TABLET: at 10:19

## 2024-02-28 RX ADMIN — SODIUM CHLORIDE: 9 INJECTION, SOLUTION INTRAVENOUS at 10:02

## 2024-02-28 RX ADMIN — METOPROLOL TARTRATE 50 MG: 50 TABLET, FILM COATED ORAL at 07:27

## 2024-02-28 RX ADMIN — POTASSIUM CHLORIDE 40 MEQ: 1500 TABLET, EXTENDED RELEASE ORAL at 10:19

## 2024-02-28 RX ADMIN — HYDRALAZINE HYDROCHLORIDE 50 MG: 50 TABLET, FILM COATED ORAL at 05:14

## 2024-02-28 RX ADMIN — APIXABAN 5 MG: 5 TABLET, FILM COATED ORAL at 10:19

## 2024-02-28 RX ADMIN — IOPAMIDOL 60 ML: 755 INJECTION, SOLUTION INTRAVENOUS at 09:32

## 2024-02-28 RX ADMIN — OXYCODONE 5 MG: 5 TABLET ORAL at 06:08

## 2024-02-28 RX ADMIN — SODIUM CHLORIDE, PRESERVATIVE FREE 10 ML: 5 INJECTION INTRAVENOUS at 08:28

## 2024-02-28 RX ADMIN — HEPARIN SODIUM 2000 UNITS: 1000 INJECTION INTRAVENOUS; SUBCUTANEOUS at 01:05

## 2024-02-28 RX ADMIN — LABETALOL HYDROCHLORIDE 10 MG: 5 INJECTION, SOLUTION INTRAVENOUS at 03:44

## 2024-02-28 RX ADMIN — MYCOPHENOLATE MOFETIL 1000 MG: 500 TABLET, FILM COATED ORAL at 16:13

## 2024-02-28 RX ADMIN — LOSARTAN POTASSIUM 100 MG: 100 TABLET ORAL at 07:27

## 2024-02-28 RX ADMIN — FAMOTIDINE 20 MG: 20 TABLET ORAL at 06:04

## 2024-02-28 ASSESSMENT — PAIN SCALES - GENERAL: PAINLEVEL_OUTOF10: 10

## 2024-02-28 NOTE — CARE COORDINATION
CM noted DC order. Pt from home, IPTA. D/w RN- no CM needs noted upon DC.     Thank you  Precious Keller RN, BSN, CM  U   [phone]

## 2024-02-28 NOTE — PROGRESS NOTES
Pt having anxiety and BP elevated 166/70; APRN paged, prn xanax and melatonin ordered.     BP elevated again at 0300 173/89- 192/100. IV labetalol given and APRN paged. Clarified continuation of heparin in setting of hypertension. Advised to reassess BP in 1 hour and continue with heparin gtt.    /98 after PRN labetalol; pt also c/o acute pain in bilateral arms with BP cuff readings that causes them to tense up during inflation. Nocturnist paged, clarified any additional prn meds or administering AM meds early.     1 time prn oxycodone ordered, advised to rreassess in 30m.      Pt BP unchanged 180-to-190/96-to-97. Advised to administer AM metoprolol and losartan.

## 2024-02-28 NOTE — PROGRESS NOTES
Pt discharged home with all medications, discharge paperwork,a dn personal belongings. IV and telemetry removed.

## 2024-02-28 NOTE — TELEPHONE ENCOUNTER
Patient is currently admitted for A-fib RVR.  We will wait till she stabilizes before contacting her with HST results.    Keven Miller MD

## 2024-02-28 NOTE — PLAN OF CARE
Problem: Discharge Planning  Goal: Discharge to home or other facility with appropriate resources  Outcome: Adequate for Discharge     Problem: Safety - Adult  Goal: Free from fall injury  Outcome: Adequate for Discharge     Problem: ABCDS Injury Assessment  Goal: Absence of physical injury  Outcome: Adequate for Discharge     Problem: Pain  Goal: Verbalizes/displays adequate comfort level or baseline comfort level  Outcome: Adequate for Discharge     Problem: Cardiovascular - Adult  Goal: Maintains optimal cardiac output and hemodynamic stability  Outcome: Adequate for Discharge  Goal: Absence of cardiac dysrhythmias or at baseline  Outcome: Adequate for Discharge

## 2024-02-28 NOTE — PROGRESS NOTES
Heparin gtt stopped, pt to cath lab. Electronically signed by Otoniel Morel RN on 2/28/2024 at 8:38 AM

## 2024-02-28 NOTE — PROCEDURES
CARDIAC CATHETERIZATION      Elo Paul (68 y.o., female)  1955  [id number]    2/28/2024    INDICATION(s):  Cardiomyopathy, Atrial fibrillation, abnormal stress test      PROCEDURE:    Left heart cath  Coronary angiography      Risk, benefits alternatives to cardiac catheterization and possible intervention were discussed with the patient.  Informed consent was obtained.    The patient was brought to the cath lab and was prepped and draped in the normal sterile technique.  1% Xylocaine was used to anesthetize the site.  The right radial was cannulated and a 6Fr sheath was inserted under ultrasonographic guidance.      Continuous pulse-oximetry and ECG monitoring was performed, and frequent blood pressure assessment was performed. An independent trained observer pushed medications at my direction. We monitored the patient's level of consciousness and vital signs/physiologic status throughout the procedure (see start and stop times below).    All catheter were advanced through the sheath over a guide wire and advanced under fluoroscopic guidance into the ascending aorta.     We used 5Fr catheters for right and left coronary angiography and to cross the aortic valve.  Left ventricular pressure and pullback across aortic valve was recorded.    The sheath was removed and hemostasis was obtained with a TR band.  The patient was moved to the floor in stable condition.  There were no complications.    Sedation: 3 mg Versed, 50 mcg Fentanyl  Sedation start: 0853  Sedation stop: 0928      Estimated blood loss: <10 mL    HEMODYNAMIC / ANGIOGRAPHIC DATA:      /77 Left ventricular end diastolic pressure was 7 mmHg. There was no gradient across the aortic valve upon pullback.The left main coronary artery arises from the left coronary cusp giving rise to the left anterior descending artery and the left circumflex artery.  The left main reveals no angiographically significant stenosis.  The left anterior

## 2024-02-28 NOTE — PRE SEDATION
Sedation Pre-Procedure Note    Patient Name: Elo Paul   YOB: 1955  Room/Bed: 4461/4461-01  Medical Record Number: [id number]  Date: 2/28/2024   Time: 9:03 AM       Indication:  ***    Consent: {CONSENT:93766}    Vital Signs:   Vitals:    02/28/24 0826   BP: (!) 161/97   Pulse: 98   Resp: 18   Temp: 97 °F (36.1 °C)   SpO2: 93%       Past Medical History:   has a past medical history of Allergic rhinitis, Arthritis, Benign essential HTN, Fracture of nasal bone, GERD (gastroesophageal reflux disease), Headache, Nosebleed, Polymyositis (HCC), and Raynaud's disease without gangrene.    Past Surgical History:   has a past surgical history that includes Breast biopsy (1990) and Muscle biopsy (Left, 10/08/2019).    Medications:   Scheduled Meds:    metoprolol tartrate  50 mg Oral BID    losartan  100 mg Oral Daily    hydrALAZINE  50 mg Oral 3 times per day    sodium chloride flush  5-40 mL IntraVENous 2 times per day    vitamin B and C  1 tablet Oral Daily    famotidine  20 mg Oral BID    mycophenolate  1,000 mg Oral BID     Continuous Infusions:    dilTIAZem Stopped (02/27/24 1534)    sodium chloride      heparin (PORCINE) Infusion Stopped (02/28/24 0827)     PRN Meds: labetalol, melatonin, sodium chloride flush, sodium chloride, ondansetron **OR** ondansetron, polyethylene glycol, acetaminophen **OR** acetaminophen, heparin (porcine), heparin (porcine)  Home Meds:   Prior to Admission medications    Medication Sig Start Date End Date Taking? Authorizing Provider   metoprolol tartrate (LOPRESSOR) 50 MG tablet Take 1 tablet by mouth 2 times daily 2/26/24   Merary Euceda MD   apixaban (ELIQUIS) 5 MG TABS tablet Take 1 tablet by mouth 2 times daily 2/8/24   Nahun Moreno MD   famotidine (PEPCID) 20 MG tablet TAKE 1 TABLET BY MOUTH TWICE A DAY 4/22/23   Bharath Yadav MD   mycophenolate (CELLCEPT) 500 MG tablet TAKE 2 TABLETS BY MOUTH TWICE A DAY 2/14/23   Corin Meyer MD   B

## 2024-02-29 ENCOUNTER — CARE COORDINATION (OUTPATIENT)
Dept: CASE MANAGEMENT | Age: 69
End: 2024-02-29

## 2024-02-29 NOTE — CARE COORDINATION
[facility] Transitions Initial Follow Up Call    Call within 2 business days of discharge: Yes    Patient:  SHE BABCOCK   Patient :  1955  MRN:  [id number]     Reason for Admission: A-Fib with RVR   Discharge Date:  24    RARS:       Transitions of Care Initial Call    Was this an external facility discharge?     Discharge Facility: [facility]      Challenges to be reviewed by the provider   Additional needs identified to be addressed with provider:    no    AMB CC Provider Discharge Needs: none            Non-face-to-face services provided:    1ST CTC attempt to reach Pt regarding recent hospital discharge.  CTC left voice recording with call back number requesting a call back.    Follow up appointments:    Future Appointments   Date Time Provider Department Center   3/4/2024  2:45 PM Nahun Moreno MD [facility]   3/18/2024  9:30 AM Merary Euceda MD [facility]     Thank You,    Puja Welch RN  Care Transition Coordinator  Contact Number:[phone]

## 2024-02-29 NOTE — DISCHARGE SUMMARY
Hospital Medicine Discharge Summary    Patient ID: Elo Paul      Patient's PCP: Bharath Yadav MD    Admit Date: 2/26/2024     Discharge Date: 2/28/2024      Admitting Physician: Shahzad Adorno MD     Discharge Physician: Norm De Leon MD     Discharge Diagnoses:       Active Hospital Problems    Diagnosis     Atrial fibrillation with rapid ventricular response (HCC) [I48.91]        The patient was seen and examined on day of discharge and this discharge summary is in conjunction with any daily progress note from day of discharge.    Hospital Course:     Elo Paul is a 68 y.o. female with pmh of polymyositis, GERD, HTN, recently diagnosed afib who presents as direct admission from her cardiologist office due to palpitations with ongoing afib RVR. Patient was diagnosed with new onset afib earlier this month and presented to cardiologist for initial evaluation on 02/8. She was started on Eliquis, Lopressor at that time and TTE ordered. Echo completed on 02/19 with newly reduced EF of ~35%. Then underwent stress test on 02/21 with EF improved to 42% but signs of perfusion defect. Presented again to cardiology clinic today to begin planning for coronary angiogram but was found to be in RVR again with rates up to the 150s. She was directly admitted for initiation of rate control and plan to then subsequently perform angiography.     Examined patient bedside upon arrival to her hospital room. Currently she feels okay overall. Still has some palpitations but no chest pain, dyspnea, n/v/d.     Cardiology evluation as follows :      HEMODYNAMIC / ANGIOGRAPHIC DATA:       /77 Left ventricular end diastolic pressure was 7 mmHg. There was no gradient across the aortic valve upon pullback.The left main coronary artery arises from the left coronary cusp giving rise to the left anterior descending artery and the left circumflex artery.  The left main reveals no angiographically

## 2024-03-01 ENCOUNTER — CARE COORDINATION (OUTPATIENT)
Dept: CASE MANAGEMENT | Age: 69
End: 2024-03-01

## 2024-03-01 ENCOUNTER — OFFICE VISIT (OUTPATIENT)
Dept: FAMILY MEDICINE CLINIC | Age: 69
End: 2024-03-01

## 2024-03-01 VITALS
SYSTOLIC BLOOD PRESSURE: 146 MMHG | WEIGHT: 216.8 LBS | DIASTOLIC BLOOD PRESSURE: 88 MMHG | BODY MASS INDEX: 40.96 KG/M2 | OXYGEN SATURATION: 98 % | HEART RATE: 69 BPM

## 2024-03-01 DIAGNOSIS — R94.39 ABNORMAL STRESS TEST: ICD-10-CM

## 2024-03-01 DIAGNOSIS — I10 BENIGN ESSENTIAL HTN: ICD-10-CM

## 2024-03-01 DIAGNOSIS — I48.19 PERSISTENT ATRIAL FIBRILLATION (HCC): ICD-10-CM

## 2024-03-01 DIAGNOSIS — Z09 HOSPITAL DISCHARGE FOLLOW-UP: ICD-10-CM

## 2024-03-01 DIAGNOSIS — F41.9 ANXIETY: ICD-10-CM

## 2024-03-01 DIAGNOSIS — I48.91 ATRIAL FIBRILLATION WITH RAPID VENTRICULAR RESPONSE (HCC): ICD-10-CM

## 2024-03-01 DIAGNOSIS — I48.91 ATRIAL FIBRILLATION WITH RAPID VENTRICULAR RESPONSE (HCC): Primary | ICD-10-CM

## 2024-03-01 DIAGNOSIS — E78.5 HYPERLIPIDEMIA, UNSPECIFIED HYPERLIPIDEMIA TYPE: ICD-10-CM

## 2024-03-01 LAB
ANION GAP SERPL CALCULATED.3IONS-SCNC: 17 MMOL/L (ref 3–16)
BUN SERPL-MCNC: 12 MG/DL (ref 7–20)
CALCIUM SERPL-MCNC: 9.7 MG/DL (ref 8.3–10.6)
CHLORIDE SERPL-SCNC: 97 MMOL/L (ref 99–110)
CO2 SERPL-SCNC: 21 MMOL/L (ref 21–32)
CREAT SERPL-MCNC: 0.6 MG/DL (ref 0.6–1.2)
GFR SERPLBLD CREATININE-BSD FMLA CKD-EPI: >60 ML/MIN/{1.73_M2}
GLUCOSE SERPL-MCNC: 93 MG/DL (ref 70–99)
POTASSIUM SERPL-SCNC: 4.5 MMOL/L (ref 3.5–5.1)
SODIUM SERPL-SCNC: 135 MMOL/L (ref 136–145)
TSH SERPL DL<=0.005 MIU/L-ACNC: 3.66 UIU/ML (ref 0.27–4.2)

## 2024-03-01 PROCEDURE — 1111F DSCHRG MED/CURRENT MED MERGE: CPT | Performed by: FAMILY MEDICINE

## 2024-03-01 RX ORDER — CITALOPRAM HYDROBROMIDE 10 MG/1
10 TABLET ORAL DAILY
Qty: 30 TABLET | Refills: 3 | Status: SHIPPED | OUTPATIENT
Start: 2024-03-01

## 2024-03-01 RX ORDER — ATORVASTATIN CALCIUM 20 MG/1
20 TABLET, FILM COATED ORAL DAILY
Qty: 90 TABLET | Refills: 1 | Status: SHIPPED | OUTPATIENT
Start: 2024-03-01

## 2024-03-01 NOTE — TELEPHONE ENCOUNTER
I contacted patient to discuss about sleep test result showing mild sleep apnea.  Considering her history of A-fib and recent RVR complications, she is agreeable to proceed with PAP therapy.  An order for auto CPAP will be sent to [facility].  Patient needs a 31-90 days office follow-up visit.  She will call herself to schedule it.    Keven Miller MD

## 2024-03-01 NOTE — PROGRESS NOTES
Post-Discharge Transitional Care  Follow Up      Elo Paul   YOB: 1955    Date of Office Visit:  3/1/2024  Date of Hospital Admission: 2/26/24  Date of Hospital Discharge: 2/28/24  Risk of hospital readmission (high >=14%. Medium >=10%) :Readmission Risk Score: 5.3      Care management risk score Rising risk (score 2-5) and Complex Care (Scores >=6): No Risk Score On File     Non face to face  following discharge, date last encounter closed (first attempt may have been earlier): 02/29/2024    Call initiated 2 business days of discharge: Yes    ASSESSMENT/PLAN:   Atrial fibrillation with rapid ventricular response (HCC)  -Irregularly irregular rhythm today, appears to be in rate controlled, will follow-up with cardiology early next week, continue current meds for now, will get updated BMP due to low potassium on most recent  -     Basic Metabolic Panel; Future  Benign essential HTN  -Discussed potentially increasing dose of amlodipine from 2.5 mg to 5 mg daily due to elevated blood pressure, patient states she would like to wait until cardiology visit  Anxiety  -Patient has significant anxiety which is likely contributing to her A-fib, QTc from EKG on February 8 was 418, will initiate Celexa at 10 mg daily and will closely monitor, plan to follow-up in 6 weeks to assess response  The following orders have not been finalized:  -     citalopram (CELEXA) 10 MG tablet  Hyperlipidemia, unspecified hyperlipidemia type  -Current ASCVD risk guidelines recommend starting moderate intensity statin, will start atorvastatin 20 mg daily  -     atorvastatin (LIPITOR) 20 MG tablet; Take 1 tablet by mouth daily, Disp-90 tablet, R-1Normal      Medical Decision Making:   No follow-ups on file.           Subjective:   HPI:  Follow up of Hospital problems/diagnosis(es): A fib w/ RVR    Inpatient course: Discharge summary reviewed- see chart.    FROM DC SUMMARY      Patient ID: Elo Paul

## 2024-03-01 NOTE — CARE COORDINATION
Care Transitions Initial Follow Up Call    Call within 2 business days of discharge: Yes    Patient Current Location:  Home: [address] [place] [address] [place] [address]    Care Transition Nurse contacted the patient by telephone to perform post hospital discharge assessment. Verified name and  with patient as identifiers. Provided introduction to self, and explanation of the Care Transition Nurse role.     Patient: Elo Paul Patient : 1955   MRN: [id number]  Reason for Admission: A-Fib with RVR   Discharge Date: 24 RARS: Readmission Risk Score: 5.3      Last Discharge Facility       Date Complaint Diagnosis Description Type Department Provider    24   Admission (Discharged) [facility] 4 PCU Norm De Leon MD            Was this an external facility discharge? No Discharge Facility: [facility]    Challenges to be reviewed by the provider   Additional needs identified to be addressed with provider: No  none               Method of communication with provider: none.      Pt doing well, no needs. Reviewed today appt with pt, will  new meds later. Questions answered about medications. Has a daughter in law that is an NP and is helping navigate this new path. Incision site healing, no drainage or issues with pain. Pt saw PCP today and will see cardio Monday. Glad to start on Celexa to help with her anxiety. Denies SOB or chest pain and currently doesn't feel the afib. Questions answered. Pt agreeable to future calls.     Care Transition Nurse reviewed discharge instructions with patient who verbalized understanding. The patient was given an opportunity to ask questions and does not have any further questions or concerns at this time. Were discharge instructions available to patient? Yes. Reviewed appropriate site of care based on symptoms and resources available to patient including: PCP  Specialist  When to call 911. The patient agrees to contact the PCP office for questions related to their

## 2024-03-04 ENCOUNTER — TELEPHONE (OUTPATIENT)
Dept: CARDIOLOGY CLINIC | Age: 69
End: 2024-03-04

## 2024-03-04 ENCOUNTER — OFFICE VISIT (OUTPATIENT)
Dept: CARDIOLOGY CLINIC | Age: 69
End: 2024-03-04
Payer: MEDICARE

## 2024-03-04 VITALS
BODY MASS INDEX: 41 KG/M2 | SYSTOLIC BLOOD PRESSURE: 160 MMHG | HEART RATE: 106 BPM | WEIGHT: 217 LBS | DIASTOLIC BLOOD PRESSURE: 100 MMHG

## 2024-03-04 DIAGNOSIS — I25.10 CORONARY ARTERY DISEASE INVOLVING NATIVE CORONARY ARTERY OF NATIVE HEART WITHOUT ANGINA PECTORIS: ICD-10-CM

## 2024-03-04 DIAGNOSIS — G47.33 OSA (OBSTRUCTIVE SLEEP APNEA): ICD-10-CM

## 2024-03-04 DIAGNOSIS — I50.20 HFREF (HEART FAILURE WITH REDUCED EJECTION FRACTION) (HCC): ICD-10-CM

## 2024-03-04 DIAGNOSIS — I48.19 PERSISTENT ATRIAL FIBRILLATION (HCC): Primary | ICD-10-CM

## 2024-03-04 DIAGNOSIS — I42.9 CARDIOMYOPATHY, UNSPECIFIED TYPE (HCC): ICD-10-CM

## 2024-03-04 DIAGNOSIS — I10 BENIGN ESSENTIAL HTN: ICD-10-CM

## 2024-03-04 PROCEDURE — 3077F SYST BP >= 140 MM HG: CPT | Performed by: INTERNAL MEDICINE

## 2024-03-04 PROCEDURE — 99215 OFFICE O/P EST HI 40 MIN: CPT | Performed by: INTERNAL MEDICINE

## 2024-03-04 PROCEDURE — 3080F DIAST BP >= 90 MM HG: CPT | Performed by: INTERNAL MEDICINE

## 2024-03-04 PROCEDURE — 1123F ACP DISCUSS/DSCN MKR DOCD: CPT | Performed by: INTERNAL MEDICINE

## 2024-03-04 PROCEDURE — 93000 ELECTROCARDIOGRAM COMPLETE: CPT | Performed by: INTERNAL MEDICINE

## 2024-03-04 RX ORDER — AMIODARONE HYDROCHLORIDE 200 MG/1
200 TABLET ORAL DAILY
Qty: 90 TABLET | Refills: 3 | Status: SHIPPED | OUTPATIENT
Start: 2024-03-04

## 2024-03-04 NOTE — PATIENT INSTRUCTIONS
Electrophysiology Study and Atrial Fibrillation (AFib) Ablation    Date of Procedure:     Time of Arrival:     Cardiologist performing procedure: Dr. Muñoz    You will get a call from our  with the date and time.    Do not eat or drink anything after midnight the night before the procedure.      You may brush your teeth and rinse the morning of the procedure.    Please hold amiodarone for 2 weeks prior to procedure.    Do NOT stop taking Eliquis (any blood thinners) leading up to the procedure.  However, do not take any blood thinners the morning of the procedure.    Take all your other routine medications the morning of the procedure with the following exceptions:  If you take a blood thinner, please HOLD on the day of the procedure.  Hold any other medications as instructed above.    Do not apply any lotion, powder, or deodorant the morning of the procedure.    Please bring a list of your medications to the hospital with you.    CT scan of the chest is due within a week of the procedure. Someone will call you with the date/time of the CT scan.       Lab work is due within a week of the procedure (can be done the same day as the CT scan). You do not need to be fasting for these labs. This can be done at the [facility]patient lab at [address].    You must have someone available to drive you home the same day.  It is recommended that you do not drive for 48 hours after the procedure.  You will also need someone with you at home the night of the procedure.    If you are unable to make this appointment, [facility], at [phone].

## 2024-03-04 NOTE — H&P (VIEW-ONLY)
hemoptysis, pleuritic pain, SOB, sputum changes or wheezing  Cardiovascular ROS: Per HPI.   Gastrointestinal ROS: negative for - abdominal pain, blood in stools, diarrhea, hematemesis, melena, nausea/vomiting or swallowing difficulty/pain  Genito-Urinary ROS: negative for - dysuria or incontinence  Musculoskeletal ROS: negative for - joint swelling or muscle pain  Neurological ROS: negative for - confusion, dizziness, gait disturbance, headaches, numbness/tingling, seizures, speech problems, tremors, visual changes or weakness  Dermatological ROS: negative for - rash    Physical Examination:  Vitals:    03/04/24 1518   BP: (!) 160/100   Pulse: (!) 106         Constitutional: Oriented. No distress.   Head: Normocephalic and atraumatic.   Mouth/Throat: Oropharynx is clear and moist.   Eyes: Conjunctivae normal. EOM are normal.   Neck: Normal range of motion. Neck supple. No rigidity.  No JVD present.   Cardiovascular: Normal rate, regular rhythm, S1&S2 and intact distal pulses.   Pulmonary/Chest: Bilateral respiratory sounds. No wheezes. No rhonchi.    Abdominal: Soft. Bowel sounds present. No distension, No tenderness.   Musculoskeletal: No tenderness. No edema    Lymphadenopathy: Has no cervical adenopathy.   Neurological: Alert and oriented. Cranial nerve appears intact, No Gross deficit   Skin: Skin is warm and dry. No rash noted.   Psychiatric: Has a normal mood, affect and behavior     Labs:  Reviewed.     ECG: reviewed,     Studies:   1. 13 day CAM (1/22-2/4/24): 100% AF with average HR 99 (), 3 runs of NSVT lasting up to 5 beats, <1% PVC, symptoms correlating with AF    2. Echo 2/19/24:  Summary   Left ventricular cavity size is normal.   There is mild concentric left ventricular hypertrophy.   Left ventricular function is reduced with ejection fraction estimated at   30-35%.   Diffuse left ventricular hypokinesis.   Diastolic function cannot be assessed due to an arrhythmia.   Mitral annular

## 2024-03-04 NOTE — PROGRESS NOTES
100 MG tablet Take 1 tablet by mouth daily 2/29/24  Yes Norm De Leon MD   amLODIPine (NORVASC) 2.5 MG tablet Take 1 tablet by mouth daily 2/29/24  Yes Norm De Leon MD   metoprolol tartrate (LOPRESSOR) 50 MG tablet Take 1 tablet by mouth 2 times daily 2/26/24  Yes Merary Euceda MD   apixaban (ELIQUIS) 5 MG TABS tablet Take 1 tablet by mouth 2 times daily 2/8/24  Yes Nahun Moreno MD   famotidine (PEPCID) 20 MG tablet TAKE 1 TABLET BY MOUTH TWICE A DAY 4/22/23  Yes Bharath Yadav MD   mycophenolate (CELLCEPT) 500 MG tablet TAKE 2 TABLETS BY MOUTH TWICE A DAY 2/14/23  Yes Corin Meyer MD   B COMPLEX-C PO Take by mouth   Yes David Ford MD   Multiple Minerals-Vitamins (CALCIUM-MAGNESIUM-ZINC-D3 PO) Take by mouth   Yes David Ford MD   Nutritional Supplements (VITAMIN D BOOSTER PO) Take by mouth   Yes ProviderDavid MD       Social History:   reports that she quit smoking about 18 years ago. Her smoking use included cigarettes. She started smoking about 33 years ago. She has a 30.0 pack-year smoking history. She has never used smokeless tobacco. She reports current alcohol use. She reports that she does not use drugs.        Family History:  family history includes Breast Cancer (age of onset: 48) in her mother.   Reviewed. Denies family history of sudden cardiac death, arrhythmia, premature CAD    Review of System:    General ROS: negative for - chills, fever   Psychological ROS: negative for - anxiety or depression  Ophthalmic ROS: negative for - eye pain or loss of vision  ENT ROS: negative for - epistaxis, headaches, nasal discharge, sore throat   Allergy and Immunology ROS: negative for - hives, nasal congestion   Hematological and Lymphatic ROS: negative for - bleeding problems, blood clots, bruising or jaundice  Endocrine ROS: negative for - skin changes, temperature intolerance or unexpected weight changes  Respiratory ROS: negative for - cough,

## 2024-03-05 ENCOUNTER — TELEPHONE (OUTPATIENT)
Dept: CARDIOLOGY CLINIC | Age: 69
End: 2024-03-05

## 2024-03-05 NOTE — TELEPHONE ENCOUNTER
Please schedule pt for AF ablation with [facility] at [facility]. EP form started. Instructions reviewed with pt during OV today. Please schedule pulmonary CTA at [facility].

## 2024-03-05 NOTE — TELEPHONE ENCOUNTER
[facility] pharmacy called stating there's a drug interaction with the patient's amiodarone & citalopram medications. Please call [facility] to clarify if the patient is suppose to be taking both.     Pharmacy    [facility] [address] [facility] [employer] - [address] - P [phone] - F [phone]  [address] [place], [place] [id number]  Phone: [phone]  Fax: [phone]

## 2024-03-06 ENCOUNTER — PREP FOR PROCEDURE (OUTPATIENT)
Dept: CARDIOLOGY CLINIC | Age: 69
End: 2024-03-06

## 2024-03-06 RX ORDER — SODIUM CHLORIDE 9 MG/ML
INJECTION, SOLUTION INTRAVENOUS PRN
Status: CANCELLED | OUTPATIENT
Start: 2024-03-27

## 2024-03-06 RX ORDER — SODIUM CHLORIDE 0.9 % (FLUSH) 0.9 %
5-40 SYRINGE (ML) INJECTION EVERY 12 HOURS SCHEDULED
Status: CANCELLED | OUTPATIENT
Start: 2024-03-27

## 2024-03-06 RX ORDER — SODIUM CHLORIDE 0.9 % (FLUSH) 0.9 %
5-40 SYRINGE (ML) INJECTION PRN
Status: CANCELLED | OUTPATIENT
Start: 2024-03-27

## 2024-03-06 NOTE — TELEPHONE ENCOUNTER
Pt reported at OV yesterday that she never started citalopram, prescribed by PCP. She confirmed this again per [email or web address] message today. Attempted to call [facility], but was on hold for 24 min waiting to speak with a pharmacist. Called back and left detailed VM asking them to fill the amiodarone as the pt never took citalopram.

## 2024-03-06 NOTE — TELEPHONE ENCOUNTER
Spoke with the pt and got her scheduled for testing and procedure. We went over instructions below and she verbalized understanding. I will send to [facility] also.       CTA - @ [facility]   Date: 3/27/24  Arrival time: 9:30 am   Procedure time: 10:00 am     Prep instructions: Nothing to eat/drink 4 hours prior, No Caffeine 12 hours prior, bring ins card and ID. Check in at outpatient desk on 1st floor.       Electrophysiology Study and Atrial Fibrillation (AFib) Ablation     Date of Procedure: 4/3/24     Time of Arrival: 10:00 am   Procedure time: 12:00 pm      Cardiologist performing procedure: Dr. Muñoz     You will get a call from our  with the date and time.     Do not eat or drink anything after midnight the night before the procedure.       You may brush your teeth and rinse the morning of the procedure.     Please hold amiodarone for 2 weeks prior to procedure.     Do NOT stop taking Eliquis (any blood thinners) leading up to the procedure.  However, do not take any blood thinners the morning of the procedure.     Take all your other routine medications the morning of the procedure with the following exceptions:  If you take a blood thinner, please HOLD on the day of the procedure.  Hold any other medications as instructed above.     Do not apply any lotion, powder, or deodorant the morning of the procedure.     Please bring a list of your medications to the hospital with you.     CT scan of the chest is due within a week of the procedure. Someone will call you with the date/time of the CT scan.        Lab work is due within a week of the procedure (can be done the same day as the CT scan). You do not need to be fasting for these labs. This can be done at the [facility]patient lab at [address]     You must have someone available to drive you home the same day.  It is recommended that you do not drive for 48 hours after the procedure.  You will also need someone with you at home the

## 2024-03-07 ENCOUNTER — CARE COORDINATION (OUTPATIENT)
Dept: CASE MANAGEMENT | Age: 69
End: 2024-03-07

## 2024-03-07 NOTE — CARE COORDINATION
Care Transitions Follow Up Call    Patient Current Location:  Home: [address] [place] [address] [place] [address]    Care Transition Nurse contacted the patient by telephone to follow up after admission on 2024.  Verified name and  with patient as identifiers.    Patient: Elo Paul  Patient : 1955   MRN: [id number]   Reason for Admission: A-fib with RVR  Discharge Date: 24 RARS: Readmission Risk Score: 5.3      Needs to be reviewed by the provider   Additional needs identified to be addressed with provider: No  none             Method of communication with provider: none.    CTN spoke with patient this afternoon for follow up CTN call.   Patient states she is doing well, states she is not having any fever, chills, nausea, vomiting, chest pain, SOB or cough.   Patient with no congestion, pain, difficulty emptying bladder, LE edema, feeling lightheaded, dizziness, and heart palpitations.  Patient states she is babysitting grandchildren today.  Patient has had HFU with PCP and Cardiologist, started on Celexa by PCP, but hasn't started, was also started on Amiodarone 200 mg PO Daily, by Cardiologist.  No other issues or concerns, no new or changed medications at this time.      Addressed changes since last contact:  none  Discussed follow-up appointments. If no appointment was previously scheduled, appointment scheduling offered: Yes.   Is follow up appointment scheduled within 7 days of discharge? Yes.    Follow Up  Future Appointments   Date Time Provider Department Center   3/18/2024  9:30 AM Merary Euceda MD [facility]   3/27/2024 10:00 AM [facility] CT RM 1 [facility] CT [employer]   4/3/2024 12:00 PM [facility] CARDIAC CATH LAB ROOM 3 [facility] CATH [facility]   4/10/2024 10:30 AM Rhea Fung APRN - NP [facility]   2024  9:30 AM Johny Gonzalez MD [facility] [address] [place] - [facility]   2024  1:00 PM Keven Miller MD [facility]   2024  1:15 PM Nahun Moreno MD

## 2024-03-14 ENCOUNTER — CARE COORDINATION (OUTPATIENT)
Dept: CASE MANAGEMENT | Age: 69
End: 2024-03-14

## 2024-03-14 NOTE — CARE COORDINATION
and/or understanding of plan of care after discharge. Discussed appropriate site of care based on symptoms and resources available to patient including: PCP  Specialist  Urgent care clinics  When to call 911. The patient agrees to contact the PCP office for questions related to their healthcare.     Advance Care Planning:   not on file.     Patients top risk factors for readmission: medical condition-A-fib with RVR   Interventions to address risk factors: Education of patient/family/caregiver/guardian to support self-management-Patient instructed to continue to monitor for any returning dizziness or lightheadedness, other s/s listed above, reporting to MD immediately.    Care Transitions Subsequent and Final Call    Schedule Follow Up Appointment with PCP: Declined  Subsequent and Final Calls  Do you have any ongoing symptoms?: No  Have your medications changed?: No  Do you have any questions related to your medications?: No  Do you currently have any active services?: No  Do you have any needs or concerns that I can assist you with?: No  Identified Barriers: None  Care Transitions Interventions  No Identified Needs  Other Interventions:           Care Transition Nurse provided contact information for future needs. Plan for follow-up call in 5-7 days based on severity of symptoms and risk factors.  Plan for next call: symptom management-Any returning heart palpitations, other issues or concerns. If no issues, CTN will close out CTN episode.  medication management-Adjusting to Amiodarone dose?    Thank You,    Puja Welch RN  Care Transition Coordinator  Contact Number:[phone]

## 2024-03-18 ENCOUNTER — OFFICE VISIT (OUTPATIENT)
Dept: CARDIOLOGY CLINIC | Age: 69
End: 2024-03-18
Payer: MEDICARE

## 2024-03-18 VITALS
HEART RATE: 68 BPM | WEIGHT: 222 LBS | SYSTOLIC BLOOD PRESSURE: 138 MMHG | BODY MASS INDEX: 41.95 KG/M2 | DIASTOLIC BLOOD PRESSURE: 80 MMHG

## 2024-03-18 DIAGNOSIS — I25.10 CORONARY ARTERY DISEASE INVOLVING NATIVE CORONARY ARTERY OF NATIVE HEART WITHOUT ANGINA PECTORIS: ICD-10-CM

## 2024-03-18 DIAGNOSIS — I50.20 HFREF (HEART FAILURE WITH REDUCED EJECTION FRACTION) (HCC): Primary | ICD-10-CM

## 2024-03-18 DIAGNOSIS — I42.8 NICM (NONISCHEMIC CARDIOMYOPATHY) (HCC): ICD-10-CM

## 2024-03-18 DIAGNOSIS — E78.2 MIXED HYPERLIPIDEMIA: ICD-10-CM

## 2024-03-18 DIAGNOSIS — I48.19 PERSISTENT ATRIAL FIBRILLATION (HCC): ICD-10-CM

## 2024-03-18 DIAGNOSIS — I10 BENIGN ESSENTIAL HTN: ICD-10-CM

## 2024-03-18 PROCEDURE — 99215 OFFICE O/P EST HI 40 MIN: CPT | Performed by: NURSE PRACTITIONER

## 2024-03-18 PROCEDURE — 1123F ACP DISCUSS/DSCN MKR DOCD: CPT | Performed by: NURSE PRACTITIONER

## 2024-03-18 PROCEDURE — 3079F DIAST BP 80-89 MM HG: CPT | Performed by: NURSE PRACTITIONER

## 2024-03-18 PROCEDURE — 3075F SYST BP GE 130 - 139MM HG: CPT | Performed by: NURSE PRACTITIONER

## 2024-03-18 RX ORDER — AMLODIPINE BESYLATE 2.5 MG/1
2.5 TABLET ORAL DAILY
Qty: 90 TABLET | Refills: 3 | Status: SHIPPED | OUTPATIENT
Start: 2024-03-18 | End: 2024-03-22 | Stop reason: SDUPTHER

## 2024-03-18 RX ORDER — FUROSEMIDE 20 MG/1
20 TABLET ORAL DAILY
Qty: 90 TABLET | Refills: 3 | Status: SHIPPED | OUTPATIENT
Start: 2024-03-18

## 2024-03-18 NOTE — PROGRESS NOTES
[facility]  [address]  [phone]    CC CHF/CAD/HTN/HLD    HPI:  69 y.o. patient of Kristy Euceda and Toni here for  chronic HFrEF (NICM EF 30-35%) moderate CAD, persistent AF, HTN, HLD and DARIEN. She notes increase in LE edema, mild orthopnea and SOB. She always has a decreased appetite. Her arms and tips of fingers will feel cold in the afternoon.     No c/o cp, LH/dizziness, palpitations, syncope or falls. No n/v/d, fever or GI/ bleeding.       Past Medical History:   Diagnosis Date    Allergic rhinitis     Arthritis     Benign essential HTN 2019    Fracture of nasal bone     GERD (gastroesophageal reflux disease)     Headache     Nosebleed     Polymyositis (HCC) 2019    Raynaud's disease without gangrene 2019     Past Surgical History:   Procedure Laterality Date    BREAST BIOPSY      benign    MUSCLE BIOPSY Left 10/08/2019    LEFT DELTOID MUSCLE BIOPSY, LEFT QUADRICEP MUSCLE BIOPSY performed by Lucho Genao DO at [facility] OR     Family History   Problem Relation Age of Onset    Breast Cancer Mother 48     Social History     Tobacco Use    Smoking status: Former     Current packs/day: 0.00     Average packs/day: 2.0 packs/day for 15.0 years (30.0 ttl pk-yrs)     Types: Cigarettes     Start date: 3/20/1990     Quit date: 3/20/2005     Years since quittin.0    Smokeless tobacco: Never   Vaping Use    Vaping Use: Never used   Substance Use Topics    Alcohol use: Yes     Comment: 2 per night    Drug use: Never     Allergies:Patient has no known allergies.    Review of Systems  All 12 point review of symptoms completed. Pertinent positives identified in the HPI, all other review of symptoms negative.    Physical Exam:  /80   Pulse 68 Comment: irregular  Wt 100.7 kg (222 lb)   BMI 41.95 kg/m²    General (appearance):  No acute distress  Eyes: anicteric   Neck: soft, No JVD  Ears/Nose/Mouth/Thorat: No cyanosis  CV: irreg, irreg  Respiratory:  clear  GI:

## 2024-03-19 ENCOUNTER — CARE COORDINATION (OUTPATIENT)
Dept: CASE MANAGEMENT | Age: 69
End: 2024-03-19

## 2024-03-19 NOTE — CARE COORDINATION
Care Transitions Follow Up Call    Patient Current Location:  Home: [address] [place] [address] [place] [address]    Care Transition Nurse contacted the patient by telephone to follow up after admission on 2024.  Verified name and  with patient as identifiers.    Patient: Elo Paul  Patient : 1955   MRN: [id number]   Reason for Admission: A-Fib with RVR  Discharge Date: 24 RARS: Readmission Risk Score: 5.3      Needs to be reviewed by the provider   Additional needs identified to be addressed with provider: No  none             Method of communication with provider: none.    CTN spoke with patient this afternoon for final follow up CTN call.  Patient states she is doing better.  States she is no longer feeling weak or out of sorts, due to amiodarone medication.  Patient states she was taking all other BP medications along with Amiodarone, is now taking amiodarone about 1 hour or so after other medications, and is feeling better.  No reports of any fever, chills, nausea, vomiting, chest pain, SOB or cough.   Patient with no congestion, pain, difficulty emptying bladder, feeling lightheaded, dizziness, and heart palpitations.   Patient had HFU with Cardiologist on 2024, started on Lasix 20 mg PO Daily, states she is tolerating this medication well.  Patient does report some slight LE Edema, feet and ankles only, minimal.  No weight gain, no other issues or concerns, new or changed medications at this time.    Addressed changes since last contact:  none  Discussed follow-up appointments. If no appointment was previously scheduled, appointment scheduling offered: Yes.   Is follow up appointment scheduled within 7 days of discharge? Yes.    Follow Up  Future Appointments   Date Time Provider Department Center   3/27/2024 10:00 AM [facility] CT RM 1 [facility]   4/3/2024 12:00 PM [facility] CARDIAC CATH LAB ROOM 3 [facility] CATH [facility]   4/10/2024 10:30 AM Rhea Fung, APRN - [facility]

## 2024-03-27 ENCOUNTER — HOSPITAL ENCOUNTER (OUTPATIENT)
Dept: CT IMAGING | Age: 69
Discharge: HOME | End: 2024-03-27
Payer: MEDICARE

## 2024-03-27 DIAGNOSIS — I48.19 PERSISTENT ATRIAL FIBRILLATION (HCC): ICD-10-CM

## 2024-03-27 LAB
ANION GAP SERPL CALCULATED.3IONS-SCNC: 15 MMOL/L (ref 3–16)
BUN SERPL-MCNC: 13 MG/DL (ref 7–20)
CALCIUM SERPL-MCNC: 9.3 MG/DL (ref 8.3–10.6)
CHLORIDE SERPL-SCNC: 95 MMOL/L (ref 99–110)
CO2 SERPL-SCNC: 24 MMOL/L (ref 21–32)
CREAT SERPL-MCNC: 0.7 MG/DL (ref 0.6–1.2)
DEPRECATED RDW RBC AUTO: 15.2 % (ref 12.4–15.4)
GFR SERPLBLD CREATININE-BSD FMLA CKD-EPI: >90 ML/MIN/{1.73_M2}
GLUCOSE SERPL-MCNC: 113 MG/DL (ref 70–99)
HCT VFR BLD AUTO: 41.2 % (ref 36–48)
HGB BLD-MCNC: 13.9 G/DL (ref 12–16)
INR PPP: 1.2 (ref 0.84–1.16)
MCH RBC QN AUTO: 29.1 PG (ref 26–34)
MCHC RBC AUTO-ENTMCNC: 33.8 G/DL (ref 31–36)
MCV RBC AUTO: 85.9 FL (ref 80–100)
PLATELET # BLD AUTO: 270 K/UL (ref 135–450)
PMV BLD AUTO: 8.7 FL (ref 5–10.5)
POTASSIUM SERPL-SCNC: 4.1 MMOL/L (ref 3.5–5.1)
PROTHROMBIN TIME: 15.2 SEC (ref 11.5–14.8)
RBC # BLD AUTO: 4.79 M/UL (ref 4–5.2)
SODIUM SERPL-SCNC: 134 MMOL/L (ref 136–145)
WBC # BLD AUTO: 7.1 K/UL (ref 4–11)

## 2024-03-27 PROCEDURE — 71275 CT ANGIOGRAPHY CHEST: CPT

## 2024-03-27 PROCEDURE — 6360000004 HC RX CONTRAST MEDICATION: Performed by: INTERNAL MEDICINE

## 2024-03-27 RX ADMIN — IOPAMIDOL 75 ML: 755 INJECTION, SOLUTION INTRAVENOUS at 09:36

## 2024-04-03 ENCOUNTER — HOSPITAL ENCOUNTER (OUTPATIENT)
Dept: CARDIAC CATH/INVASIVE PROCEDURES | Age: 69
Discharge: HOME | End: 2024-04-03
Attending: INTERNAL MEDICINE | Admitting: INTERNAL MEDICINE
Payer: MEDICARE

## 2024-04-03 ENCOUNTER — ANESTHESIA (OUTPATIENT)
Dept: CARDIAC CATH/INVASIVE PROCEDURES | Age: 69
End: 2024-04-03
Payer: MEDICARE

## 2024-04-03 ENCOUNTER — ANESTHESIA EVENT (OUTPATIENT)
Dept: CARDIAC CATH/INVASIVE PROCEDURES | Age: 69
End: 2024-04-03
Payer: MEDICARE

## 2024-04-03 VITALS
TEMPERATURE: 97.5 F | HEIGHT: 61 IN | OXYGEN SATURATION: 97 % | DIASTOLIC BLOOD PRESSURE: 74 MMHG | RESPIRATION RATE: 16 BRPM | WEIGHT: 217 LBS | HEART RATE: 80 BPM | SYSTOLIC BLOOD PRESSURE: 139 MMHG | BODY MASS INDEX: 40.97 KG/M2

## 2024-04-03 DIAGNOSIS — I48.19 PERSISTENT ATRIAL FIBRILLATION (HCC): ICD-10-CM

## 2024-04-03 DIAGNOSIS — R94.39 ABNORMAL STRESS TEST: ICD-10-CM

## 2024-04-03 DIAGNOSIS — I10 BENIGN ESSENTIAL HTN: ICD-10-CM

## 2024-04-03 LAB
ABO + RH BLD: NORMAL
ANTIBODY SCREEN: NORMAL
EKG ATRIAL RATE: 110 BPM
EKG DIAGNOSIS: NORMAL
EKG Q-T INTERVAL: 334 MS
EKG QRS DURATION: 106 MS
EKG QTC CALCULATION (BAZETT): 437 MS
EKG R AXIS: -13 DEGREES
EKG T AXIS: 53 DEGREES
EKG VENTRICULAR RATE: 103 BPM
POC ACT LR: 296 SEC
POC ACT LR: 327 SEC
POC ACT LR: 348 SEC
POC ACT LR: 349 SEC
POC ACT LR: 376 SEC
POC ACT LR: 383 SEC
POC ACT LR: >400 SEC

## 2024-04-03 PROCEDURE — 2500000003 HC RX 250 WO HCPCS: Performed by: NURSE ANESTHETIST, CERTIFIED REGISTERED

## 2024-04-03 PROCEDURE — 93005 ELECTROCARDIOGRAM TRACING: CPT | Performed by: INTERNAL MEDICINE

## 2024-04-03 PROCEDURE — 6360000002 HC RX W HCPCS: Performed by: NURSE ANESTHETIST, CERTIFIED REGISTERED

## 2024-04-03 PROCEDURE — 7100000001 HC PACU RECOVERY - ADDTL 15 MIN: Performed by: ANESTHESIOLOGY

## 2024-04-03 PROCEDURE — 3700000000 HC ANESTHESIA ATTENDED CARE: Performed by: ANESTHESIOLOGY

## 2024-04-03 PROCEDURE — C1760 CLOSURE DEV, VASC: HCPCS | Performed by: INTERNAL MEDICINE

## 2024-04-03 PROCEDURE — 86850 RBC ANTIBODY SCREEN: CPT

## 2024-04-03 PROCEDURE — C1732 CATH, EP, DIAG/ABL, 3D/VECT: HCPCS | Performed by: INTERNAL MEDICINE

## 2024-04-03 PROCEDURE — 2580000003 HC RX 258: Performed by: INTERNAL MEDICINE

## 2024-04-03 PROCEDURE — 86901 BLOOD TYPING SEROLOGIC RH(D): CPT

## 2024-04-03 PROCEDURE — 2500000003 HC RX 250 WO HCPCS

## 2024-04-03 PROCEDURE — 93656 COMPRE EP EVAL ABLTJ ATR FIB: CPT

## 2024-04-03 PROCEDURE — 93623 PRGRMD STIMJ&PACG IV RX NFS: CPT | Performed by: INTERNAL MEDICINE

## 2024-04-03 PROCEDURE — 93657 TX L/R ATRIAL FIB ADDL: CPT | Performed by: INTERNAL MEDICINE

## 2024-04-03 PROCEDURE — C1766 INTRO/SHEATH,STRBLE,NON-PEEL: HCPCS | Performed by: INTERNAL MEDICINE

## 2024-04-03 PROCEDURE — 93657 TX L/R ATRIAL FIB ADDL: CPT

## 2024-04-03 PROCEDURE — C1759 CATH, INTRA ECHOCARDIOGRAPHY: HCPCS | Performed by: INTERNAL MEDICINE

## 2024-04-03 PROCEDURE — 2580000003 HC RX 258: Performed by: NURSE ANESTHETIST, CERTIFIED REGISTERED

## 2024-04-03 PROCEDURE — 93010 ELECTROCARDIOGRAM REPORT: CPT | Performed by: STUDENT IN AN ORGANIZED HEALTH CARE EDUCATION/TRAINING PROGRAM

## 2024-04-03 PROCEDURE — G0269 OCCLUSIVE DEVICE IN VEIN ART: HCPCS

## 2024-04-03 PROCEDURE — 6360000002 HC RX W HCPCS

## 2024-04-03 PROCEDURE — 6370000000 HC RX 637 (ALT 250 FOR IP): Performed by: INTERNAL MEDICINE

## 2024-04-03 PROCEDURE — 93655 ICAR CATH ABLTJ DSCRT ARRHYT: CPT

## 2024-04-03 PROCEDURE — C1894 INTRO/SHEATH, NON-LASER: HCPCS | Performed by: INTERNAL MEDICINE

## 2024-04-03 PROCEDURE — 93655 ICAR CATH ABLTJ DSCRT ARRHYT: CPT | Performed by: INTERNAL MEDICINE

## 2024-04-03 PROCEDURE — 93656 COMPRE EP EVAL ABLTJ ATR FIB: CPT | Performed by: INTERNAL MEDICINE

## 2024-04-03 PROCEDURE — 2709999900 HC NON-CHARGEABLE SUPPLY: Performed by: INTERNAL MEDICINE

## 2024-04-03 PROCEDURE — 7100000010 HC PHASE II RECOVERY - FIRST 15 MIN: Performed by: ANESTHESIOLOGY

## 2024-04-03 PROCEDURE — 7100000011 HC PHASE II RECOVERY - ADDTL 15 MIN: Performed by: ANESTHESIOLOGY

## 2024-04-03 PROCEDURE — 93622 COMP EP EVAL L VENTR PAC&REC: CPT | Performed by: INTERNAL MEDICINE

## 2024-04-03 PROCEDURE — 85347 COAGULATION TIME ACTIVATED: CPT

## 2024-04-03 PROCEDURE — 3700000001 HC ADD 15 MINUTES (ANESTHESIA): Performed by: ANESTHESIOLOGY

## 2024-04-03 PROCEDURE — 7100000000 HC PACU RECOVERY - FIRST 15 MIN: Performed by: ANESTHESIOLOGY

## 2024-04-03 PROCEDURE — 2720000010 HC SURG SUPPLY STERILE: Performed by: INTERNAL MEDICINE

## 2024-04-03 PROCEDURE — 86900 BLOOD TYPING SEROLOGIC ABO: CPT

## 2024-04-03 RX ORDER — HYDROMORPHONE HYDROCHLORIDE 2 MG/ML
0.5 INJECTION, SOLUTION INTRAMUSCULAR; INTRAVENOUS; SUBCUTANEOUS EVERY 5 MIN PRN
Status: DISCONTINUED | OUTPATIENT
Start: 2024-04-03 | End: 2024-04-04 | Stop reason: HOSPADM

## 2024-04-03 RX ORDER — OXYCODONE HYDROCHLORIDE 5 MG/1
5 TABLET ORAL PRN
Status: ACTIVE | OUTPATIENT
Start: 2024-04-03 | End: 2024-04-03

## 2024-04-03 RX ORDER — NALOXONE HYDROCHLORIDE 0.4 MG/ML
INJECTION, SOLUTION INTRAMUSCULAR; INTRAVENOUS; SUBCUTANEOUS PRN
Status: DISCONTINUED | OUTPATIENT
Start: 2024-04-03 | End: 2024-04-04 | Stop reason: HOSPADM

## 2024-04-03 RX ORDER — OXYCODONE HYDROCHLORIDE 5 MG/1
10 TABLET ORAL PRN
Status: ACTIVE | OUTPATIENT
Start: 2024-04-03 | End: 2024-04-03

## 2024-04-03 RX ORDER — DEXAMETHASONE SODIUM PHOSPHATE 4 MG/ML
INJECTION, SOLUTION INTRA-ARTICULAR; INTRALESIONAL; INTRAMUSCULAR; INTRAVENOUS; SOFT TISSUE PRN
Status: DISCONTINUED | OUTPATIENT
Start: 2024-04-03 | End: 2024-04-03 | Stop reason: SDUPTHER

## 2024-04-03 RX ORDER — METOPROLOL TARTRATE 50 MG/1
25 TABLET, FILM COATED ORAL 2 TIMES DAILY
Qty: 60 TABLET | Refills: 5 | Status: SHIPPED | OUTPATIENT
Start: 2024-04-03 | End: 2024-04-04 | Stop reason: SDUPTHER

## 2024-04-03 RX ORDER — SODIUM CHLORIDE 9 MG/ML
INJECTION, SOLUTION INTRAVENOUS PRN
Status: DISCONTINUED | OUTPATIENT
Start: 2024-04-03 | End: 2024-04-04 | Stop reason: HOSPADM

## 2024-04-03 RX ORDER — LIDOCAINE HYDROCHLORIDE 20 MG/ML
INJECTION, SOLUTION INFILTRATION; PERINEURAL PRN
Status: DISCONTINUED | OUTPATIENT
Start: 2024-04-03 | End: 2024-04-03 | Stop reason: SDUPTHER

## 2024-04-03 RX ORDER — ROCURONIUM BROMIDE 10 MG/ML
INJECTION, SOLUTION INTRAVENOUS PRN
Status: DISCONTINUED | OUTPATIENT
Start: 2024-04-03 | End: 2024-04-03 | Stop reason: SDUPTHER

## 2024-04-03 RX ORDER — PROTAMINE SULFATE 10 MG/ML
INJECTION, SOLUTION INTRAVENOUS PRN
Status: DISCONTINUED | OUTPATIENT
Start: 2024-04-03 | End: 2024-04-03 | Stop reason: SDUPTHER

## 2024-04-03 RX ORDER — ACETAMINOPHEN 325 MG/1
650 TABLET ORAL EVERY 4 HOURS PRN
Status: DISCONTINUED | OUTPATIENT
Start: 2024-04-03 | End: 2024-04-04 | Stop reason: HOSPADM

## 2024-04-03 RX ORDER — GLYCOPYRROLATE 0.2 MG/ML
INJECTION INTRAMUSCULAR; INTRAVENOUS PRN
Status: DISCONTINUED | OUTPATIENT
Start: 2024-04-03 | End: 2024-04-03 | Stop reason: SDUPTHER

## 2024-04-03 RX ORDER — SODIUM CHLORIDE 0.9 % (FLUSH) 0.9 %
5-40 SYRINGE (ML) INJECTION EVERY 12 HOURS SCHEDULED
Status: DISCONTINUED | OUTPATIENT
Start: 2024-04-03 | End: 2024-04-04 | Stop reason: HOSPADM

## 2024-04-03 RX ORDER — FENTANYL CITRATE 50 UG/ML
INJECTION, SOLUTION INTRAMUSCULAR; INTRAVENOUS PRN
Status: DISCONTINUED | OUTPATIENT
Start: 2024-04-03 | End: 2024-04-03 | Stop reason: SDUPTHER

## 2024-04-03 RX ORDER — ONDANSETRON 2 MG/ML
4 INJECTION INTRAMUSCULAR; INTRAVENOUS
Status: DISCONTINUED | OUTPATIENT
Start: 2024-04-03 | End: 2024-04-04 | Stop reason: HOSPADM

## 2024-04-03 RX ORDER — HALOPERIDOL 5 MG/ML
1 INJECTION INTRAMUSCULAR
Status: DISCONTINUED | OUTPATIENT
Start: 2024-04-03 | End: 2024-04-04 | Stop reason: HOSPADM

## 2024-04-03 RX ORDER — HEPARIN SODIUM 10000 [USP'U]/100ML
INJECTION, SOLUTION INTRAVENOUS CONTINUOUS PRN
Status: DISCONTINUED | OUTPATIENT
Start: 2024-04-03 | End: 2024-04-03 | Stop reason: SDUPTHER

## 2024-04-03 RX ORDER — HYDROMORPHONE HYDROCHLORIDE 2 MG/ML
0.25 INJECTION, SOLUTION INTRAMUSCULAR; INTRAVENOUS; SUBCUTANEOUS EVERY 5 MIN PRN
Status: DISCONTINUED | OUTPATIENT
Start: 2024-04-03 | End: 2024-04-04 | Stop reason: HOSPADM

## 2024-04-03 RX ORDER — SODIUM CHLORIDE 0.9 % (FLUSH) 0.9 %
5-40 SYRINGE (ML) INJECTION PRN
Status: DISCONTINUED | OUTPATIENT
Start: 2024-04-03 | End: 2024-04-04 | Stop reason: HOSPADM

## 2024-04-03 RX ORDER — HYDRALAZINE HYDROCHLORIDE 20 MG/ML
10 INJECTION INTRAMUSCULAR; INTRAVENOUS
Status: DISCONTINUED | OUTPATIENT
Start: 2024-04-03 | End: 2024-04-04 | Stop reason: HOSPADM

## 2024-04-03 RX ORDER — FUROSEMIDE 10 MG/ML
INJECTION INTRAMUSCULAR; INTRAVENOUS PRN
Status: DISCONTINUED | OUTPATIENT
Start: 2024-04-03 | End: 2024-04-03 | Stop reason: SDUPTHER

## 2024-04-03 RX ORDER — ONDANSETRON 2 MG/ML
INJECTION INTRAMUSCULAR; INTRAVENOUS PRN
Status: DISCONTINUED | OUTPATIENT
Start: 2024-04-03 | End: 2024-04-03 | Stop reason: SDUPTHER

## 2024-04-03 RX ORDER — HEPARIN SODIUM 1000 [USP'U]/ML
INJECTION, SOLUTION INTRAVENOUS; SUBCUTANEOUS PRN
Status: DISCONTINUED | OUTPATIENT
Start: 2024-04-03 | End: 2024-04-03 | Stop reason: SDUPTHER

## 2024-04-03 RX ORDER — LABETALOL HYDROCHLORIDE 5 MG/ML
10 INJECTION, SOLUTION INTRAVENOUS
Status: DISCONTINUED | OUTPATIENT
Start: 2024-04-03 | End: 2024-04-04 | Stop reason: HOSPADM

## 2024-04-03 RX ORDER — DIPHENHYDRAMINE HYDROCHLORIDE 50 MG/ML
12.5 INJECTION INTRAMUSCULAR; INTRAVENOUS
Status: DISCONTINUED | OUTPATIENT
Start: 2024-04-03 | End: 2024-04-04 | Stop reason: HOSPADM

## 2024-04-03 RX ORDER — IPRATROPIUM BROMIDE AND ALBUTEROL SULFATE 2.5; .5 MG/3ML; MG/3ML
1 SOLUTION RESPIRATORY (INHALATION)
Status: DISCONTINUED | OUTPATIENT
Start: 2024-04-03 | End: 2024-04-04 | Stop reason: HOSPADM

## 2024-04-03 RX ORDER — ETOMIDATE 2 MG/ML
INJECTION INTRAVENOUS PRN
Status: DISCONTINUED | OUTPATIENT
Start: 2024-04-03 | End: 2024-04-03 | Stop reason: SDUPTHER

## 2024-04-03 RX ORDER — SODIUM CHLORIDE 9 MG/ML
INJECTION, SOLUTION INTRAVENOUS CONTINUOUS PRN
Status: DISCONTINUED | OUTPATIENT
Start: 2024-04-03 | End: 2024-04-03 | Stop reason: SDUPTHER

## 2024-04-03 RX ADMIN — HEPARIN SODIUM 8000 UNITS: 1000 INJECTION INTRAVENOUS; SUBCUTANEOUS at 12:15

## 2024-04-03 RX ADMIN — FUROSEMIDE 5 MG: 10 INJECTION, SOLUTION INTRAMUSCULAR; INTRAVENOUS at 12:45

## 2024-04-03 RX ADMIN — FENTANYL CITRATE 25 MCG: 50 INJECTION, SOLUTION INTRAMUSCULAR; INTRAVENOUS at 12:00

## 2024-04-03 RX ADMIN — FUROSEMIDE 5 MG: 10 INJECTION, SOLUTION INTRAMUSCULAR; INTRAVENOUS at 13:00

## 2024-04-03 RX ADMIN — PROTAMINE SULFATE 35 MG: 10 INJECTION, SOLUTION INTRAVENOUS at 14:36

## 2024-04-03 RX ADMIN — ONDANSETRON 4 MG: 2 INJECTION INTRAMUSCULAR; INTRAVENOUS at 11:51

## 2024-04-03 RX ADMIN — FENTANYL CITRATE 50 MCG: 50 INJECTION, SOLUTION INTRAMUSCULAR; INTRAVENOUS at 11:38

## 2024-04-03 RX ADMIN — SODIUM CHLORIDE: 9 INJECTION, SOLUTION INTRAVENOUS at 11:00

## 2024-04-03 RX ADMIN — SUGAMMADEX 200 MG: 100 INJECTION, SOLUTION INTRAVENOUS at 14:40

## 2024-04-03 RX ADMIN — DEXAMETHASONE SODIUM PHOSPHATE 10 MG: 4 INJECTION, SOLUTION INTRAMUSCULAR; INTRAVENOUS at 11:44

## 2024-04-03 RX ADMIN — LIDOCAINE HYDROCHLORIDE 100 MG: 20 INJECTION, SOLUTION INFILTRATION; PERINEURAL at 11:38

## 2024-04-03 RX ADMIN — FUROSEMIDE 5 MG: 10 INJECTION, SOLUTION INTRAMUSCULAR; INTRAVENOUS at 13:15

## 2024-04-03 RX ADMIN — HEPARIN SODIUM 1000 UNITS: 1000 INJECTION INTRAVENOUS; SUBCUTANEOUS at 12:32

## 2024-04-03 RX ADMIN — ETOMIDATE 20 MG: 20 INJECTION, SOLUTION INTRAVENOUS at 11:38

## 2024-04-03 RX ADMIN — PHENYLEPHRINE HYDROCHLORIDE 50 MCG: 10 INJECTION INTRAVENOUS at 11:54

## 2024-04-03 RX ADMIN — GLYCOPYRROLATE 0.2 MG: 0.2 INJECTION, SOLUTION INTRAMUSCULAR; INTRAVENOUS at 11:49

## 2024-04-03 RX ADMIN — APIXABAN 5 MG: 5 TABLET, FILM COATED ORAL at 17:59

## 2024-04-03 RX ADMIN — Medication 1000 UNITS/HR: at 12:34

## 2024-04-03 RX ADMIN — SODIUM CHLORIDE: 9 INJECTION, SOLUTION INTRAVENOUS at 14:46

## 2024-04-03 RX ADMIN — FUROSEMIDE 5 MG: 10 INJECTION, SOLUTION INTRAMUSCULAR; INTRAVENOUS at 13:30

## 2024-04-03 RX ADMIN — ROCURONIUM BROMIDE 50 MG: 10 INJECTION, SOLUTION INTRAVENOUS at 11:40

## 2024-04-03 RX ADMIN — FENTANYL CITRATE 25 MCG: 50 INJECTION, SOLUTION INTRAMUSCULAR; INTRAVENOUS at 12:07

## 2024-04-03 RX ADMIN — PHENYLEPHRINE HYDROCHLORIDE 50 MCG: 10 INJECTION INTRAVENOUS at 11:48

## 2024-04-03 RX ADMIN — PHENYLEPHRINE HYDROCHLORIDE 50 MCG/MIN: 10 INJECTION INTRAVENOUS at 11:48

## 2024-04-03 ASSESSMENT — ENCOUNTER SYMPTOMS: SHORTNESS OF BREATH: 1

## 2024-04-03 NOTE — ANESTHESIA POSTPROCEDURE EVALUATION
Department of Anesthesiology  Postprocedure Note    Patient: Elo Paul  MRN: [id number]  YOB: 1955  Date of evaluation: 4/3/2024    Procedure Summary       Date: 04/03/24 Room / Location: [facility] Cath Lab    Anesthesia Start: 1125 Anesthesia Stop:     Procedure: ABLATION Diagnosis: Other persistent atrial fibrillation    Scheduled Providers: Farooq Gregory MD Responsible Provider: Vargas Meléndez MD    Anesthesia Type: general ASA Status: 4            Anesthesia Type: No value filed.    Beth Phase I:      Beth Phase II:      Anesthesia Post Evaluation    Patient location during evaluation: PACU  Patient participation: complete - patient participated  Level of consciousness: awake  Airway patency: patent  Nausea & Vomiting: no vomiting and no nausea  Cardiovascular status: hemodynamically stable  Respiratory status: acceptable  Hydration status: stable  Multimodal analgesia pain management approach  Pain management: adequate    No notable events documented.

## 2024-04-03 NOTE — ANESTHESIA PRE PROCEDURE
Department of Anesthesiology  Preprocedure Note       Name:  Elo Paul   Age:  69 y.o.  :  1955                                          MRN:  [id number]         Date:  4/3/2024      Surgeon: * Surgery not found *    Procedure:     Medications prior to admission:   Prior to Admission medications    Medication Sig Start Date End Date Taking? Authorizing Provider   amLODIPine (NORVASC) 2.5 MG tablet Take 1 tablet by mouth daily 3/22/24   Orquidea Betancourt APRN - CNP   hydrALAZINE (APRESOLINE) 50 MG tablet Take 1 tablet by mouth every 8 hours 3/22/24   Orquidea Betancourt APRN - CNP   furosemide (LASIX) 20 MG tablet Take 1 tablet by mouth daily 3/18/24   Orquidea Betancourt APRN - CNP   amiodarone (CORDARONE) 200 MG tablet Take 1 tablet by mouth daily 3/4/24   Nahun Moreno MD   atorvastatin (LIPITOR) 20 MG tablet Take 1 tablet by mouth daily 3/1/24   Johny Gonzalez MD   losartan (COZAAR) 100 MG tablet Take 1 tablet by mouth daily 24   Norm De Leon MD   metoprolol tartrate (LOPRESSOR) 50 MG tablet Take 1 tablet by mouth 2 times daily 24   Merary Euceda MD   apixaban (ELIQUIS) 5 MG TABS tablet Take 1 tablet by mouth 2 times daily 24   Nahun Moreno MD   famotidine (PEPCID) 20 MG tablet TAKE 1 TABLET BY MOUTH TWICE A DAY 23   Bharath Yadav MD   mycophenolate (CELLCEPT) 500 MG tablet TAKE 2 TABLETS BY MOUTH TWICE A DAY 23   Corin Meyer MD   B COMPLEX-C PO Take by mouth    David Ford MD   Multiple Minerals-Vitamins (CALCIUM-MAGNESIUM-ZINC-D3 PO) Take by mouth    David Ford MD   Nutritional Supplements (VITAMIN D BOOSTER PO) Take by mouth    David Ford MD       Current medications:    Current Facility-Administered Medications   Medication Dose Route Frequency Provider Last Rate Last Admin    sodium chloride flush 0.9 % injection 5-40 mL  5-40 mL IntraVENous 2 times per day Nahun Moreno MD

## 2024-04-03 NOTE — PROGRESS NOTES
Pt ambulated around nursing unit, pt tolerated well, R groin remains CD&I- soft around site, elequis given per orders, discharge instructions discussed. Questions answered.

## 2024-04-03 NOTE — DISCHARGE INSTRUCTIONS
[facility]  Electrophysiology    Dr. Nahun Bennett, CNP  Rhea [place], CNP  Vonda Humphrey, RN  [phone]    Cardiac Catheter Ablation  Discharge Instructions      WHAT YOU SHOULD KNOW:   Your heart has a special electrical system built into it that controls your heart rhythm. Sometimes there is a problem with this electrical system in the heart muscle. This problem may cause an arrhythmia, or abnormal heart rhythm. If medicine does not correct the problem, or if you do not wish to take medicines long-term, you may need a cardiac ablation. An ablation may also be called a catheter ablation, or a radiofrequency ablation.    An ablation procedure is usually done at the same time as an electrophysiology study. This test is used to \"map out\" the electrical pathways in your heart that control your heart rhythm. This test helps your doctor find the exact spot where the ablation needs to be done. During an ablation, energy is sent through a special catheter to the area of your heart that has the electrical problem. This energy causes a tiny area of the heart muscle to scar, stopping the electrical problem and allowing your heart to beat regularly. Ask your caregiver for more information about your heart problem, and tests and treatments that may be done for it.       AFTER YOU LEAVE:     Home care:    For the next 24 hours please hold pressure to your groin when you cough, sneeze, or change positions.    Activity: After your ablation, rest for one to two days. Avoid using stairs for a few days. When you must use stairs, step up with the leg that was not used for the ablation. Straighten this leg to move the other leg up to the next step without putting stress on it.    No strenuous activity for 1 week, no lifting greater than 10lbs for 1 week.     Do not strain while having a bowel movement. If you are constipated, take an over-the-counter stool softener such as Metamucil or Citracel.

## 2024-04-03 NOTE — PROGRESS NOTES
Pt arrived from cath lab to PACU, awakens to voice, denies pain. VSS, O2 sats 99% on 6 L simple mask. Pt in NSR with HR in 70s. Right groin soft, dressing dry. Right pedal and posterior tibial pulse palpable, foot warm. [place] in place draining clear yellow urine. Will monitor.

## 2024-04-03 NOTE — PROCEDURES
[facility]     Electrophysiology Procedure Note       Date of Procedure: 4/3/2024  Patient's Name: Elo Paul  YOB: 1955   Medical Record Number: [id number]  Referring Physician: Bharath Yadav MD  Procedure Performed by: Molly Moreno MD    Procedure performed:  Electrophysiology study with radiofrequency ablation of atrial fibrillation and pulmonary vein isolation   Additional focal ablation for Atrial fibrillation, outside the pulmonary vein -interatrial septum, left atrial anterior wall, vein of Price region, floor of the left atrium, roofline and floor line for isolation of the posterior wall  Typical atrial flutter ablation  Focal atrial tachycardia from the base of the left atrial appendage  3-D electroanatomical mapping of the left atrium    Transseptal puncture through an intact septum under intracardiac ultrasound guidance   Intracardiac echocardiography.   Left ventricular pacing and recording  Drug infusion with an attempt to induce arrhythmia and pacing  Anesthesia: General anesthesia provided by the Anesthesia service  EBL < 30 cc      Indications for procedure: Symptomatic atrial fibrillation, failed antiarrhythmic therapy and cardioversion in the past     Elo Paul is a 69 y.o. female who has a history of persistent atrial fibrillation who is symptomatic with symptoms of dyspnea with minimal exertion and fatigue, possible arrhythmia and his cardiomyopathy, LV ejection fraction 30 to 35%, who has failed antiarrhythmic therapy in the past is now here for an ablation for persistent atrial fibrillation.     Details of Procedure:   The risks, benefits and alternatives of the ablation procedure were discussed with the patient. The risks including, but not limited to, the risks of bleeding, infection, radiation exposure, injury to vascular, cardiac and surrounding structures (including pneumothorax), stroke, cardiac perforation, tamponade, need

## 2024-04-03 NOTE — PROGRESS NOTES
PRE-PROCEDURE    DATE: 4/3/2024 ARRIVAL TO CATH LAB: 9:44 AM    ADMIT SOURCE: Outpatient    ID & ALLERGY BAND: On    CONSENT: Yes    NPO SINCE: Midnight    LABS/PREGNANCY TEST: N/A    PULSES: Right DP 2+  Right PT 1+  Left DP 2+  Left PT 1+    IV SITE : Started in left arm.  with fluids infusing at kvo 9:44 AM     EKG RHYTHM: Atrial Fibrillation

## 2024-04-03 NOTE — INTERVAL H&P NOTE
Update History & Physical    The patient's History and Physical of March 4, by me was reviewed with the patient and I examined the patient. There was no change. The surgical site was confirmed by the patient and me.     Plan: The risks, benefits, expected outcome, and alternative to the recommended procedure have been discussed with the patient. Patient understands and wants to proceed with the procedure.     Electronically signed by Nahun Moreno MD on 4/3/2024 at 11:31 AM

## 2024-04-03 NOTE — PROGRESS NOTES
Pt resting quietly in bed with eyes closed, awakens to voice, denies pain. VSS, O2 sats 98% on room air. Dressing to right groin dry and intact, groin soft, pedal and posterior tibial pulses palpable. Lizarraga remains in place draining clear yellow urine. Pt seen by anesthesia, phase 1 criteria met.

## 2024-04-03 NOTE — PROGRESS NOTES
Pt dressed was able to void, denies pain, pt discharged via wheelchair to car with instructions. Discussed med change with metoprolol, no complications, discharge complete

## 2024-04-04 ENCOUNTER — PATIENT MESSAGE (OUTPATIENT)
Dept: CARDIOLOGY CLINIC | Age: 69
End: 2024-04-04

## 2024-04-04 DIAGNOSIS — R94.39 ABNORMAL STRESS TEST: ICD-10-CM

## 2024-04-04 DIAGNOSIS — I10 BENIGN ESSENTIAL HTN: ICD-10-CM

## 2024-04-04 DIAGNOSIS — I48.19 PERSISTENT ATRIAL FIBRILLATION (HCC): ICD-10-CM

## 2024-04-04 NOTE — TELEPHONE ENCOUNTER
From: Elo Paul  To: Dr. Nahun Moreno  Sent: 4/4/2024 11:33 AM EDT  Subject: metoprolol dosage change    A new order for 25mg Metoprolol was put in yesterday at [facility] pharmacy. Unfortunately by the time I was released pharmacy had closed. Currently I am cutting my 50 mg in half, but am concerned if the dosage is always correct. Could you cancel the order from yesterday and call in 25mg metoprolol to the [facility] in [employer] on [address]?    I also want to thank you and your team for yesterday. I was so nervous about everything, but their explanation and positivity put me at ease. Thank you again.

## 2024-04-05 ENCOUNTER — TELEPHONE (OUTPATIENT)
Dept: CARDIOLOGY CLINIC | Age: 69
End: 2024-04-05

## 2024-04-05 RX ORDER — FAMOTIDINE 20 MG/1
TABLET, FILM COATED ORAL
Qty: 180 TABLET | Refills: 3 | Status: SHIPPED | OUTPATIENT
Start: 2024-04-05

## 2024-04-05 NOTE — TELEPHONE ENCOUNTER
Post Ablation/Device Follow up Phone Calls    Date of Procedure:4/3/24    Procedure:af ablation    Date of Call: 4/5/24  Catheter Ablation  Sore throat or difficulty swallowing? (This is normal for a couple of days post procedure - if it continues, we want to know). Very slightly sore throat, better than yesterday.    Ongoing CP? (This is normal for a couple of days post procedure - if it continues, we want to know)  no     Ongoing SOB? Any s/s of heart failure? Swelling in feet ankles or abdomen? Unable to lie flat in bed? SOB with exertion or all the time?  No     How does the groin look? Any swelling, bleeding or drainage? (A small lump is ok.) slight discomfort , no other issues    Remind no heavy lifting >10# for 7 days post procedure. aware    Make sure they miss NO dose of blood thinners if they are on one. aware    Make sure they filled any medications that were prescribed (Protonix, Lasix, flecainide etc.) aware     Remind of follow up appointment.  4/10/24    Device Implant    Is there any drainage, redness or warmth from the incision/dressing?    Remind to keep incision/dressing clean and dry for 1 week. Do not remove dressing. This will be done in the office at f/u.    Do you have a fever or chills?    If there was groin access is there any swelling, lumps, drainage or bleeding? (A small lump is ok.)    Post op pain? - Should be controlled with Tylenol and ice.     Remind to do no heavy lifting (>10#) with the affected arm. No raising affected arm above shoulder or repetitive movement. This should be for 4 weeks with new implants. ILRs and generator changes should be 1 week.     Remind of follow up appointment.     Resumption of OAC per Dr. Muñoz. (off 3-5 days post procedure unless h/o CVA or [id number] > 4 then start after procedure).

## 2024-04-09 NOTE — PROGRESS NOTES
[facility]   Electrophysiology  Office Visit  Date: 4/10/2024    Chief Complaint   Patient presents with    Atrial Fibrillation    Atrial Flutter    Tachycardia    Cardiomyopathy    Congestive Heart Failure    Hypertension     Follows with Dr. Euceda    Cardiac HX: Elo Paul is a 69 y.o. woman with a h/o HTN, Raynaud's disease, polymyositis, epistaxis, s/p R nare cauterization (1/22/2024), pt reported irregularity w/ heartbeat and occasional palpitations, 13 day CAM (1/2024) showed 100% AF/RVR avg HR 99 and NSVT, symptoms correlating w/ AF, NICMP, sCHF, HFrEF, Echo (2/19/2024) EF 30-35%, MPI (2/21/2024) abnormal, s/p LHC (2/26/2024) moderate CAD no intervention, s/p RFA/PVI of AF/tAFL/AT (4/3/2024, Dr. Muñoz).    Interval History/HPI: Patient is here for follow up for atrial fibrillation, atrial flutter, atrial tachycardia, sCHF and HTN. Patient had presented to the emergency room with epistaxis and while there she discussed with the ER doc about her fit bit had been telling her she was having irregular heart beats. Her PCP ordered a holter monitor to be placed. Patient wore a 13 day CAM (1/2024) showed 100% AF/RVR avg HR 99 and NSVT. She was placed on Eliquis. She had reported that her HR was elevated at night and that she gets short of breath with 1 flight of stairs. She complained of fatigue as well. Echo was done and showed EF to be 30-35% and LAE. Patient had abnormal MPI on 2/21/2024. Patient underwent LHC with Dr. Euceda on 2/26/2024 that showed moderate CAD. Started on amiodarone 3/5/2024. Patient underwent RFA/PVI of AF/tAFL/AT (4/3/2024, Dr. Muñoz).   Today she presents in NSR, with a heart rate of 71. She has not felt any breakthrough of his atrial fibrillation. She has not felt any heart racing, palpitations or irregular heartbeats. She has not had any episodes of dizziness. Her right groin is soft with no hematoma. She denies any chest discomfort postprocedure. She states that her

## 2024-04-10 ENCOUNTER — OFFICE VISIT (OUTPATIENT)
Dept: CARDIOLOGY CLINIC | Age: 69
End: 2024-04-10
Payer: MEDICARE

## 2024-04-10 VITALS
DIASTOLIC BLOOD PRESSURE: 70 MMHG | WEIGHT: 222.4 LBS | SYSTOLIC BLOOD PRESSURE: 128 MMHG | HEART RATE: 71 BPM | BODY MASS INDEX: 42.02 KG/M2

## 2024-04-10 DIAGNOSIS — Z79.01 ON CONTINUOUS ORAL ANTICOAGULATION: ICD-10-CM

## 2024-04-10 DIAGNOSIS — I50.20 HFREF (HEART FAILURE WITH REDUCED EJECTION FRACTION) (HCC): ICD-10-CM

## 2024-04-10 DIAGNOSIS — Z79.899 ON AMIODARONE THERAPY: ICD-10-CM

## 2024-04-10 DIAGNOSIS — I47.19 ATRIAL TACHYCARDIA (HCC): ICD-10-CM

## 2024-04-10 DIAGNOSIS — I10 BENIGN ESSENTIAL HTN: ICD-10-CM

## 2024-04-10 DIAGNOSIS — I50.22 CHRONIC SYSTOLIC CHF (CONGESTIVE HEART FAILURE) (HCC): ICD-10-CM

## 2024-04-10 DIAGNOSIS — I48.0 PAROXYSMAL ATRIAL FIBRILLATION (HCC): Primary | ICD-10-CM

## 2024-04-10 DIAGNOSIS — I48.3 TYPICAL ATRIAL FLUTTER (HCC): ICD-10-CM

## 2024-04-10 DIAGNOSIS — I42.8 NICM (NONISCHEMIC CARDIOMYOPATHY) (HCC): ICD-10-CM

## 2024-04-10 PROCEDURE — 3078F DIAST BP <80 MM HG: CPT

## 2024-04-10 PROCEDURE — 99215 OFFICE O/P EST HI 40 MIN: CPT

## 2024-04-10 PROCEDURE — 1123F ACP DISCUSS/DSCN MKR DOCD: CPT

## 2024-04-10 PROCEDURE — 93000 ELECTROCARDIOGRAM COMPLETE: CPT

## 2024-04-10 PROCEDURE — 3074F SYST BP LT 130 MM HG: CPT

## 2024-04-22 ENCOUNTER — OFFICE VISIT (OUTPATIENT)
Dept: CARDIOLOGY CLINIC | Age: 69
End: 2024-04-22
Payer: MEDICARE

## 2024-04-22 VITALS
BODY MASS INDEX: 42.21 KG/M2 | DIASTOLIC BLOOD PRESSURE: 60 MMHG | HEART RATE: 81 BPM | OXYGEN SATURATION: 99 % | WEIGHT: 223.4 LBS | SYSTOLIC BLOOD PRESSURE: 132 MMHG

## 2024-04-22 DIAGNOSIS — G47.33 OSA (OBSTRUCTIVE SLEEP APNEA): ICD-10-CM

## 2024-04-22 DIAGNOSIS — E78.2 MIXED HYPERLIPIDEMIA: ICD-10-CM

## 2024-04-22 DIAGNOSIS — I25.10 ATHEROSCLEROSIS OF NATIVE CORONARY ARTERY OF NATIVE HEART WITHOUT ANGINA PECTORIS: Primary | ICD-10-CM

## 2024-04-22 DIAGNOSIS — I48.0 PAROXYSMAL ATRIAL FIBRILLATION (HCC): ICD-10-CM

## 2024-04-22 DIAGNOSIS — I42.8 NICM (NONISCHEMIC CARDIOMYOPATHY) (HCC): ICD-10-CM

## 2024-04-22 PROCEDURE — 3078F DIAST BP <80 MM HG: CPT | Performed by: INTERNAL MEDICINE

## 2024-04-22 PROCEDURE — 3075F SYST BP GE 130 - 139MM HG: CPT | Performed by: INTERNAL MEDICINE

## 2024-04-22 PROCEDURE — 1123F ACP DISCUSS/DSCN MKR DOCD: CPT | Performed by: INTERNAL MEDICINE

## 2024-04-22 PROCEDURE — 99214 OFFICE O/P EST MOD 30 MIN: CPT | Performed by: INTERNAL MEDICINE

## 2024-04-22 RX ORDER — FUROSEMIDE 40 MG/1
40 TABLET ORAL DAILY
Qty: 30 TABLET | Refills: 5 | Status: SHIPPED | OUTPATIENT
Start: 2024-04-22

## 2024-04-22 RX ORDER — SPIRONOLACTONE 25 MG/1
12.5 TABLET ORAL DAILY
Qty: 45 TABLET | Refills: 1 | Status: SHIPPED | OUTPATIENT
Start: 2024-04-22

## 2024-04-22 NOTE — PATIENT INSTRUCTIONS
Increase Lasix to 40 mg daily   Add Aldactone 12.5 mg daily   Labs in 1 week   Call after 1 week to report how your breathing is  Will need to repeat echo in July   Referral to cardiac rehab  Follow up with me in 2 weeks

## 2024-04-22 NOTE — PROGRESS NOTES
02/26/2024       The 10-year ASCVD risk score (Todd DK, et al., 2019) is: 11.3%    Values used to calculate the score:      Age: 69 years      Sex: Female      Is Non- : No      Diabetic: No      Tobacco smoker: No      Systolic Blood Pressure: 132 mmHg      Is BP treated: Yes      HDL Cholesterol: 65 mg/dL      Total Cholesterol: 186 mg/dL      Imaging:       ECG (if available, Personally interpreted):    Last Monitor/Holter (if available):    Last Stress   Lexiscan 2/21/24   Summary   Overall findings represent a intermediate to high risk scan.   Reduced LVEF 42%.   Moderate size mixed perfusion defect involving the mid to distal anterior   and anteroseptum. Findings are suggestive of scar/ischemia.   Non-diagnostic EKG response due to failure to reach target heart rate.    Last Cath 2/28/24  HEMODYNAMIC / ANGIOGRAPHIC DATA:       /77 Left ventricular end diastolic pressure was 7 mmHg. There was no gradient across the aortic valve upon pullback.The left main coronary artery arises from the left coronary cusp giving rise to the left anterior descending artery and the left circumflex artery.  The left main reveals no angiographically significant stenosis.  The left anterior descending artery arises in normal fashion from left coronary giving rise to diagonals and septal branches.  The LAD reveals up to 30% stenosis in midportion.  First and second diagonal up to 50% ostial stenosis.  Left circumflex is a very small caliber vessel with mild luminal irregularities.  The right coronary artery is a right dominant vessel. It reveals moderate calcification in the ostium.  Up to 30% proximal stenosis.  PLB branch with up to 50 to 60% distal stenosis.  PDA revealed no significant stenosis.     CONCLUSIONS:  Moderate CAD as above    Last TTE/IDALMIS 2/19/24   Summary   Left ventricular cavity size is normal.   There is mild concentric left ventricular hypertrophy.   Left ventricular function is

## 2024-04-30 DIAGNOSIS — I25.10 ATHEROSCLEROSIS OF NATIVE CORONARY ARTERY OF NATIVE HEART WITHOUT ANGINA PECTORIS: ICD-10-CM

## 2024-04-30 DIAGNOSIS — G47.33 OSA (OBSTRUCTIVE SLEEP APNEA): ICD-10-CM

## 2024-04-30 DIAGNOSIS — E78.2 MIXED HYPERLIPIDEMIA: ICD-10-CM

## 2024-04-30 DIAGNOSIS — I48.0 PAROXYSMAL ATRIAL FIBRILLATION (HCC): ICD-10-CM

## 2024-04-30 DIAGNOSIS — I42.8 NICM (NONISCHEMIC CARDIOMYOPATHY) (HCC): ICD-10-CM

## 2024-04-30 LAB
ALBUMIN SERPL-MCNC: 4.3 G/DL (ref 3.4–5)
ALP SERPL-CCNC: 113 U/L (ref 40–129)
ALT SERPL-CCNC: 10 U/L (ref 10–40)
ANION GAP SERPL CALCULATED.3IONS-SCNC: 14 MMOL/L (ref 3–16)
AST SERPL-CCNC: 23 U/L (ref 15–37)
BILIRUB DIRECT SERPL-MCNC: <0.2 MG/DL (ref 0–0.3)
BILIRUB INDIRECT SERPL-MCNC: ABNORMAL MG/DL (ref 0–1)
BILIRUB SERPL-MCNC: 1.2 MG/DL (ref 0–1)
BUN SERPL-MCNC: 17 MG/DL (ref 7–20)
CALCIUM SERPL-MCNC: 9.5 MG/DL (ref 8.3–10.6)
CHLORIDE SERPL-SCNC: 94 MMOL/L (ref 99–110)
CO2 SERPL-SCNC: 25 MMOL/L (ref 21–32)
CREAT SERPL-MCNC: 0.6 MG/DL (ref 0.6–1.2)
GFR SERPLBLD CREATININE-BSD FMLA CKD-EPI: >90 ML/MIN/{1.73_M2}
GLUCOSE SERPL-MCNC: 93 MG/DL (ref 70–99)
POTASSIUM SERPL-SCNC: 4.7 MMOL/L (ref 3.5–5.1)
PROT SERPL-MCNC: 6.7 G/DL (ref 6.4–8.2)
SODIUM SERPL-SCNC: 133 MMOL/L (ref 136–145)

## 2024-05-07 ENCOUNTER — TELEPHONE (OUTPATIENT)
Dept: CARDIOLOGY CLINIC | Age: 69
End: 2024-05-07

## 2024-05-07 ENCOUNTER — OFFICE VISIT (OUTPATIENT)
Dept: CARDIOLOGY CLINIC | Age: 69
End: 2024-05-07
Payer: MEDICARE

## 2024-05-07 VITALS
BODY MASS INDEX: 41.34 KG/M2 | HEART RATE: 72 BPM | DIASTOLIC BLOOD PRESSURE: 72 MMHG | WEIGHT: 218.8 LBS | SYSTOLIC BLOOD PRESSURE: 134 MMHG | OXYGEN SATURATION: 90 %

## 2024-05-07 DIAGNOSIS — I48.0 PAROXYSMAL ATRIAL FIBRILLATION (HCC): ICD-10-CM

## 2024-05-07 DIAGNOSIS — I42.8 NICM (NONISCHEMIC CARDIOMYOPATHY) (HCC): Primary | ICD-10-CM

## 2024-05-07 DIAGNOSIS — I42.9 CARDIOMYOPATHY, UNSPECIFIED TYPE (HCC): ICD-10-CM

## 2024-05-07 DIAGNOSIS — E78.2 MIXED HYPERLIPIDEMIA: ICD-10-CM

## 2024-05-07 DIAGNOSIS — I10 HYPERTENSION, UNSPECIFIED TYPE: ICD-10-CM

## 2024-05-07 DIAGNOSIS — I50.22 CHRONIC SYSTOLIC CHF (CONGESTIVE HEART FAILURE) (HCC): ICD-10-CM

## 2024-05-07 PROCEDURE — 93000 ELECTROCARDIOGRAM COMPLETE: CPT | Performed by: INTERNAL MEDICINE

## 2024-05-07 PROCEDURE — 3078F DIAST BP <80 MM HG: CPT | Performed by: INTERNAL MEDICINE

## 2024-05-07 PROCEDURE — 1123F ACP DISCUSS/DSCN MKR DOCD: CPT | Performed by: INTERNAL MEDICINE

## 2024-05-07 PROCEDURE — 3075F SYST BP GE 130 - 139MM HG: CPT | Performed by: INTERNAL MEDICINE

## 2024-05-07 PROCEDURE — 99214 OFFICE O/P EST MOD 30 MIN: CPT | Performed by: INTERNAL MEDICINE

## 2024-05-07 RX ORDER — METOPROLOL SUCCINATE 25 MG/1
25 TABLET, EXTENDED RELEASE ORAL 2 TIMES DAILY
Qty: 60 TABLET | Refills: 5 | Status: SHIPPED | OUTPATIENT
Start: 2024-05-07

## 2024-05-07 NOTE — TELEPHONE ENCOUNTER
Spoke with pt. Per Dr. Euceda, change Lopressor to Toprol XL 25 mg BID. Pt agreeable. Script sent.

## 2024-05-07 NOTE — PROGRESS NOTES
annular calcification is present.   Mild mitral regurgitation is present.   Bi-atrial enlargement.   Aortic valve appears thickened/calcified but opens adequately.   Mild to moderate tricuspid regurgitation.   Estimated pulmonary artery systolic pressure is at 56 mmHg assuming a right   atrial pressure of 8 mmHg.    Last CMR  (if available):    Last Coronary Artery Calcium Score:     Ankle-brachial index:    Carotid ultrasound screening:    Abdominal aortic aneurysm screening:    Assessment / Plan:     1. NICM (nonischemic cardiomyopathy) (HCC)    2. Chronic systolic CHF (congestive heart failure) (HCC)    3. Paroxysmal atrial fibrillation (HCC)    4. Mixed hyperlipidemia    5. Hypertension, unspecified type    6. Cardiomyopathy, unspecified type (HCC)      Doing well, swelling and breathing improved  Continue Lasix and Aldactone  BP at goal, continue hydralazine/losartan  Change metoprolol tartrate to succinate when rx runs out  Repeat labs in 1 month - serum sodium on low side  Echocardiogram in July   Follow up with me after echocardiogram    Orders Placed This Encounter   Procedures    Comprehensive Metabolic Panel    EKG 12 lead       The note was completed using EMR and Dragon dictation system. Every effort was made to ensure accuracy; however, inadvertent computerized transcription errors may be present.    All questions and concerns were addressed to the patient.     I would like to thank you for providing me the opportunity to participate in the care of your patient. If you have any questions, please do not hesitate to contact me.     Merary Euceda MD, [facility] [place] [address]  Main Office Phone: [phone]  Fax: [phone]    This note was scribed in the presence of Dr. Knisey BROWN by Krissy Pfeiffer RN.       Physician Attestation:  The scribes documentation has been prepared under my direction and personally reviewed by me in its entirety.

## 2024-05-13 ENCOUNTER — OFFICE VISIT (OUTPATIENT)
Age: 69
End: 2024-05-13
Payer: MEDICARE

## 2024-05-13 VITALS
BODY MASS INDEX: 41.19 KG/M2 | HEIGHT: 61 IN | SYSTOLIC BLOOD PRESSURE: 131 MMHG | HEART RATE: 64 BPM | DIASTOLIC BLOOD PRESSURE: 85 MMHG | WEIGHT: 218.2 LBS | OXYGEN SATURATION: 99 %

## 2024-05-13 DIAGNOSIS — I48.19 PERSISTENT ATRIAL FIBRILLATION (HCC): ICD-10-CM

## 2024-05-13 DIAGNOSIS — I10 HYPERTENSION, UNSPECIFIED TYPE: ICD-10-CM

## 2024-05-13 DIAGNOSIS — I50.22 CHRONIC SYSTOLIC CHF (CONGESTIVE HEART FAILURE) (HCC): ICD-10-CM

## 2024-05-13 DIAGNOSIS — G47.33 OSA ON CPAP: Primary | ICD-10-CM

## 2024-05-13 DIAGNOSIS — I42.8 NICM (NONISCHEMIC CARDIOMYOPATHY) (HCC): ICD-10-CM

## 2024-05-13 PROCEDURE — 1123F ACP DISCUSS/DSCN MKR DOCD: CPT | Performed by: STUDENT IN AN ORGANIZED HEALTH CARE EDUCATION/TRAINING PROGRAM

## 2024-05-13 PROCEDURE — 99214 OFFICE O/P EST MOD 30 MIN: CPT | Performed by: STUDENT IN AN ORGANIZED HEALTH CARE EDUCATION/TRAINING PROGRAM

## 2024-05-13 PROCEDURE — G2211 COMPLEX E/M VISIT ADD ON: HCPCS | Performed by: STUDENT IN AN ORGANIZED HEALTH CARE EDUCATION/TRAINING PROGRAM

## 2024-05-13 PROCEDURE — 3078F DIAST BP <80 MM HG: CPT | Performed by: STUDENT IN AN ORGANIZED HEALTH CARE EDUCATION/TRAINING PROGRAM

## 2024-05-13 PROCEDURE — 3077F SYST BP >= 140 MM HG: CPT | Performed by: STUDENT IN AN ORGANIZED HEALTH CARE EDUCATION/TRAINING PROGRAM

## 2024-05-13 ASSESSMENT — SLEEP AND FATIGUE QUESTIONNAIRES
HOW LIKELY ARE YOU TO NOD OFF OR FALL ASLEEP WHILE SITTING QUIETLY AFTER LUNCH WITHOUT ALCOHOL: WOULD NEVER DOZE
HOW LIKELY ARE YOU TO NOD OFF OR FALL ASLEEP WHILE SITTING AND READING: SLIGHT CHANCE OF DOZING
HOW LIKELY ARE YOU TO NOD OFF OR FALL ASLEEP WHEN YOU ARE A PASSENGER IN A CAR FOR AN HOUR WITHOUT A BREAK: WOULD NEVER DOZE
HOW LIKELY ARE YOU TO NOD OFF OR FALL ASLEEP WHILE WATCHING TV: WOULD NEVER DOZE
HOW LIKELY ARE YOU TO NOD OFF OR FALL ASLEEP IN A CAR, WHILE STOPPED FOR A FEW MINUTES IN TRAFFIC: WOULD NEVER DOZE
HOW LIKELY ARE YOU TO NOD OFF OR FALL ASLEEP WHILE LYING DOWN TO REST IN THE AFTERNOON WHEN CIRCUMSTANCES PERMIT: SLIGHT CHANCE OF DOZING
HOW LIKELY ARE YOU TO NOD OFF OR FALL ASLEEP WHILE SITTING INACTIVE IN A PUBLIC PLACE: SLIGHT CHANCE OF DOZING
ESS TOTAL SCORE: 3
HOW LIKELY ARE YOU TO NOD OFF OR FALL ASLEEP WHILE SITTING AND TALKING TO SOMEONE: WOULD NEVER DOZE

## 2024-05-13 NOTE — PROGRESS NOTES
[facility]  Sleep Medicine  [address], [address]  [place] [address]    Chief Complaint   Patient presents with    Sleep Apnea         Elo Paul comes in today for sleep apnea follow-up . She was diagnosed with mild obstructive sleep apnea and is being treated with PAP therapy.   She has been on PAP therapy for a couple of months.  She states she wears the PAP device most nights.     She falls asleep in  few minutes.  She awakens 3-4 times per night and takes naps but very rarely. The average total amount of sleep is about 6-8 hours per night. She does not use sleep aid/s. Symptoms of sleep apnea have improved since using PAP therapy. She is not snoring with the machine on.  She denies any complaints of hunger for air, dry mouth / nose, mask fit / discomfort issues, low air humidity, high air humidity, temperature issues, and high/frequent leaks.  She does complain of pressure being a bit too high and  bloating and feeling gassy in the mornings .   DME: [facility]  Mask: AirFit F20  Machine: ResMed Airsense 11 Autoset      DATA REVIEWED TODAY:    Diagnostic Review  HST on 2/22/2024 showed respiratory event index of 5.9/h with oxygen desaturation down to a jazzy of 84%.     Foster           5/13/2024     1:06 PM 2/13/2024     9:24 AM   Sleep Medicine   Sitting and reading 1 0   Watching TV 0 0   Sitting, inactive in a public place (e.g. a theatre or a meeting) 1 0   As a passenger in a car for an hour without a break 0 0   Lying down to rest in the afternoon when circumstances permit 1 0   Sitting and talking to someone 0 0   Sitting quietly after a lunch without alcohol 0 0   In a car, while stopped for a few minutes in traffic 0 0   Foster Sleepiness Score 3 0   Neck circumference (Inches)  15       PAP Adherence (dates: 3/19/2024 - 5/9/2024)  Total days used: 49/52  % used days >= 4 hours: 90%  Average hours used per day: 5 hrs 28 mins  Mode: auto CPAP  Pressure

## 2024-05-13 NOTE — PATIENT INSTRUCTIONS
DARIEN education:    You have obstructive sleep apnea (DARIEN):    1. Obstructive sleep apnea (DARIEN) is a condition where the upper airway narrows or closes intermittently during sleep. This can lead to drops in your oxygen levels during sleep, arousals from sleep, and excessive daytime sleepiness.     2. Untreated DARIEN is associated with increased risk of hypertension, cardiac disease, myocardial infarction, stroke, and poor blood sugar control. Treating your DARIEN can decrease these risks.    3. Weight loss does improve sleep apnea. It is important to have a healthy diet and an exercise program.     4. Alcohol and sedating medicine can make DARIEN worse and should be avoided in particular before sleep.    5. If you have surgery or are hospitalized, tell your doctor that you have DARIEN and bring your CPAP to the hospital.    6. If you are drowsy or sleepy, you should not drive. If you are driving and become drowsy or sleepy, you should pull over to a safe place. Below are our drowsy driving tips.     PAP machine care:   You should clean your PAP regularly (see your PAP  site for more details)  Daily cleaning   1. Wipe mask with a damp towel with mild detergent, rinse with a damp towel and let air dry. You can also see CPAP cleaning wipes. Avoid harsh cleaning wipes or chemicals.    2. Humidifier- empty water daily. Fill with clean distilled water before use before sleep.    Weekly Cleaning   1. Clean mask, headgear, and tubing weekly  2. Fill your sink with warm water and a few drops of mild dish soap. Swirl equipment for at least 5 minutes. Rinse well and air dry. Hang hose over something so the water droplets drip out.   3. Clean filter- rinse with warm water, squeeze excess water and blot dry with towel. If there is a white filter (respironics machine)- do not wash that - it is disposable and should be changed once a month.   4. Humidifier- Disinfect in solution that is one part vinegar and 5 parts water for 30

## 2024-05-20 ENCOUNTER — OFFICE VISIT (OUTPATIENT)
Dept: CARDIOLOGY CLINIC | Age: 69
End: 2024-05-20
Payer: MEDICARE

## 2024-05-20 VITALS
WEIGHT: 217 LBS | BODY MASS INDEX: 41 KG/M2 | SYSTOLIC BLOOD PRESSURE: 130 MMHG | HEART RATE: 65 BPM | DIASTOLIC BLOOD PRESSURE: 80 MMHG

## 2024-05-20 DIAGNOSIS — I48.19 PERSISTENT ATRIAL FIBRILLATION (HCC): Primary | ICD-10-CM

## 2024-05-20 DIAGNOSIS — Z79.899 ON AMIODARONE THERAPY: ICD-10-CM

## 2024-05-20 DIAGNOSIS — G47.33 OSA (OBSTRUCTIVE SLEEP APNEA): ICD-10-CM

## 2024-05-20 DIAGNOSIS — I25.10 CORONARY ARTERY DISEASE INVOLVING NATIVE CORONARY ARTERY OF NATIVE HEART WITHOUT ANGINA PECTORIS: ICD-10-CM

## 2024-05-20 DIAGNOSIS — I42.8 NICM (NONISCHEMIC CARDIOMYOPATHY) (HCC): ICD-10-CM

## 2024-05-20 DIAGNOSIS — I48.3 TYPICAL ATRIAL FLUTTER (HCC): ICD-10-CM

## 2024-05-20 DIAGNOSIS — I50.22 CHRONIC SYSTOLIC CHF (CONGESTIVE HEART FAILURE) (HCC): ICD-10-CM

## 2024-05-20 DIAGNOSIS — E78.2 MIXED HYPERLIPIDEMIA: ICD-10-CM

## 2024-05-20 DIAGNOSIS — I10 HYPERTENSION, UNSPECIFIED TYPE: ICD-10-CM

## 2024-05-20 PROCEDURE — 99214 OFFICE O/P EST MOD 30 MIN: CPT | Performed by: INTERNAL MEDICINE

## 2024-05-20 PROCEDURE — 1123F ACP DISCUSS/DSCN MKR DOCD: CPT | Performed by: INTERNAL MEDICINE

## 2024-05-20 PROCEDURE — 3075F SYST BP GE 130 - 139MM HG: CPT | Performed by: INTERNAL MEDICINE

## 2024-05-20 PROCEDURE — 93000 ELECTROCARDIOGRAM COMPLETE: CPT | Performed by: INTERNAL MEDICINE

## 2024-05-20 PROCEDURE — 3079F DIAST BP 80-89 MM HG: CPT | Performed by: INTERNAL MEDICINE

## 2024-06-10 DIAGNOSIS — I48.0 PAROXYSMAL ATRIAL FIBRILLATION (HCC): ICD-10-CM

## 2024-06-10 DIAGNOSIS — I10 HYPERTENSION, UNSPECIFIED TYPE: ICD-10-CM

## 2024-06-10 DIAGNOSIS — E78.2 MIXED HYPERLIPIDEMIA: ICD-10-CM

## 2024-06-10 DIAGNOSIS — I50.22 CHRONIC SYSTOLIC CHF (CONGESTIVE HEART FAILURE) (HCC): ICD-10-CM

## 2024-06-10 DIAGNOSIS — I42.9 CARDIOMYOPATHY, UNSPECIFIED TYPE (HCC): ICD-10-CM

## 2024-06-10 DIAGNOSIS — I42.8 NICM (NONISCHEMIC CARDIOMYOPATHY) (HCC): ICD-10-CM

## 2024-06-10 RX ORDER — HYDROCORTISONE 25 MG/G
CREAM TOPICAL
Qty: 1 EACH | Refills: 1 | Status: SHIPPED | OUTPATIENT
Start: 2024-06-10

## 2024-06-10 RX ORDER — METOPROLOL SUCCINATE 25 MG/1
TABLET, EXTENDED RELEASE ORAL
Qty: 180 TABLET | Refills: 1 | Status: SHIPPED | OUTPATIENT
Start: 2024-06-10

## 2024-06-10 NOTE — TELEPHONE ENCOUNTER
Requested Prescriptions     Pending Prescriptions Disp Refills    metoprolol succinate (TOPROL XL) 25 MG extended release tablet [Pharmacy Med Name: METOPROLOL SUCC ER 25 MG TAB] 180 tablet 1     Sig: TAKE 1 TABLET BY MOUTH IN THE MORNING AND IN THE EVENING          Last Office Visit: 5/20/2024     Next Office Visit: 7/30/2024

## 2024-06-15 LAB
ALBUMIN SERPL-MCNC: 4.7 G/DL (ref 3.4–5)
ALBUMIN/GLOB SERPL: 2.2 {RATIO} (ref 1.1–2.2)
ALP SERPL-CCNC: 108 U/L (ref 40–129)
ALT SERPL-CCNC: 14 U/L (ref 10–40)
ANION GAP SERPL CALCULATED.3IONS-SCNC: 13 MMOL/L (ref 3–16)
AST SERPL-CCNC: 19 U/L (ref 15–37)
BILIRUB SERPL-MCNC: 1.1 MG/DL (ref 0–1)
BUN SERPL-MCNC: 19 MG/DL (ref 7–20)
CALCIUM SERPL-MCNC: 9 MG/DL (ref 8.3–10.6)
CHLORIDE SERPL-SCNC: 96 MMOL/L (ref 99–110)
CO2 SERPL-SCNC: 23 MMOL/L (ref 21–32)
CREAT SERPL-MCNC: 0.7 MG/DL (ref 0.6–1.2)
GFR SERPLBLD CREATININE-BSD FMLA CKD-EPI: >90 ML/MIN/{1.73_M2}
GLUCOSE SERPL-MCNC: 107 MG/DL (ref 70–99)
POTASSIUM SERPL-SCNC: 4.7 MMOL/L (ref 3.5–5.1)
PROT SERPL-MCNC: 6.8 G/DL (ref 6.4–8.2)
SODIUM SERPL-SCNC: 132 MMOL/L (ref 136–145)

## 2024-06-21 ENCOUNTER — TELEPHONE (OUTPATIENT)
Dept: CARDIOLOGY CLINIC | Age: 69
End: 2024-06-21

## 2024-06-21 DIAGNOSIS — I50.22 CHRONIC SYSTOLIC CHF (CONGESTIVE HEART FAILURE) (HCC): Primary | ICD-10-CM

## 2024-06-21 RX ORDER — FUROSEMIDE 20 MG/1
20 TABLET ORAL DAILY
Qty: 30 TABLET | Refills: 5
Start: 2024-06-21

## 2024-06-21 NOTE — TELEPHONE ENCOUNTER
Per Dr. Gonzalez, decrease Lasix to 20 mg daily due to low sodium. Ordered recheck labs in 3-4 weeks.

## 2024-06-23 RX ORDER — LOSARTAN POTASSIUM 100 MG/1
100 TABLET ORAL DAILY
Qty: 90 TABLET | Refills: 3 | Status: SHIPPED | OUTPATIENT
Start: 2024-06-23

## 2024-07-15 RX ORDER — HYDRALAZINE HYDROCHLORIDE 50 MG/1
50 TABLET, FILM COATED ORAL EVERY 8 HOURS SCHEDULED
Qty: 270 TABLET | Refills: 3 | Status: SHIPPED | OUTPATIENT
Start: 2024-07-15

## 2024-07-18 ENCOUNTER — HOSPITAL ENCOUNTER (OUTPATIENT)
Dept: CARDIAC REHAB | Age: 69
Setting detail: THERAPIES SERIES
Discharge: HOME | End: 2024-07-18
Payer: MEDICARE

## 2024-07-18 PROCEDURE — 93798 PHYS/QHP OP CAR RHAB W/ECG: CPT

## 2024-07-22 ENCOUNTER — APPOINTMENT (OUTPATIENT)
Dept: CARDIAC REHAB | Age: 69
End: 2024-07-22
Payer: MEDICARE

## 2024-07-23 ENCOUNTER — HOSPITAL ENCOUNTER (OUTPATIENT)
Age: 69
Discharge: HOME | End: 2024-07-25
Attending: INTERNAL MEDICINE
Payer: MEDICARE

## 2024-07-23 VITALS
DIASTOLIC BLOOD PRESSURE: 80 MMHG | WEIGHT: 217 LBS | HEIGHT: 61 IN | BODY MASS INDEX: 40.97 KG/M2 | SYSTOLIC BLOOD PRESSURE: 130 MMHG

## 2024-07-23 DIAGNOSIS — I50.22 CHRONIC SYSTOLIC CHF (CONGESTIVE HEART FAILURE) (HCC): ICD-10-CM

## 2024-07-23 DIAGNOSIS — I10 HYPERTENSION, UNSPECIFIED TYPE: ICD-10-CM

## 2024-07-23 DIAGNOSIS — I48.0 PAROXYSMAL ATRIAL FIBRILLATION (HCC): ICD-10-CM

## 2024-07-23 DIAGNOSIS — E78.2 MIXED HYPERLIPIDEMIA: ICD-10-CM

## 2024-07-23 DIAGNOSIS — I42.8 NICM (NONISCHEMIC CARDIOMYOPATHY) (HCC): ICD-10-CM

## 2024-07-23 DIAGNOSIS — I42.9 CARDIOMYOPATHY, UNSPECIFIED TYPE (HCC): ICD-10-CM

## 2024-07-23 LAB
ANION GAP SERPL CALCULATED.3IONS-SCNC: 15 MMOL/L (ref 3–16)
BUN SERPL-MCNC: 16 MG/DL (ref 7–20)
CALCIUM SERPL-MCNC: 9.4 MG/DL (ref 8.3–10.6)
CHLORIDE SERPL-SCNC: 90 MMOL/L (ref 99–110)
CO2 SERPL-SCNC: 22 MMOL/L (ref 21–32)
CREAT SERPL-MCNC: 0.7 MG/DL (ref 0.6–1.2)
ECHO AV AREA PEAK VELOCITY: 2.2 CM2
ECHO AV AREA VTI: 2.1 CM2
ECHO AV AREA/BSA PEAK VELOCITY: 1.1 CM2/M2
ECHO AV AREA/BSA VTI: 1.1 CM2/M2
ECHO AV CUSP MM: 2.1 CM
ECHO AV MEAN GRADIENT: 9 MMHG
ECHO AV MEAN VELOCITY: 1.4 M/S
ECHO AV PEAK GRADIENT: 14 MMHG
ECHO AV PEAK VELOCITY: 1.9 M/S
ECHO AV VELOCITY RATIO: 0.68
ECHO AV VTI: 47.4 CM
ECHO BSA: 2.06 M2
ECHO EST RA PRESSURE: 8 MMHG
ECHO LA AREA 4C: 26.4 CM2
ECHO LA MAJOR AXIS: 6.6 CM
ECHO LA VOL MOD A4C: 87 ML (ref 22–52)
ECHO LA VOLUME INDEX MOD A4C: 44 ML/M2 (ref 16–34)
ECHO LV E' LATERAL VELOCITY: 8 CM/S
ECHO LV FRACTIONAL SHORTENING: 46 % (ref 28–44)
ECHO LV INTERNAL DIMENSION DIASTOLE INDEX: 2.65 CM/M2
ECHO LV INTERNAL DIMENSION DIASTOLIC: 5.2 CM (ref 3.9–5.3)
ECHO LV INTERNAL DIMENSION SYSTOLIC INDEX: 1.43 CM/M2
ECHO LV INTERNAL DIMENSION SYSTOLIC: 2.8 CM
ECHO LV IVSD: 0.7 CM (ref 0.6–0.9)
ECHO LV MASS 2D: 101.7 G (ref 67–162)
ECHO LV MASS INDEX 2D: 51.9 G/M2 (ref 43–95)
ECHO LV POSTERIOR WALL DIASTOLIC: 0.5 CM (ref 0.6–0.9)
ECHO LV RELATIVE WALL THICKNESS RATIO: 0.19
ECHO LVOT AREA: 3.1 CM2
ECHO LVOT AV VTI INDEX: 0.65
ECHO LVOT DIAM: 2 CM
ECHO LVOT MEAN GRADIENT: 4 MMHG
ECHO LVOT PEAK GRADIENT: 7 MMHG
ECHO LVOT PEAK VELOCITY: 1.3 M/S
ECHO LVOT STROKE VOLUME INDEX: 49.5 ML/M2
ECHO LVOT SV: 97 ML
ECHO LVOT VTI: 30.9 CM
ECHO MV A VELOCITY: 0.96 M/S
ECHO MV AREA VTI: 2.3 CM2
ECHO MV E VELOCITY: 1.32 M/S
ECHO MV E/A RATIO: 1.38
ECHO MV E/E' LATERAL: 16.5
ECHO MV LVOT VTI INDEX: 1.37
ECHO MV MAX VELOCITY: 1.6 M/S
ECHO MV MEAN GRADIENT: 3 MMHG
ECHO MV MEAN VELOCITY: 0.8 M/S
ECHO MV PEAK GRADIENT: 10 MMHG
ECHO MV REGURGITANT PEAK GRADIENT: 85 MMHG
ECHO MV REGURGITANT PEAK VELOCITY: 4.6 M/S
ECHO MV REGURGITANT VTIA: 151 CM
ECHO MV VTI: 42.2 CM
ECHO PV MAX VELOCITY: 0.8 M/S
ECHO PV MEAN GRADIENT: 2 MMHG
ECHO PV MEAN VELOCITY: 0.6 M/S
ECHO PV PEAK GRADIENT: 3 MMHG
ECHO PV VTI: 16.6 CM
ECHO RIGHT VENTRICULAR SYSTOLIC PRESSURE (RVSP): 53 MMHG
ECHO RV FREE WALL PEAK S': 12 CM/S
ECHO RV INTERNAL DIMENSION: 3.2 CM
ECHO RV TAPSE: 2.5 CM (ref 1.7–?)
ECHO TV REGURGITANT MAX VELOCITY: 3.34 M/S
ECHO TV REGURGITANT PEAK GRADIENT: 45 MMHG
GFR SERPLBLD CREATININE-BSD FMLA CKD-EPI: >90 ML/MIN/{1.73_M2}
GLUCOSE SERPL-MCNC: 102 MG/DL (ref 70–99)
POTASSIUM SERPL-SCNC: 4.3 MMOL/L (ref 3.5–5.1)
SODIUM SERPL-SCNC: 127 MMOL/L (ref 136–145)

## 2024-07-23 PROCEDURE — 93306 TTE W/DOPPLER COMPLETE: CPT | Performed by: INTERNAL MEDICINE

## 2024-07-23 PROCEDURE — 93306 TTE W/DOPPLER COMPLETE: CPT

## 2024-07-24 ENCOUNTER — HOSPITAL ENCOUNTER (OUTPATIENT)
Dept: CARDIAC REHAB | Age: 69
Setting detail: THERAPIES SERIES
Discharge: HOME | End: 2024-07-24
Payer: MEDICARE

## 2024-07-24 DIAGNOSIS — I50.22 CHRONIC SYSTOLIC CHF (CONGESTIVE HEART FAILURE) (HCC): Primary | ICD-10-CM

## 2024-07-24 DIAGNOSIS — I50.22 CHRONIC SYSTOLIC CHF (CONGESTIVE HEART FAILURE) (HCC): ICD-10-CM

## 2024-07-24 LAB — NT-PROBNP SERPL-MCNC: 429 PG/ML (ref 0–124)

## 2024-07-24 PROCEDURE — 93798 PHYS/QHP OP CAR RHAB W/ECG: CPT

## 2024-07-26 ENCOUNTER — HOSPITAL ENCOUNTER (OUTPATIENT)
Dept: CARDIAC REHAB | Age: 69
Setting detail: THERAPIES SERIES
Discharge: HOME | End: 2024-07-26
Payer: MEDICARE

## 2024-07-26 PROCEDURE — 93798 PHYS/QHP OP CAR RHAB W/ECG: CPT

## 2024-07-29 ENCOUNTER — HOSPITAL ENCOUNTER (OUTPATIENT)
Dept: CARDIAC REHAB | Age: 69
Setting detail: THERAPIES SERIES
Discharge: HOME | End: 2024-07-29
Payer: MEDICARE

## 2024-07-29 DIAGNOSIS — I50.22 CHRONIC SYSTOLIC CHF (CONGESTIVE HEART FAILURE) (HCC): ICD-10-CM

## 2024-07-29 PROCEDURE — 93798 PHYS/QHP OP CAR RHAB W/ECG: CPT

## 2024-07-29 NOTE — PROGRESS NOTES
other day due to down trending serum sodium   Recheck labs day prior to follow up  Follow up in 3 weeks with Dr. Irwin     Orders Placed This Encounter   Procedures    Basic Metabolic Panel       The note was completed using EMR and Dragon dictation system. Every effort was made to ensure accuracy; however, inadvertent computerized transcription errors may be present.    All questions and concerns were addressed to the patient.     I would like to thank you for providing me the opportunity to participate in the care of your patient. If you have any questions, please do not hesitate to contact me.     Merary Euceda MD, [place] [address]  Main Office Phone: [phone]  Fax: [phone]    This note was scribed in the presence of Dr. Kinsey BROWN by Krissy Pfeiffer RN.       Physician Attestation:  The scribes documentation has been prepared under my direction and personally reviewed by me in its entirety.     I confirm the note above accurately reflects all work, treatment, procedures, and medical decision making performed by me.    Electronically signed by Merary Euceda MD on 7/30/2024 at 9:29 AM

## 2024-07-30 ENCOUNTER — OFFICE VISIT (OUTPATIENT)
Dept: CARDIOLOGY CLINIC | Age: 69
End: 2024-07-30
Payer: MEDICARE

## 2024-07-30 VITALS
WEIGHT: 217 LBS | BODY MASS INDEX: 41 KG/M2 | HEART RATE: 64 BPM | SYSTOLIC BLOOD PRESSURE: 140 MMHG | DIASTOLIC BLOOD PRESSURE: 78 MMHG

## 2024-07-30 DIAGNOSIS — I10 HYPERTENSION, UNSPECIFIED TYPE: ICD-10-CM

## 2024-07-30 DIAGNOSIS — E78.2 MIXED HYPERLIPIDEMIA: ICD-10-CM

## 2024-07-30 DIAGNOSIS — I48.0 PAROXYSMAL ATRIAL FIBRILLATION (HCC): Primary | ICD-10-CM

## 2024-07-30 DIAGNOSIS — I42.8 NICM (NONISCHEMIC CARDIOMYOPATHY) (HCC): ICD-10-CM

## 2024-07-30 DIAGNOSIS — R79.89 LOW SERUM SODIUM: ICD-10-CM

## 2024-07-30 LAB
ANION GAP SERPL CALCULATED.3IONS-SCNC: 17 MMOL/L (ref 3–16)
BUN SERPL-MCNC: 18 MG/DL (ref 7–20)
CALCIUM SERPL-MCNC: 9.7 MG/DL (ref 8.3–10.6)
CHLORIDE SERPL-SCNC: 89 MMOL/L (ref 99–110)
CO2 SERPL-SCNC: 21 MMOL/L (ref 21–32)
CREAT SERPL-MCNC: 0.8 MG/DL (ref 0.6–1.2)
GFR SERPLBLD CREATININE-BSD FMLA CKD-EPI: 80 ML/MIN/{1.73_M2}
GLUCOSE SERPL-MCNC: 93 MG/DL (ref 70–99)
POTASSIUM SERPL-SCNC: 5 MMOL/L (ref 3.5–5.1)
SODIUM SERPL-SCNC: 127 MMOL/L (ref 136–145)

## 2024-07-30 PROCEDURE — 1123F ACP DISCUSS/DSCN MKR DOCD: CPT | Performed by: INTERNAL MEDICINE

## 2024-07-30 PROCEDURE — 3077F SYST BP >= 140 MM HG: CPT | Performed by: INTERNAL MEDICINE

## 2024-07-30 PROCEDURE — 3078F DIAST BP <80 MM HG: CPT | Performed by: INTERNAL MEDICINE

## 2024-07-30 PROCEDURE — 99214 OFFICE O/P EST MOD 30 MIN: CPT | Performed by: INTERNAL MEDICINE

## 2024-07-30 RX ORDER — FUROSEMIDE 20 MG/1
20 TABLET ORAL EVERY OTHER DAY
Qty: 30 TABLET | Refills: 5
Start: 2024-07-30

## 2024-07-30 NOTE — PATIENT INSTRUCTIONS
Breathing and weight are stable  Decrease Lasix to 20 mg every other day due to down trending serum sodium   Recheck labs day prior to follow up  Do not completely limit dietary sodium intake   Follow up in 3 weeks with Dr. Irwin

## 2024-07-31 ENCOUNTER — HOSPITAL ENCOUNTER (OUTPATIENT)
Dept: CARDIAC REHAB | Age: 69
Setting detail: THERAPIES SERIES
Discharge: HOME | End: 2024-07-31
Payer: MEDICARE

## 2024-07-31 PROCEDURE — 93798 PHYS/QHP OP CAR RHAB W/ECG: CPT

## 2024-08-02 ENCOUNTER — HOSPITAL ENCOUNTER (OUTPATIENT)
Dept: CARDIAC REHAB | Age: 69
Setting detail: THERAPIES SERIES
Discharge: HOME | End: 2024-08-02
Payer: MEDICARE

## 2024-08-02 PROCEDURE — 93798 PHYS/QHP OP CAR RHAB W/ECG: CPT

## 2024-08-05 ENCOUNTER — HOSPITAL ENCOUNTER (OUTPATIENT)
Dept: CARDIAC REHAB | Age: 69
Setting detail: THERAPIES SERIES
Discharge: HOME | End: 2024-08-05
Payer: MEDICARE

## 2024-08-05 PROCEDURE — 93798 PHYS/QHP OP CAR RHAB W/ECG: CPT

## 2024-08-07 ENCOUNTER — APPOINTMENT (OUTPATIENT)
Dept: CARDIAC REHAB | Age: 69
End: 2024-08-07
Payer: MEDICARE

## 2024-08-09 ENCOUNTER — APPOINTMENT (OUTPATIENT)
Dept: CARDIAC REHAB | Age: 69
End: 2024-08-09
Payer: MEDICARE

## 2024-08-12 ENCOUNTER — HOSPITAL ENCOUNTER (OUTPATIENT)
Dept: CARDIAC REHAB | Age: 69
Setting detail: THERAPIES SERIES
Discharge: HOME | End: 2024-08-12
Payer: MEDICARE

## 2024-08-12 PROCEDURE — 93798 PHYS/QHP OP CAR RHAB W/ECG: CPT

## 2024-08-14 ENCOUNTER — HOSPITAL ENCOUNTER (OUTPATIENT)
Dept: CARDIAC REHAB | Age: 69
Setting detail: THERAPIES SERIES
Discharge: HOME | End: 2024-08-14
Payer: MEDICARE

## 2024-08-14 DIAGNOSIS — R79.89 LOW SERUM SODIUM: ICD-10-CM

## 2024-08-14 LAB
ANION GAP SERPL CALCULATED.3IONS-SCNC: 14 MMOL/L (ref 3–16)
BUN SERPL-MCNC: 16 MG/DL (ref 7–20)
CALCIUM SERPL-MCNC: 8.8 MG/DL (ref 8.3–10.6)
CHLORIDE SERPL-SCNC: 92 MMOL/L (ref 99–110)
CO2 SERPL-SCNC: 22 MMOL/L (ref 21–32)
CREAT SERPL-MCNC: 0.8 MG/DL (ref 0.6–1.2)
GFR SERPLBLD CREATININE-BSD FMLA CKD-EPI: 80 ML/MIN/{1.73_M2}
GLUCOSE SERPL-MCNC: 83 MG/DL (ref 70–99)
POTASSIUM SERPL-SCNC: 4.5 MMOL/L (ref 3.5–5.1)
SODIUM SERPL-SCNC: 128 MMOL/L (ref 136–145)

## 2024-08-14 PROCEDURE — 93798 PHYS/QHP OP CAR RHAB W/ECG: CPT

## 2024-08-16 ENCOUNTER — HOSPITAL ENCOUNTER (OUTPATIENT)
Dept: CARDIAC REHAB | Age: 69
Setting detail: THERAPIES SERIES
Discharge: HOME | End: 2024-08-16
Payer: MEDICARE

## 2024-08-16 PROCEDURE — 93798 PHYS/QHP OP CAR RHAB W/ECG: CPT

## 2024-08-16 NOTE — PATIENT INSTRUCTIONS
Increase Lipitor to 40 mg nightly   Continue Eliquis and Amiodarone   Continue to follow up with Dr. Muñoz   Heart function normal on last echocardiogram   Not recommended to drink more than 64 ounces of total fluids per day  Continue with cardiac rehab   Follow up with me in 6 months with labs prior         Thank you for choosing [facility] for your cardiac care.    During your visit today, we reviewed and confirmed your cardiac medications along with  medication prescribed by your other healthcare team members. Please be sure to discuss any  changes to medication with your providers.    Please bring a list of ALL medications (or the bottles) with you to EVERY appointment.  Also include vitamins and over-the-counter medications.    If you need refills for any cardiac medications, please call your pharmacy and they will reach out to us electronically.    Did your provider order testing today? If yes, then you will receive your results in three  possible ways. You can receive a [employer] message, a phone call, or letter in the mail. Please  note, if you are an active [employer] user, some of your testing will be available within 1-2 days.    Finally, please know that it is good for your heart to exercise and follow a healthy, low-fat diet  as advised by your physician and health care providers.    If you are experiencing a medical emergency, please call 911 immediately.    It's easy to register for a [employer] account if you don't already have one. With a [employer]  account you can manage your health record, view test results, schedule appointments and  more.     Dr. Irwin's clinical staff can be reached at the following phone number: [phone]

## 2024-08-16 NOTE — PROGRESS NOTES
Summary   Overall findings represent a intermediate to high risk scan.   Reduced LVEF 42%.   Moderate size mixed perfusion defect involving the mid to distal anterior   and anteroseptum. Findings are suggestive of scar/ischemia.   Non-diagnostic EKG response due to failure to reach target heart rate.         Last Cath (if available):    Last TTE/IDALMIS(if available):7/23/24      Image quality is poor.    Left Ventricle: Normal left ventricular systolic function with a visually estimated EF of 55 - 60%. Not well visualized. Left ventricle size is normal. Normal wall thickness.  Consider repeat study with contrast versus alternative mode of LV function assessment. Unable to assess wall motion. Abnormal diastolic function.    Mitral Valve: Not well visualized. Annular calcification. Mildly thickened leaflets.    Tricuspid Valve: Moderate regurgitation. The estimated RVSP is 53 mmHg.         Last CMR  (if available):    Last Coronary Artery Calcium Score:                 All questions and concerns were addressed to the patient/family. Alternatives to my treatment were discussed. The note was completed using EMR. Every effort was made to ensure accuracy; however, inadvertent computerized transcription errors may be present.    Thank you for allowing me to participate in the care of your patient.    I, Usman Irwin MD, personally performed the services prescribed in this documentation as scribed by Ms. Rosa Elena Cobb RN in my presence and it is both accurate and complete.       Usman Irwin MD  [facility] [place]  [address], [address], [facility] [address]  Tel   Fax

## 2024-08-19 ENCOUNTER — HOSPITAL ENCOUNTER (OUTPATIENT)
Dept: CARDIAC REHAB | Age: 69
Setting detail: THERAPIES SERIES
Discharge: HOME | End: 2024-08-19
Payer: MEDICARE

## 2024-08-19 PROCEDURE — 93798 PHYS/QHP OP CAR RHAB W/ECG: CPT

## 2024-08-19 RX ORDER — FUROSEMIDE 20 MG/1
TABLET ORAL
Qty: 45 TABLET | Refills: 3 | Status: SHIPPED | OUTPATIENT
Start: 2024-08-19

## 2024-08-19 NOTE — TELEPHONE ENCOUNTER
Requested Prescriptions     Pending Prescriptions Disp Refills    furosemide (LASIX) 20 MG tablet [Pharmacy Med Name: FUROSEMIDE 40 MG TABLET] 45 tablet 3     Sig: Take 20 mg every other day            Checked Correct Pharmacy: Yes    Any changes since last refill? No     Number: 45    Refills: 3    Last Office Visit: 7/30/2024     Next Office Visit: 8/23/2024         Last Labs: 08.14.2024

## 2024-08-21 ENCOUNTER — HOSPITAL ENCOUNTER (OUTPATIENT)
Dept: CARDIAC REHAB | Age: 69
Setting detail: THERAPIES SERIES
Discharge: HOME | End: 2024-08-21
Payer: MEDICARE

## 2024-08-21 PROCEDURE — 93798 PHYS/QHP OP CAR RHAB W/ECG: CPT

## 2024-08-23 ENCOUNTER — OFFICE VISIT (OUTPATIENT)
Dept: CARDIOLOGY CLINIC | Age: 69
End: 2024-08-23
Payer: MEDICARE

## 2024-08-23 ENCOUNTER — HOSPITAL ENCOUNTER (OUTPATIENT)
Dept: CARDIAC REHAB | Age: 69
Setting detail: THERAPIES SERIES
Discharge: HOME | End: 2024-08-23
Payer: MEDICARE

## 2024-08-23 VITALS
BODY MASS INDEX: 40.25 KG/M2 | WEIGHT: 213 LBS | OXYGEN SATURATION: 97 % | DIASTOLIC BLOOD PRESSURE: 80 MMHG | SYSTOLIC BLOOD PRESSURE: 140 MMHG | HEART RATE: 72 BPM

## 2024-08-23 DIAGNOSIS — I48.0 PAROXYSMAL ATRIAL FIBRILLATION (HCC): Primary | ICD-10-CM

## 2024-08-23 DIAGNOSIS — I10 HYPERTENSION, UNSPECIFIED TYPE: ICD-10-CM

## 2024-08-23 DIAGNOSIS — E78.2 MIXED HYPERLIPIDEMIA: ICD-10-CM

## 2024-08-23 DIAGNOSIS — G47.33 OSA ON CPAP: ICD-10-CM

## 2024-08-23 DIAGNOSIS — I50.22 CHRONIC SYSTOLIC CHF (CONGESTIVE HEART FAILURE) (HCC): ICD-10-CM

## 2024-08-23 DIAGNOSIS — I42.8 NICM (NONISCHEMIC CARDIOMYOPATHY) (HCC): ICD-10-CM

## 2024-08-23 PROCEDURE — 3077F SYST BP >= 140 MM HG: CPT | Performed by: INTERNAL MEDICINE

## 2024-08-23 PROCEDURE — 93000 ELECTROCARDIOGRAM COMPLETE: CPT | Performed by: INTERNAL MEDICINE

## 2024-08-23 PROCEDURE — 3079F DIAST BP 80-89 MM HG: CPT | Performed by: INTERNAL MEDICINE

## 2024-08-23 PROCEDURE — 99214 OFFICE O/P EST MOD 30 MIN: CPT | Performed by: INTERNAL MEDICINE

## 2024-08-23 PROCEDURE — 93798 PHYS/QHP OP CAR RHAB W/ECG: CPT

## 2024-08-23 PROCEDURE — 1123F ACP DISCUSS/DSCN MKR DOCD: CPT | Performed by: INTERNAL MEDICINE

## 2024-08-23 RX ORDER — ATORVASTATIN CALCIUM 40 MG/1
40 TABLET, FILM COATED ORAL DAILY
Qty: 90 TABLET | Refills: 3 | Status: SHIPPED | OUTPATIENT
Start: 2024-08-23

## 2024-08-26 ENCOUNTER — HOSPITAL ENCOUNTER (OUTPATIENT)
Dept: CARDIAC REHAB | Age: 69
Setting detail: THERAPIES SERIES
Discharge: HOME | End: 2024-08-26
Payer: MEDICARE

## 2024-08-26 PROCEDURE — 93798 PHYS/QHP OP CAR RHAB W/ECG: CPT

## 2024-08-28 ENCOUNTER — HOSPITAL ENCOUNTER (OUTPATIENT)
Dept: CARDIAC REHAB | Age: 69
Setting detail: THERAPIES SERIES
Discharge: HOME | End: 2024-08-28
Payer: MEDICARE

## 2024-08-28 PROCEDURE — 93798 PHYS/QHP OP CAR RHAB W/ECG: CPT

## 2024-08-30 ENCOUNTER — APPOINTMENT (OUTPATIENT)
Dept: CARDIAC REHAB | Age: 69
End: 2024-08-30
Payer: MEDICARE

## 2024-09-02 SDOH — HEALTH STABILITY: PHYSICAL HEALTH: ON AVERAGE, HOW MANY DAYS PER WEEK DO YOU ENGAGE IN MODERATE TO STRENUOUS EXERCISE (LIKE A BRISK WALK)?: 3 DAYS

## 2024-09-02 SDOH — HEALTH STABILITY: PHYSICAL HEALTH: ON AVERAGE, HOW MANY MINUTES DO YOU ENGAGE IN EXERCISE AT THIS LEVEL?: 30 MIN

## 2024-09-03 DIAGNOSIS — M17.0 LOCALIZED OSTEOARTHRITIS OF BOTH KNEES: Primary | ICD-10-CM

## 2024-09-04 ENCOUNTER — APPOINTMENT (OUTPATIENT)
Dept: CARDIAC REHAB | Age: 69
End: 2024-09-04
Payer: MEDICARE

## 2024-09-04 ENCOUNTER — OFFICE VISIT (OUTPATIENT)
Dept: ORTHOPEDIC SURGERY | Age: 69
End: 2024-09-04
Payer: MEDICARE

## 2024-09-04 VITALS — HEIGHT: 61 IN | BODY MASS INDEX: 40.22 KG/M2 | WEIGHT: 213 LBS

## 2024-09-04 DIAGNOSIS — R52 PAIN: Primary | ICD-10-CM

## 2024-09-04 DIAGNOSIS — M17.0 PRIMARY OSTEOARTHRITIS OF BOTH KNEES: ICD-10-CM

## 2024-09-04 PROCEDURE — 1123F ACP DISCUSS/DSCN MKR DOCD: CPT | Performed by: PHYSICIAN ASSISTANT

## 2024-09-04 PROCEDURE — 99204 OFFICE O/P NEW MOD 45 MIN: CPT | Performed by: PHYSICIAN ASSISTANT

## 2024-09-04 PROCEDURE — 20611 DRAIN/INJ JOINT/BURSA W/US: CPT | Performed by: PHYSICIAN ASSISTANT

## 2024-09-04 RX ORDER — LIDOCAINE HYDROCHLORIDE 10 MG/ML
5 INJECTION, SOLUTION INFILTRATION; PERINEURAL ONCE
Status: COMPLETED | OUTPATIENT
Start: 2024-09-04 | End: 2024-09-04

## 2024-09-04 RX ORDER — BUPIVACAINE HYDROCHLORIDE 2.5 MG/ML
30 INJECTION, SOLUTION INFILTRATION; PERINEURAL ONCE
Status: COMPLETED | OUTPATIENT
Start: 2024-09-04 | End: 2024-09-04

## 2024-09-04 RX ORDER — TRIAMCINOLONE ACETONIDE 40 MG/ML
40 INJECTION, SUSPENSION INTRA-ARTICULAR; INTRAMUSCULAR ONCE
Status: COMPLETED | OUTPATIENT
Start: 2024-09-04 | End: 2024-09-04

## 2024-09-04 RX ADMIN — TRIAMCINOLONE ACETONIDE 40 MG: 40 INJECTION, SUSPENSION INTRA-ARTICULAR; INTRAMUSCULAR at 08:41

## 2024-09-04 RX ADMIN — LIDOCAINE HYDROCHLORIDE 5 ML: 10 INJECTION, SOLUTION INFILTRATION; PERINEURAL at 08:40

## 2024-09-04 RX ADMIN — BUPIVACAINE HYDROCHLORIDE 75 MG: 2.5 INJECTION, SOLUTION INFILTRATION; PERINEURAL at 08:40

## 2024-09-04 RX ADMIN — BUPIVACAINE HYDROCHLORIDE 75 MG: 2.5 INJECTION, SOLUTION INFILTRATION; PERINEURAL at 08:39

## 2024-09-04 RX ADMIN — LIDOCAINE HYDROCHLORIDE 5 ML: 10 INJECTION, SOLUTION INFILTRATION; PERINEURAL at 08:41

## 2024-09-04 NOTE — PROGRESS NOTES
at all possible.  We will start with an intra-articular cortisone injection bilaterally.  We have discussed viscosupplementation injections for future treatment.  She we will participate in home quad and hamstring strengthening exercises.  She also currently is in cardiac rehab which should also help her knees.  I will see her back in 6 weeks.    I discussed with Elo Paul that her history, symptoms, signs, and imaging are most consistent with knee arthritis.    We reviewed the natural history of these conditions and treatment options ranging from conservative measures (rest, icing, activity modification, physical therapy, pain meds) to surgical options.     In terms of treatment, I recommended continuing with rest, icing, avoidance of painful activities, NSAIDs or pain meds as tolerated, and physical therapy.     We discussed surgical options as well, should conservative measures fail.     Electronically signed by Naeem Rios PA-C on 9/4/2024 at 8:18 AM  This dictation was generated by voice recognition computer software.  Although all attempts are made to edit the dictation for accuracy, there may be errors in the transcription that are not intended.

## 2024-09-06 ENCOUNTER — HOSPITAL ENCOUNTER (OUTPATIENT)
Dept: CARDIAC REHAB | Age: 69
Setting detail: THERAPIES SERIES
Discharge: HOME | End: 2024-09-06
Payer: MEDICARE

## 2024-09-06 PROCEDURE — 93798 PHYS/QHP OP CAR RHAB W/ECG: CPT

## 2024-09-09 ENCOUNTER — HOSPITAL ENCOUNTER (OUTPATIENT)
Dept: CARDIAC REHAB | Age: 69
Setting detail: THERAPIES SERIES
Discharge: HOME | End: 2024-09-09
Payer: MEDICARE

## 2024-09-09 PROCEDURE — 93798 PHYS/QHP OP CAR RHAB W/ECG: CPT

## 2024-09-11 ENCOUNTER — HOSPITAL ENCOUNTER (OUTPATIENT)
Dept: CARDIAC REHAB | Age: 69
Setting detail: THERAPIES SERIES
Discharge: HOME | End: 2024-09-11
Payer: MEDICARE

## 2024-09-11 PROCEDURE — 93798 PHYS/QHP OP CAR RHAB W/ECG: CPT

## 2024-09-13 ENCOUNTER — HOSPITAL ENCOUNTER (OUTPATIENT)
Dept: CARDIAC REHAB | Age: 69
Setting detail: THERAPIES SERIES
Discharge: HOME | End: 2024-09-13
Payer: MEDICARE

## 2024-09-13 PROCEDURE — 93798 PHYS/QHP OP CAR RHAB W/ECG: CPT

## 2024-09-16 ENCOUNTER — HOSPITAL ENCOUNTER (OUTPATIENT)
Dept: CARDIAC REHAB | Age: 69
Setting detail: THERAPIES SERIES
Discharge: HOME | End: 2024-09-16
Payer: MEDICARE

## 2024-09-16 PROCEDURE — 93798 PHYS/QHP OP CAR RHAB W/ECG: CPT

## 2024-09-18 ENCOUNTER — HOSPITAL ENCOUNTER (OUTPATIENT)
Dept: CARDIAC REHAB | Age: 69
Setting detail: THERAPIES SERIES
Discharge: HOME | End: 2024-09-18
Payer: MEDICARE

## 2024-09-18 PROCEDURE — 93798 PHYS/QHP OP CAR RHAB W/ECG: CPT

## 2024-09-20 ENCOUNTER — APPOINTMENT (OUTPATIENT)
Dept: CARDIAC REHAB | Age: 69
End: 2024-09-20
Payer: MEDICARE

## 2024-09-23 ENCOUNTER — HOSPITAL ENCOUNTER (OUTPATIENT)
Dept: CARDIAC REHAB | Age: 69
Setting detail: THERAPIES SERIES
Discharge: HOME | End: 2024-09-23
Payer: MEDICARE

## 2024-09-23 PROCEDURE — 93798 PHYS/QHP OP CAR RHAB W/ECG: CPT

## 2024-09-25 ENCOUNTER — HOSPITAL ENCOUNTER (OUTPATIENT)
Dept: CARDIAC REHAB | Age: 69
Setting detail: THERAPIES SERIES
Discharge: HOME | End: 2024-09-25
Payer: MEDICARE

## 2024-09-25 PROCEDURE — 93798 PHYS/QHP OP CAR RHAB W/ECG: CPT

## 2024-09-27 ENCOUNTER — HOSPITAL ENCOUNTER (OUTPATIENT)
Dept: CARDIAC REHAB | Age: 69
Setting detail: THERAPIES SERIES
Discharge: HOME | End: 2024-09-27
Payer: MEDICARE

## 2024-09-27 PROCEDURE — 93798 PHYS/QHP OP CAR RHAB W/ECG: CPT

## 2024-09-28 RX ORDER — SPIRONOLACTONE 25 MG/1
12.5 TABLET ORAL DAILY
Qty: 45 TABLET | Refills: 3 | Status: SHIPPED | OUTPATIENT
Start: 2024-09-28

## 2024-09-30 ENCOUNTER — HOSPITAL ENCOUNTER (OUTPATIENT)
Dept: CARDIAC REHAB | Age: 69
Setting detail: THERAPIES SERIES
Discharge: HOME | End: 2024-09-30
Payer: MEDICARE

## 2024-09-30 PROCEDURE — 93798 PHYS/QHP OP CAR RHAB W/ECG: CPT

## 2024-10-02 ENCOUNTER — APPOINTMENT (OUTPATIENT)
Dept: CARDIAC REHAB | Age: 69
End: 2024-10-02
Payer: MEDICARE

## 2024-10-04 ENCOUNTER — HOSPITAL ENCOUNTER (OUTPATIENT)
Dept: CARDIAC REHAB | Age: 69
Setting detail: THERAPIES SERIES
Discharge: HOME | End: 2024-10-04
Payer: MEDICARE

## 2024-10-04 PROCEDURE — 93798 PHYS/QHP OP CAR RHAB W/ECG: CPT

## 2024-10-07 ENCOUNTER — HOSPITAL ENCOUNTER (OUTPATIENT)
Dept: CARDIAC REHAB | Age: 69
Setting detail: THERAPIES SERIES
Discharge: HOME | End: 2024-10-07
Payer: MEDICARE

## 2024-10-07 PROCEDURE — 93798 PHYS/QHP OP CAR RHAB W/ECG: CPT

## 2024-10-09 ENCOUNTER — HOSPITAL ENCOUNTER (OUTPATIENT)
Dept: CARDIAC REHAB | Age: 69
Setting detail: THERAPIES SERIES
Discharge: HOME | End: 2024-10-09
Payer: MEDICARE

## 2024-10-09 PROCEDURE — 93798 PHYS/QHP OP CAR RHAB W/ECG: CPT

## 2024-10-11 ENCOUNTER — HOSPITAL ENCOUNTER (OUTPATIENT)
Dept: CARDIAC REHAB | Age: 69
Setting detail: THERAPIES SERIES
Discharge: HOME | End: 2024-10-11
Payer: MEDICARE

## 2024-10-11 PROCEDURE — 93798 PHYS/QHP OP CAR RHAB W/ECG: CPT

## 2024-10-14 ENCOUNTER — HOSPITAL ENCOUNTER (OUTPATIENT)
Dept: CARDIAC REHAB | Age: 69
Setting detail: THERAPIES SERIES
Discharge: HOME | End: 2024-10-14
Payer: MEDICARE

## 2024-10-14 PROCEDURE — 93798 PHYS/QHP OP CAR RHAB W/ECG: CPT

## 2024-10-16 ENCOUNTER — OFFICE VISIT (OUTPATIENT)
Dept: ORTHOPEDIC SURGERY | Age: 69
End: 2024-10-16
Payer: MEDICARE

## 2024-10-16 VITALS — HEIGHT: 61 IN | BODY MASS INDEX: 40.22 KG/M2 | WEIGHT: 213 LBS

## 2024-10-16 DIAGNOSIS — M17.0 PRIMARY OSTEOARTHRITIS OF BOTH KNEES: Primary | ICD-10-CM

## 2024-10-16 PROCEDURE — 99214 OFFICE O/P EST MOD 30 MIN: CPT | Performed by: PHYSICIAN ASSISTANT

## 2024-10-16 PROCEDURE — 1123F ACP DISCUSS/DSCN MKR DOCD: CPT | Performed by: PHYSICIAN ASSISTANT

## 2024-10-16 NOTE — PROGRESS NOTES
thinning with bone-on-bone contact.      Procedure:  No orders of the defined types were placed in this encounter.        Assessment and Plan  Elo Haines"Clem" was seen today for follow-up.    Diagnoses and all orders for this visit:    Primary osteoarthritis of both knees  -     EUFLEXXA INJECTION (For Auth/Precert); Future          Patient would be a reasonable candidate for a knee replacement.  Patient will continue with her cardiac rehab which should help her knees also.  We will request viscosupplementation injections.  Follow-up in the office once approved.    I discussed with Elo MATHEWS Kanwalmatthew that her history, symptoms, signs, and imaging are most consistent with knee arthritis.    We reviewed the natural history of these conditions and treatment options ranging from conservative measures (rest, icing, activity modification, physical therapy, pain meds) to surgical options.     In terms of treatment, I recommended continuing with rest, icing, avoidance of painful activities, NSAIDs or pain meds as tolerated, and physical therapy.     We discussed surgical options as well, should conservative measures fail.     Electronically signed by Naeem Rios PA-C on 10/16/2024 at 8:57 AM  This dictation was generated by voice recognition computer software.  Although all attempts are made to edit the dictation for accuracy, there may be errors in the transcription that are not intended.

## 2024-10-18 ENCOUNTER — HOSPITAL ENCOUNTER (OUTPATIENT)
Dept: CARDIAC REHAB | Age: 69
Setting detail: THERAPIES SERIES
Discharge: HOME | End: 2024-10-18
Payer: MEDICARE

## 2024-10-18 PROCEDURE — 93798 PHYS/QHP OP CAR RHAB W/ECG: CPT

## 2024-10-21 ENCOUNTER — HOSPITAL ENCOUNTER (OUTPATIENT)
Dept: CARDIAC REHAB | Age: 69
Setting detail: THERAPIES SERIES
Discharge: HOME | End: 2024-10-21
Payer: MEDICARE

## 2024-10-21 PROCEDURE — 93798 PHYS/QHP OP CAR RHAB W/ECG: CPT

## 2024-10-22 NOTE — PROGRESS NOTES
Diagnosis: Right and left knee osteoarthritis    Procedure: Right and left knee viscosupplementation #1    The patient is symptomatic from osteoarthritis of the right and left knee joint with documented radiological signs of arthritis. The patient has also failed 3 months of conservative treatment including home exercise, education, Tylenol and/or NSAIDs use. The patient was offered a Visco supplementation today. Risks, benefits, and alternatives to the injections were discussed in detail with the patient. The risks discussed included but are not limited to infection, skin reactions, hot swollen joints, and anaphylaxis. The patient gave verbal informed consent for the injection. The patient's skin was prepped with  3 sterile gauze  pads soaked with alcohol solution and the knee joint was injected with 2 mL of  Euflexxa intra-articularly under sterile conditions.    Technique: Under sterile conditions a [employer] ultrasound unit with a variable frequency (6.0-15.0 MHz) linear transducer was used to localize the placement of a 22-gauge needle into the knee joint.    Findings: Successful needle placement for intra-articular Visco supplementation injection.  Final images were taken and saved for permanent record.      The patient tolerated the injection reasonably well. The patient was given instructions to ice the kne and avoid strenuous activities for 24-48 hours. The patient was instructed to call the office immediately if there is increased pain, redness, warmth, fever, or chills. We will see the patient back in one week for their second injection.

## 2024-10-23 ENCOUNTER — OFFICE VISIT (OUTPATIENT)
Dept: ORTHOPEDIC SURGERY | Age: 69
End: 2024-10-23
Payer: MEDICARE

## 2024-10-23 VITALS — HEIGHT: 61 IN | WEIGHT: 213 LBS | BODY MASS INDEX: 40.22 KG/M2

## 2024-10-23 DIAGNOSIS — M17.0 PRIMARY OSTEOARTHRITIS OF BOTH KNEES: Primary | ICD-10-CM

## 2024-10-23 PROCEDURE — 20611 DRAIN/INJ JOINT/BURSA W/US: CPT | Performed by: PHYSICIAN ASSISTANT

## 2024-10-23 RX ORDER — HYALURONATE SODIUM 10 MG/ML
20 SYRINGE (ML) INTRAARTICULAR ONCE
Status: COMPLETED | OUTPATIENT
Start: 2024-10-23 | End: 2024-10-23

## 2024-10-23 RX ADMIN — Medication 20 MG: at 09:19

## 2024-10-25 ENCOUNTER — HOSPITAL ENCOUNTER (OUTPATIENT)
Dept: CARDIAC REHAB | Age: 69
Setting detail: THERAPIES SERIES
Discharge: HOME | End: 2024-10-25
Payer: MEDICARE

## 2024-10-25 PROCEDURE — 93798 PHYS/QHP OP CAR RHAB W/ECG: CPT

## 2024-10-28 ENCOUNTER — HOSPITAL ENCOUNTER (OUTPATIENT)
Dept: CARDIAC REHAB | Age: 69
Setting detail: THERAPIES SERIES
Discharge: HOME | End: 2024-10-28
Payer: MEDICARE

## 2024-10-28 PROCEDURE — 93798 PHYS/QHP OP CAR RHAB W/ECG: CPT

## 2024-10-29 NOTE — PROGRESS NOTES
[facility]   Electrophysiology  Office Visit  Date: 11/13/2024    Chief Complaint   Patient presents with    Atrial Fibrillation    Atrial Flutter    Cardiomyopathy    Congestive Heart Failure    Coronary Artery Disease    Hypertension     Cardiac HX: Elo Paul is a 69 y.o. woman with a h/o HTN, HLD, GERD, polymyositis, Raynaud's, epistaxis w/ R nares cauterization (1/2024), c/o palpitations, 13 day CAM (1/2024) w/ 100% s/s AF/RVR, NSVT, echo (2/2024) EF 30-35% w/ CHET, abnormal MPI (2/2024), s/p LHC (2/2024, Dr. Euceda) w/ mod CAD, no intervention, + DARIEN on CPAP, placed on amio (3/2024), s/p RFA/PVI of AF/AT/tAFL (4/2024, Dr. Muñoz), echo (7/2024) EF 55-60%.     Interval History/HPI: Patient is here for follow-up for paroxysmal atrial fibrillation atrial tachycardia, typical atrial flutter and NICMP.  Patient complained of palpitations in January 2024.  She wore a 13-day CAM at that time that showed 100% symptomatic atrial fibrillation with RVR and short runs of nonsustained VT.  She had an echo that showed her EF was 30 to 35% with biatrial enlargement.  She did have a stress test that was abnormal and underwent a left heart cath in February 2024 that showed moderate CAD.  She had no intervention done at that time.  She was placed on amiodarone.  She had workup for sleep apnea that was positive and is now on a CPAP.  She did undergo an RFA/PVI of AF, AT and typical atrial flutter in April 2024.  After 3 months of normal sinus rhythm she had an echo that showed her EF was 55 to 60%.  Today she presents in NSR with a heart rate of 70 bpm, 1 PVC on EKG.  She remains on Eliquis 5 mg twice a day with no issues of bleeding or dark tarry stools.  She remains on amiodarone 200 mg daily.  She has not felt any breakthrough of her atrial fibrillation.  She denies any heart racing, palpitations or irregular heartbeats.  Her blood pressure is controlled in the office today.  She is on hydralazine 50 mg Q8,  Patient is here with dad, Eitan dad and grandma .    Patient is requesting a school  excuse.    If any information or results need to be relayed from today's visit, the best way to contact the patient is via cell dad [phone] Patient gives verbal permission to leave a detailed voicemail at the number provided.

## 2024-10-30 ENCOUNTER — OFFICE VISIT (OUTPATIENT)
Dept: ORTHOPEDIC SURGERY | Age: 69
End: 2024-10-30
Payer: MEDICARE

## 2024-10-30 VITALS — HEIGHT: 61 IN | WEIGHT: 213 LBS | BODY MASS INDEX: 40.22 KG/M2

## 2024-10-30 DIAGNOSIS — M17.0 PRIMARY OSTEOARTHRITIS OF BOTH KNEES: Primary | ICD-10-CM

## 2024-10-30 PROCEDURE — 20611 DRAIN/INJ JOINT/BURSA W/US: CPT | Performed by: PHYSICIAN ASSISTANT

## 2024-10-30 RX ORDER — HYALURONATE SODIUM 10 MG/ML
20 SYRINGE (ML) INTRAARTICULAR ONCE
Status: COMPLETED | OUTPATIENT
Start: 2024-10-30 | End: 2024-10-30

## 2024-10-30 RX ADMIN — Medication 20 MG: at 09:16

## 2024-10-30 RX ADMIN — Medication 20 MG: at 09:15

## 2024-10-30 NOTE — PROGRESS NOTES
Diagnosis: Right and left knee osteoarthritis    Procedure: Right and left knee viscosupplementation #2    The patient returns today for their second Euflexxa injection in the right and left knee. The risks, benefits, and complications of the injections were again discussed in detail with the patient. The risks discussed included but are not limited to infection, skin reactions, hot swollen joints, and anaphylaxis. The patient gave verbal informed consent for the injection. The patient skin was prepped with 3 sterile gauze pads soaked with alcohol solution and the knee joint was injected with 2 mL of Euflexxa intra-articularly under sterile conditions .     Technique: Under sterile conditions a Son[employer]ferheld ultrasound unit with a variable frequency (6.0-15.0 MHz) linear transducer was used to localize the placement of a 22-gauge needle into the yulissa joint.    Findings: Successful needle placement for intra-articular Visco supplementation injection.  Final images were taken and saved for permanent record.      The patient tolerated the injection reasonably well. The patient was given instructions to ice the the knee and avoid strenuous activities for 24-48 hours. The patient was instructed to call the office immediately if there is increased pain, redness, warmth, fever, or chills. We will see the patient back in one week for the third injection.

## 2024-11-01 ENCOUNTER — HOSPITAL ENCOUNTER (OUTPATIENT)
Dept: CARDIAC REHAB | Age: 69
Setting detail: THERAPIES SERIES
Discharge: HOME | End: 2024-11-01
Payer: MEDICARE

## 2024-11-01 PROCEDURE — 93798 PHYS/QHP OP CAR RHAB W/ECG: CPT

## 2024-11-04 ENCOUNTER — HOSPITAL ENCOUNTER (OUTPATIENT)
Dept: CARDIAC REHAB | Age: 69
Setting detail: THERAPIES SERIES
Discharge: HOME | End: 2024-11-04
Payer: MEDICARE

## 2024-11-04 PROCEDURE — 93798 PHYS/QHP OP CAR RHAB W/ECG: CPT

## 2024-11-06 ENCOUNTER — OFFICE VISIT (OUTPATIENT)
Dept: ORTHOPEDIC SURGERY | Age: 69
End: 2024-11-06
Payer: MEDICARE

## 2024-11-06 VITALS — HEIGHT: 61 IN | BODY MASS INDEX: 40.22 KG/M2 | WEIGHT: 213 LBS

## 2024-11-06 DIAGNOSIS — M17.0 PRIMARY OSTEOARTHRITIS OF BOTH KNEES: Primary | ICD-10-CM

## 2024-11-06 PROCEDURE — 20611 DRAIN/INJ JOINT/BURSA W/US: CPT | Performed by: PHYSICIAN ASSISTANT

## 2024-11-06 RX ORDER — HYALURONATE SODIUM 10 MG/ML
20 SYRINGE (ML) INTRAARTICULAR ONCE
Status: COMPLETED | OUTPATIENT
Start: 2024-11-06 | End: 2024-11-06

## 2024-11-06 RX ADMIN — Medication 20 MG: at 11:20

## 2024-11-06 RX ADMIN — Medication 20 MG: at 11:19

## 2024-11-06 NOTE — PROGRESS NOTES
Diagnosis: Right and left knee osteoarthritis    Procedure: Right and left knee viscosupplementation #3    The patient returns today for their third and final Euflexxa injection in the right and left knee. The risks, benefits, and complications of the injections were again discussed in detail with the patient. The risks discussed include but are not limited to infection, skin reactions, hot swollen joints, and anaphylaxis. The patient gave verbal informed consent for the injection. The patient's skin was prepped with 3 sterile gauze pads soaked with alcohol solution and the knee joint was injected with 2 mL of Euflexxa intra-articularly under sterile conditions.    Technique: Under sterile conditions a Son[employer] ultrasound unit with a variable frequency (6.0-15.0 MHz) linear transducer was used to localize the placement of a 22-gauge needle into the knee joint.    Findings: Successful needle placement for intra-articular Visco supplementation injection.  Final images were taken and saved for permanent record.      The patient tolerated the injection reasonably well. The patient was given instructions to ice of the knee and avoid strenuous activity for 24-48 hours. The patient was instructed to call the office immediately if there is increased pain, redness, warmth, fever, or chills. We will see the patient back on an as-needed basis  from this point.

## 2024-11-13 ENCOUNTER — OFFICE VISIT (OUTPATIENT)
Dept: CARDIOLOGY CLINIC | Age: 69
End: 2024-11-13

## 2024-11-13 VITALS — WEIGHT: 200 LBS | DIASTOLIC BLOOD PRESSURE: 80 MMHG | BODY MASS INDEX: 37.79 KG/M2 | SYSTOLIC BLOOD PRESSURE: 146 MMHG

## 2024-11-13 DIAGNOSIS — I47.19 ATRIAL TACHYCARDIA (HCC): ICD-10-CM

## 2024-11-13 DIAGNOSIS — Z79.01 ON CONTINUOUS ORAL ANTICOAGULATION: ICD-10-CM

## 2024-11-13 DIAGNOSIS — Z79.899 ON AMIODARONE THERAPY: ICD-10-CM

## 2024-11-13 DIAGNOSIS — I48.3 TYPICAL ATRIAL FLUTTER (HCC): ICD-10-CM

## 2024-11-13 DIAGNOSIS — I10 BENIGN ESSENTIAL HTN: ICD-10-CM

## 2024-11-13 DIAGNOSIS — I50.22 CHRONIC SYSTOLIC CHF (CONGESTIVE HEART FAILURE) (HCC): ICD-10-CM

## 2024-11-13 DIAGNOSIS — I48.0 PAROXYSMAL ATRIAL FIBRILLATION (HCC): Primary | ICD-10-CM

## 2024-11-13 DIAGNOSIS — I42.8 NICM (NONISCHEMIC CARDIOMYOPATHY) (HCC): ICD-10-CM

## 2024-11-13 DIAGNOSIS — I25.10 CORONARY ARTERY DISEASE INVOLVING NATIVE CORONARY ARTERY OF NATIVE HEART WITHOUT ANGINA PECTORIS: ICD-10-CM

## 2024-11-13 DIAGNOSIS — I48.91 ATRIAL FIBRILLATION WITH RAPID VENTRICULAR RESPONSE (HCC): ICD-10-CM

## 2024-11-13 PROBLEM — R91.1 PULMONARY NODULE: Status: ACTIVE | Noted: 2024-11-13

## 2024-11-13 LAB
ALBUMIN SERPL-MCNC: 5.1 G/DL (ref 3.4–5)
ALP SERPL-CCNC: 97 U/L (ref 40–129)
ALT SERPL-CCNC: 26 U/L (ref 10–40)
ANION GAP SERPL CALCULATED.3IONS-SCNC: 14 MMOL/L (ref 3–16)
AST SERPL-CCNC: 22 U/L (ref 15–37)
BILIRUB DIRECT SERPL-MCNC: 0.7 MG/DL (ref 0–0.3)
BILIRUB INDIRECT SERPL-MCNC: 1 MG/DL (ref 0–1)
BILIRUB SERPL-MCNC: 1.7 MG/DL (ref 0–1)
BUN SERPL-MCNC: 11 MG/DL (ref 7–20)
CALCIUM SERPL-MCNC: 9.6 MG/DL (ref 8.3–10.6)
CHLORIDE SERPL-SCNC: 91 MMOL/L (ref 99–110)
CO2 SERPL-SCNC: 25 MMOL/L (ref 21–32)
CREAT SERPL-MCNC: 0.7 MG/DL (ref 0.6–1.2)
GFR SERPLBLD CREATININE-BSD FMLA CKD-EPI: >90 ML/MIN/{1.73_M2}
GLUCOSE SERPL-MCNC: 104 MG/DL (ref 70–99)
POTASSIUM SERPL-SCNC: 4.2 MMOL/L (ref 3.5–5.1)
PROT SERPL-MCNC: 7.3 G/DL (ref 6.4–8.2)
SODIUM SERPL-SCNC: 130 MMOL/L (ref 136–145)
TSH SERPL DL<=0.005 MIU/L-ACNC: 4.65 UIU/ML (ref 0.27–4.2)

## 2024-11-13 RX ORDER — AMIODARONE HYDROCHLORIDE 200 MG/1
100 TABLET ORAL DAILY
Qty: 90 TABLET | Refills: 3 | Status: SHIPPED | OUTPATIENT
Start: 2024-11-13

## 2024-11-14 LAB — T4 FREE SERPL-MCNC: 1.7 NG/DL (ref 0.9–1.8)

## 2024-11-25 ENCOUNTER — OFFICE VISIT (OUTPATIENT)
Age: 69
End: 2024-11-25
Payer: MEDICARE

## 2024-11-25 VITALS
DIASTOLIC BLOOD PRESSURE: 78 MMHG | OXYGEN SATURATION: 91 % | HEIGHT: 61 IN | BODY MASS INDEX: 37.46 KG/M2 | WEIGHT: 198.41 LBS | SYSTOLIC BLOOD PRESSURE: 138 MMHG | HEART RATE: 68 BPM

## 2024-11-25 DIAGNOSIS — G47.33 OSA ON CPAP: Primary | ICD-10-CM

## 2024-11-25 DIAGNOSIS — I10 HYPERTENSION, UNSPECIFIED TYPE: ICD-10-CM

## 2024-11-25 DIAGNOSIS — I48.19 PERSISTENT ATRIAL FIBRILLATION (HCC): ICD-10-CM

## 2024-11-25 PROCEDURE — 1123F ACP DISCUSS/DSCN MKR DOCD: CPT | Performed by: STUDENT IN AN ORGANIZED HEALTH CARE EDUCATION/TRAINING PROGRAM

## 2024-11-25 PROCEDURE — 1159F MED LIST DOCD IN RCRD: CPT | Performed by: STUDENT IN AN ORGANIZED HEALTH CARE EDUCATION/TRAINING PROGRAM

## 2024-11-25 PROCEDURE — 3075F SYST BP GE 130 - 139MM HG: CPT | Performed by: STUDENT IN AN ORGANIZED HEALTH CARE EDUCATION/TRAINING PROGRAM

## 2024-11-25 PROCEDURE — 3078F DIAST BP <80 MM HG: CPT | Performed by: STUDENT IN AN ORGANIZED HEALTH CARE EDUCATION/TRAINING PROGRAM

## 2024-11-25 PROCEDURE — 99214 OFFICE O/P EST MOD 30 MIN: CPT | Performed by: STUDENT IN AN ORGANIZED HEALTH CARE EDUCATION/TRAINING PROGRAM

## 2024-11-25 PROCEDURE — G2211 COMPLEX E/M VISIT ADD ON: HCPCS | Performed by: STUDENT IN AN ORGANIZED HEALTH CARE EDUCATION/TRAINING PROGRAM

## 2024-11-25 ASSESSMENT — SLEEP AND FATIGUE QUESTIONNAIRES
HOW LIKELY ARE YOU TO NOD OFF OR FALL ASLEEP WHILE LYING DOWN TO REST IN THE AFTERNOON WHEN CIRCUMSTANCES PERMIT: MODERATE CHANCE OF DOZING
HOW LIKELY ARE YOU TO NOD OFF OR FALL ASLEEP WHILE WATCHING TV: MODERATE CHANCE OF DOZING
HOW LIKELY ARE YOU TO NOD OFF OR FALL ASLEEP WHILE SITTING AND READING: MODERATE CHANCE OF DOZING
HOW LIKELY ARE YOU TO NOD OFF OR FALL ASLEEP WHILE SITTING INACTIVE IN A PUBLIC PLACE: SLIGHT CHANCE OF DOZING
HOW LIKELY ARE YOU TO NOD OFF OR FALL ASLEEP IN A CAR, WHILE STOPPED FOR A FEW MINUTES IN TRAFFIC: WOULD NEVER DOZE
HOW LIKELY ARE YOU TO NOD OFF OR FALL ASLEEP WHILE SITTING QUIETLY AFTER LUNCH WITHOUT ALCOHOL: WOULD NEVER DOZE
ESS TOTAL SCORE: 7
HOW LIKELY ARE YOU TO NOD OFF OR FALL ASLEEP WHEN YOU ARE A PASSENGER IN A CAR FOR AN HOUR WITHOUT A BREAK: WOULD NEVER DOZE
HOW LIKELY ARE YOU TO NOD OFF OR FALL ASLEEP WHILE SITTING AND TALKING TO SOMEONE: WOULD NEVER DOZE

## 2024-11-25 NOTE — PROGRESS NOTES
level than the bed.  I discussed the importance of regular continued nightly use of the PAP machine.  She should never drive if drowsy and should pull over at a safe place if she becomes drowsy while driving.  She should avoid respiratory suppressants as they could worsen nocturnal hypoxemia and sleep disordered breathing.  Obesity/overweight BMI: Weight loss is associated with improvement in sleep disordered breathing. Elo Paul should exercise regularly and watch her diet.  Return in about 1 year (around 11/25/2025) for DARIEN follow up.  She is to contact me if she has any questions prior to that time.      Keven Miller MD   Sleep Medicine    11/25/24    This dictation was generated by voice recognition computer software.  Although all attempts are made to edit the dictation for accuracy, there may be errors in the transcription that are not intended.

## 2024-11-26 ENCOUNTER — OFFICE VISIT (OUTPATIENT)
Dept: FAMILY MEDICINE CLINIC | Age: 69
End: 2024-11-26

## 2024-11-26 VITALS
HEIGHT: 61 IN | SYSTOLIC BLOOD PRESSURE: 126 MMHG | WEIGHT: 199 LBS | OXYGEN SATURATION: 100 % | HEART RATE: 71 BPM | BODY MASS INDEX: 37.57 KG/M2 | DIASTOLIC BLOOD PRESSURE: 64 MMHG

## 2024-11-26 DIAGNOSIS — I73.00 RAYNAUD'S DISEASE WITHOUT GANGRENE: ICD-10-CM

## 2024-11-26 DIAGNOSIS — Z13.820 SCREENING FOR OSTEOPOROSIS: ICD-10-CM

## 2024-11-26 DIAGNOSIS — Z00.00 WELL ADULT EXAM: ICD-10-CM

## 2024-11-26 DIAGNOSIS — I50.22 CHRONIC SYSTOLIC CHF (CONGESTIVE HEART FAILURE) (HCC): ICD-10-CM

## 2024-11-26 DIAGNOSIS — Z78.0 MENOPAUSE: ICD-10-CM

## 2024-11-26 DIAGNOSIS — I42.8 NICM (NONISCHEMIC CARDIOMYOPATHY) (HCC): ICD-10-CM

## 2024-11-26 DIAGNOSIS — E78.2 MIXED HYPERLIPIDEMIA: ICD-10-CM

## 2024-11-26 DIAGNOSIS — I10 HYPERTENSION, UNSPECIFIED TYPE: ICD-10-CM

## 2024-11-26 DIAGNOSIS — E55.9 VITAMIN D DEFICIENCY: ICD-10-CM

## 2024-11-26 DIAGNOSIS — M17.0 PRIMARY OSTEOARTHRITIS OF BOTH KNEES: ICD-10-CM

## 2024-11-26 DIAGNOSIS — M33.20 POLYMYOSITIS (HCC): ICD-10-CM

## 2024-11-26 DIAGNOSIS — I48.0 PAROXYSMAL ATRIAL FIBRILLATION (HCC): ICD-10-CM

## 2024-11-26 DIAGNOSIS — Z12.11 COLON CANCER SCREENING: ICD-10-CM

## 2024-11-26 DIAGNOSIS — Z00.00 MEDICARE ANNUAL WELLNESS VISIT, SUBSEQUENT: Primary | ICD-10-CM

## 2024-11-26 DIAGNOSIS — Z92.241 HISTORY OF RECENT STEROID USE: ICD-10-CM

## 2024-11-26 DIAGNOSIS — G47.33 OSA ON CPAP: ICD-10-CM

## 2024-11-26 PROBLEM — I48.19 PERSISTENT ATRIAL FIBRILLATION (HCC): Status: RESOLVED | Noted: 2024-02-26 | Resolved: 2024-11-26

## 2024-11-26 PROBLEM — G47.10 HYPERSOMNIA: Status: RESOLVED | Noted: 2024-02-28 | Resolved: 2024-11-26

## 2024-11-26 PROBLEM — I48.91 ATRIAL FIBRILLATION WITH RAPID VENTRICULAR RESPONSE (HCC): Status: RESOLVED | Noted: 2024-02-26 | Resolved: 2024-11-26

## 2024-11-26 LAB
25(OH)D3 SERPL-MCNC: 48.7 NG/ML
ALBUMIN SERPL-MCNC: 4.5 G/DL (ref 3.4–5)
ALBUMIN/GLOB SERPL: 2.5 {RATIO} (ref 1.1–2.2)
ALP SERPL-CCNC: 101 U/L (ref 40–129)
ALT SERPL-CCNC: 26 U/L (ref 10–40)
ANION GAP SERPL CALCULATED.3IONS-SCNC: 12 MMOL/L (ref 3–16)
AST SERPL-CCNC: 22 U/L (ref 15–37)
BASOPHILS # BLD: 0.1 K/UL (ref 0–0.2)
BASOPHILS NFR BLD: 1 %
BILIRUB SERPL-MCNC: 1.3 MG/DL (ref 0–1)
BUN SERPL-MCNC: 14 MG/DL (ref 7–20)
CALCIUM SERPL-MCNC: 9.5 MG/DL (ref 8.3–10.6)
CHLORIDE SERPL-SCNC: 92 MMOL/L (ref 99–110)
CHOLEST SERPL-MCNC: 142 MG/DL (ref 0–199)
CO2 SERPL-SCNC: 24 MMOL/L (ref 21–32)
CREAT SERPL-MCNC: 0.7 MG/DL (ref 0.6–1.2)
DEPRECATED RDW RBC AUTO: 15.7 % (ref 12.4–15.4)
EOSINOPHIL # BLD: 0.1 K/UL (ref 0–0.6)
EOSINOPHIL NFR BLD: 0.9 %
GFR SERPLBLD CREATININE-BSD FMLA CKD-EPI: >90 ML/MIN/{1.73_M2}
GLUCOSE SERPL-MCNC: 96 MG/DL (ref 70–99)
HCT VFR BLD AUTO: 35.2 % (ref 36–48)
HDLC SERPL-MCNC: 70 MG/DL (ref 40–60)
HGB BLD-MCNC: 11.9 G/DL (ref 12–16)
LDLC SERPL CALC-MCNC: 61 MG/DL
LYMPHOCYTES # BLD: 1 K/UL (ref 1–5.1)
LYMPHOCYTES NFR BLD: 15.3 %
MCH RBC QN AUTO: 31 PG (ref 26–34)
MCHC RBC AUTO-ENTMCNC: 33.7 G/DL (ref 31–36)
MCV RBC AUTO: 91.9 FL (ref 80–100)
MONOCYTES # BLD: 0.6 K/UL (ref 0–1.3)
MONOCYTES NFR BLD: 9.8 %
NEUTROPHILS # BLD: 4.9 K/UL (ref 1.7–7.7)
NEUTROPHILS NFR BLD: 73 %
PLATELET # BLD AUTO: 327 K/UL (ref 135–450)
PMV BLD AUTO: 7.9 FL (ref 5–10.5)
POTASSIUM SERPL-SCNC: 4.4 MMOL/L (ref 3.5–5.1)
PROT SERPL-MCNC: 6.3 G/DL (ref 6.4–8.2)
RBC # BLD AUTO: 3.83 M/UL (ref 4–5.2)
SODIUM SERPL-SCNC: 128 MMOL/L (ref 136–145)
TRIGL SERPL-MCNC: 55 MG/DL (ref 0–150)
VLDLC SERPL CALC-MCNC: 11 MG/DL
WBC # BLD AUTO: 6.6 K/UL (ref 4–11)

## 2024-11-26 ASSESSMENT — LIFESTYLE VARIABLES
HOW MANY STANDARD DRINKS CONTAINING ALCOHOL DO YOU HAVE ON A TYPICAL DAY: 1 OR 2
HOW OFTEN DO YOU HAVE A DRINK CONTAINING ALCOHOL: MONTHLY OR LESS

## 2024-11-26 ASSESSMENT — PATIENT HEALTH QUESTIONNAIRE - PHQ9
SUM OF ALL RESPONSES TO PHQ QUESTIONS 1-9: 0
2. FEELING DOWN, DEPRESSED OR HOPELESS: NOT AT ALL
SUM OF ALL RESPONSES TO PHQ QUESTIONS 1-9: 0
SUM OF ALL RESPONSES TO PHQ9 QUESTIONS 1 & 2: 0
SUM OF ALL RESPONSES TO PHQ QUESTIONS 1-9: 0
SUM OF ALL RESPONSES TO PHQ QUESTIONS 1-9: 0
1. LITTLE INTEREST OR PLEASURE IN DOING THINGS: NOT AT ALL

## 2024-11-26 NOTE — ASSESSMENT & PLAN NOTE
Monitored by specialist- no acute findings meriting change in the plan--stable with current plan--needs DEXA

## 2024-11-26 NOTE — PATIENT INSTRUCTIONS
margarine, and shortening--you eat. Use olive, peanut, or canola oil when you cook. Bake, broil, and steam foods instead of frying them.     Eat a variety of fruit and vegetables every day. Dark green, deep orange, red, or yellow fruits and vegetables are especially good for you. Examples include spinach, carrots, peaches, and berries.     Foods high in fiber can reduce your cholesterol and provide important vitamins and minerals. High-fiber foods include whole-grain cereals and breads, oatmeal, beans, brown rice, citrus fruits, and apples.     Eat lean proteins. Heart-healthy proteins include seafood, lean meats and poultry, eggs, beans, peas, nuts, seeds, and soy products.     Limit drinks and foods with added sugar. These include candy, desserts, and soda pop.   Heart-healthy lifestyle    If your doctor recommends it, get more exercise. For many people, walking is a good choice. Or you may want to swim, bike, or do other activities. Bit by bit, increase the time you're active every day. Try for at least 30 minutes on most days of the week.     Try to quit or cut back on using tobacco and other nicotine products. This includes smoking and vaping. If you need help quitting, talk to your doctor about stop-smoking programs and medicines. These can increase your chances of quitting for good. Quitting is one of the most important things you can do to protect your heart. It is never too late to quit. Try to avoid secondhand smoke too.     Stay at a weight that's healthy for you. Talk to your doctor if you need help losing weight.     Try to get 7 to 9 hours of sleep each night.     Limit alcohol to 2 drinks a day for men and 1 drink a day for women. Too much alcohol can cause health problems.     Manage other health problems such as diabetes, high blood pressure, and high cholesterol. If you think you may have a problem with alcohol or drug use, talk to your doctor.   Medicines    Take your medicines exactly as

## 2024-11-26 NOTE — PROGRESS NOTES
Medicare Annual Wellness Visit    Elo Paul is here for Medicare AWV    Assessment & Plan   Chronic systolic CHF (congestive heart failure) (HCC)  Assessment & Plan:   Chronic, at goal (stable), continue current treatment plan  Polymyositis ([facility])  Assessment & Plan:   Monitored by specialist- no acute findings meriting change in the plan--stable with current plan--needs DEXA  Orders:  -     DEXA BONE DENSITY 2 SITES; Future  NICM (nonischemic cardiomyopathy) ([facility])  Assessment & Plan:   Chronic, at goal (stable), continue current treatment plan  Paroxysmal atrial fibrillation ([facility])  Assessment & Plan:   Chronic, at goal (stable), continue current treatment plan  Screening for osteoporosis  -     DEXA BONE DENSITY 2 SITES; Future  History of recent steroid use  -     DEXA BONE DENSITY 2 SITES; Future  Menopause  -     DEXA BONE DENSITY 2 SITES; Future  Colon cancer screening  DARIEN on CPAP  Assessment & Plan:   Chronic, at goal (stable), continue current treatment plan  Hypertension, unspecified type  Assessment & Plan:   Chronic, at goal (stable), continue current treatment plan  Mixed hyperlipidemia  Assessment & Plan:   Chronic, at goal (stable), continue current treatment plan-TOS/SE discussed  Primary osteoarthritis of both knees  Assessment & Plan:   Chronic, at goal (stable), -no NSAIDs  Raynaud's disease without gangrene  Assessment & Plan:   Chronic, not at goal (unstable), continue current treatment plan  Well adult exam  Assessment & Plan:    labs --cologuard    Recommendations for Preventive Services Due: see orders and patient instructions/AVS.  Recommended screening schedule for the next 5-10 years is provided to the patient in written form: see Patient Instructions/AVS.     No follow-ups on file.     Subjective   The following acute and/or chronic problems were also addressed today:  Chronic systolic CHF (congestive heart failure) (HCC)   Chronic, at goal (stable), continue current treatment

## 2024-11-27 DIAGNOSIS — D50.9 HYPOCHROMIC ANEMIA: ICD-10-CM

## 2024-11-27 DIAGNOSIS — D50.9 HYPOCHROMIC ANEMIA: Primary | ICD-10-CM

## 2024-11-27 LAB
IRON SATN MFR SERPL: 24 % (ref 15–50)
IRON SERPL-MCNC: 74 UG/DL (ref 37–145)
TIBC SERPL-MCNC: 311 UG/DL (ref 260–445)

## 2024-12-03 ENCOUNTER — PATIENT MESSAGE (OUTPATIENT)
Dept: CARDIOLOGY CLINIC | Age: 69
End: 2024-12-03

## 2024-12-03 NOTE — TELEPHONE ENCOUNTER
Can we find out exactly how much furosemide she is taking.  She was taking it every other day and I had her go up to taking it daily.  I do not know if she is still taking it daily or not.  If she doing daily weights?  She needs to be weighing herself at the same time every day wearing the same amount of clothing.

## 2024-12-04 DIAGNOSIS — I50.32 CHRONIC DIASTOLIC CHF (CONGESTIVE HEART FAILURE) (HCC): Primary | ICD-10-CM

## 2024-12-04 DIAGNOSIS — I50.22 CHRONIC SYSTOLIC CHF (CONGESTIVE HEART FAILURE) (HCC): Primary | ICD-10-CM

## 2024-12-04 RX ORDER — FUROSEMIDE 20 MG/1
20 TABLET ORAL DAILY
Qty: 90 TABLET | Refills: 3 | Status: SHIPPED | OUTPATIENT
Start: 2024-12-04

## 2024-12-04 NOTE — TELEPHONE ENCOUNTER
Please have her take furosemide 20 mg daily.  I sent in a prescription for 20 mg tablets 1 p.o. daily #90 with 3 refills.  I would also like her to get a BMP in a week.  I would like her to monitor her weight daily at the same time wearing the same amount of clothing.  She needs to limit her fluid to 1500 mL daily, cut back on her salt.  I will also check a BNP.  Have her touch base in a week or so.

## 2024-12-04 NOTE — TELEPHONE ENCOUNTER
Spoke with patient, she is taking Furosemide 20mg qod. Patient states that she has been recording her weights-which is 203-204 lbs-previous.  Previously she was at 198-197 lbs (before Thanksgiving).  She does state that her hands are swollen in am.  Feels she is retaining fluids.  She says previously she was taking 20mg daily-with cutting 40mg tab in half-which she felt made accurate dosing very challenging.  Current dose of Lasix 20mg qod.

## 2024-12-05 NOTE — TELEPHONE ENCOUNTER
Spoke with patient, reiterated the changes of dosage-patient to take 20mg Furosemide daily, continue to weigh herself with same comparable clothing and at same time daily.  Recheck labs in 1 week.  Watch salt and fluid intake keeping it to 1500 ml daily.  She will update our office in a week or so as to how she is feeling.     Detail Level: Simple Price (Do Not Change): 0.00 Instructions: This plan will send the code FBSE to the PM system.  DO NOT or CHANGE the price.

## 2024-12-09 RX ORDER — METOPROLOL SUCCINATE 25 MG/1
TABLET, EXTENDED RELEASE ORAL
Qty: 180 TABLET | Refills: 3 | Status: SHIPPED | OUTPATIENT
Start: 2024-12-09

## 2024-12-09 NOTE — TELEPHONE ENCOUNTER
Requested Prescriptions     Pending Prescriptions Disp Refills    metoprolol succinate (TOPROL XL) 25 MG extended release tablet [Pharmacy Med Name: METOPROLOL SUCC ER 25 MG TAB] 180 tablet 3     Sig: TAKE 1 TABLET BY MOUTH TWICE A DAY IN THE MORNING AND IN THE EVENING            Checked Correct Pharmacy: Yes    Any changes since last refill? No     Number: 180    Refills: 3    Last Office Visit: 11/13/2024     Next Office Visit: 2/28/2025         Last Labs: 11.26.2024

## 2024-12-11 DIAGNOSIS — I50.22 CHRONIC SYSTOLIC CHF (CONGESTIVE HEART FAILURE) (HCC): ICD-10-CM

## 2024-12-11 DIAGNOSIS — I50.32 CHRONIC DIASTOLIC CHF (CONGESTIVE HEART FAILURE) (HCC): ICD-10-CM

## 2024-12-11 LAB
ANION GAP SERPL CALCULATED.3IONS-SCNC: 15 MMOL/L (ref 3–16)
BUN SERPL-MCNC: 13 MG/DL (ref 7–20)
CALCIUM SERPL-MCNC: 9.2 MG/DL (ref 8.3–10.6)
CHLORIDE SERPL-SCNC: 94 MMOL/L (ref 99–110)
CO2 SERPL-SCNC: 21 MMOL/L (ref 21–32)
CREAT SERPL-MCNC: 0.7 MG/DL (ref 0.6–1.2)
GFR SERPLBLD CREATININE-BSD FMLA CKD-EPI: >90 ML/MIN/{1.73_M2}
GLUCOSE SERPL-MCNC: 102 MG/DL (ref 70–99)
NT-PROBNP SERPL-MCNC: 316 PG/ML (ref 0–124)
POTASSIUM SERPL-SCNC: 5.1 MMOL/L (ref 3.5–5.1)
SODIUM SERPL-SCNC: 130 MMOL/L (ref 136–145)

## 2024-12-18 ENCOUNTER — OFFICE VISIT (OUTPATIENT)
Dept: ORTHOPEDIC SURGERY | Age: 69
End: 2024-12-18
Payer: MEDICARE

## 2024-12-18 VITALS — HEIGHT: 61 IN | BODY MASS INDEX: 37.57 KG/M2 | WEIGHT: 199 LBS

## 2024-12-18 DIAGNOSIS — M17.12 PRIMARY OSTEOARTHRITIS OF LEFT KNEE: Primary | ICD-10-CM

## 2024-12-18 DIAGNOSIS — Z01.818 PRE-OP TESTING: ICD-10-CM

## 2024-12-18 PROCEDURE — 1123F ACP DISCUSS/DSCN MKR DOCD: CPT | Performed by: PHYSICIAN ASSISTANT

## 2024-12-18 PROCEDURE — 1159F MED LIST DOCD IN RCRD: CPT | Performed by: PHYSICIAN ASSISTANT

## 2024-12-18 PROCEDURE — 99215 OFFICE O/P EST HI 40 MIN: CPT | Performed by: PHYSICIAN ASSISTANT

## 2024-12-18 NOTE — PROGRESS NOTES
including tylenol and NSAIDs. They can try glucosamine or chondroitin. They should also ice frequently and avoid activities that make their knee hurt. Cortisone injections and Synvisc injections are also options when medicine has failed. We finally discussed surgical options including arthroscopic debridement versus knee replacement. Often the arthritis is too far gone for an arthroscopic debridement and pain relief will be short term. Their ultimate solution will be a knee replacement when they are ready for it. They should put it off until they can no longer stand the pain and when nothing else has worked.     Conservative measures have failed. She is not interested in cortisone injections.  I think she is an appropriate candidate for surgery due to her ongoing symptoms and dysfunction despite conservative measures.     The procedure would be Left  62859 Total Knee Arthroplasty      Perioperative considerations include:  Cardiac and rheumatology, Chronic anticoagulation other than Aspirin, and Pre-operative clearance from medical subspecialty.    We reviewed the risks, benefits, alternatives of this approach. We discussed risks including, but not limited to, bleeding, pain, infection, scarring, damage to the neurovascular structures, blood clots, pulmonary embolus, stiffness, implant instability or loosening, implant failure, incomplete relief of pain, and incomplete return of function.    We also reviewed the surgical details, expected recovery, and rehabilitation (6-9 months).    She expressed understanding and will undergo preoperative medical evaluation and optimization.   Electronically signed by Naeem Rios PA-C on 12/18/2024 at 9:31 AM  This dictation was generated by voice recognition computer software.  Although all attempts are made to edit the dictation for accuracy, there may be errors in the transcription that are not intended.

## 2024-12-23 LAB — NONINV COLON CA DNA+OCC BLD SCRN STL QL: NEGATIVE

## 2024-12-26 PROBLEM — Z00.00 WELL ADULT EXAM: Status: RESOLVED | Noted: 2019-03-07 | Resolved: 2024-12-26

## 2025-01-13 ENCOUNTER — HOSPITAL ENCOUNTER (OUTPATIENT)
Dept: GENERAL RADIOLOGY | Age: 70
Discharge: HOME | End: 2025-01-13
Attending: FAMILY MEDICINE
Payer: MEDICARE

## 2025-01-13 DIAGNOSIS — Z13.820 SCREENING FOR OSTEOPOROSIS: ICD-10-CM

## 2025-01-13 DIAGNOSIS — Z78.0 MENOPAUSE: ICD-10-CM

## 2025-01-13 DIAGNOSIS — Z92.241 HISTORY OF RECENT STEROID USE: ICD-10-CM

## 2025-01-13 DIAGNOSIS — M33.20 POLYMYOSITIS (HCC): ICD-10-CM

## 2025-01-13 PROCEDURE — 77080 DXA BONE DENSITY AXIAL: CPT

## 2025-02-11 DIAGNOSIS — I42.8 NICM (NONISCHEMIC CARDIOMYOPATHY) (HCC): ICD-10-CM

## 2025-02-11 DIAGNOSIS — I48.0 PAROXYSMAL ATRIAL FIBRILLATION (HCC): Primary | ICD-10-CM

## 2025-02-11 RX ORDER — APIXABAN 5 MG/1
5 TABLET, FILM COATED ORAL 2 TIMES DAILY
Qty: 180 TABLET | Refills: 3 | Status: SHIPPED | OUTPATIENT
Start: 2025-02-11

## 2025-02-11 NOTE — TELEPHONE ENCOUNTER
Requested Prescriptions     Pending Prescriptions Disp Refills    apixaban (ELIQUIS) 5 MG TABS tablet [Pharmacy Med Name: ELIQUIS 5 MG TABLET] 180 tablet 3     Sig: TAKE 1 TABLET BY MOUTH TWICE A DAY            Checked Correct Pharmacy: Yes    Any changes since last refill? No     Number: 180  Refills: 3    Last OV: 11/13/2024 Provider: MAIRA    Next OV: 2/28/2025 Provider: SHANTAL    Last Labs:   CBC:   Lab Results   Component Value Date    WBC 6.6 11/26/2024    HGB 11.9 (L) 11/26/2024    HCT 35.2 (L) 11/26/2024    MCV 91.9 11/26/2024     11/26/2024     BMP:   Lab Results   Component Value Date/Time     12/11/2024 11:41 AM    K 5.1 12/11/2024 11:41 AM    K 3.4 02/28/2024 05:08 AM    CL 94 12/11/2024 11:41 AM    CO2 21 12/11/2024 11:41 AM    BUN 13 12/11/2024 11:41 AM    CREATININE 0.7 12/11/2024 11:41 AM    GLUCOSE 102 12/11/2024 11:41 AM    CALCIUM 9.2 12/11/2024 11:41 AM    LABGLOM >90 12/11/2024 11:41 AM    LABGLOM >90 03/27/2024 10:47 AM       Liver:   Lab Results   Component Value Date    ALT 26 11/26/2024    AST 22 11/26/2024    ALKPHOS 101 11/26/2024    BILITOT 1.3 (H) 11/26/2024      BNP: No results found for: \"BNP\"

## 2025-02-13 RX ORDER — FAMOTIDINE 20 MG/1
TABLET, FILM COATED ORAL
Qty: 180 TABLET | Refills: 3 | Status: SHIPPED | OUTPATIENT
Start: 2025-02-13

## 2025-02-23 ENCOUNTER — PATIENT MESSAGE (OUTPATIENT)
Dept: CARDIOLOGY CLINIC | Age: 70
End: 2025-02-23

## 2025-02-24 DIAGNOSIS — E78.2 MIXED HYPERLIPIDEMIA: Primary | ICD-10-CM

## 2025-02-24 RX ORDER — METOPROLOL SUCCINATE 25 MG/1
25 TABLET, EXTENDED RELEASE ORAL 2 TIMES DAILY
Qty: 180 TABLET | Refills: 3 | Status: SHIPPED | OUTPATIENT
Start: 2025-02-24

## 2025-02-24 NOTE — TELEPHONE ENCOUNTER
Last OV 11/13/24  Next OV 2/28/25    Your note says Toprol 25 mg daily, but pt has been on Toprol 25 mg BID (at least since OV with UL in 5/2024)

## 2025-02-26 NOTE — PATIENT INSTRUCTIONS
Thank you for choosing [facility] for your cardiac care.    During your visit today, we reviewed and confirmed your cardiac medications along with  medication prescribed by your other healthcare team members. Please be sure to discuss any  changes to medication with your providers.    Please bring a list of ALL medications (or the bottles) with you to EVERY appointment.  Also include vitamins and over-the-counter medications.    If you need refills for any cardiac medications, please call your pharmacy and they will reach out to us electronically.    Did your provider order testing today? If yes, then you will receive your results in three  possible ways. You can receive a Phonetime message, a phone call, or letter in the mail. Please  note, if you are an active Phonetime user, some of your testing will be available within 1-2 days.    Finally, please know that it is good for your heart to exercise and follow a healthy, low-fat diet  as advised by your physician and health care providers.    If you are experiencing a medical emergency, please call 911 immediately.    It's easy to register for a Phonetime account if you don't already have one. With a Phonetime  account you can manage your health record, view test results, schedule appointments and  more.     Dr. Irwin's clinical staff can be reached at the following phone number: [phone]    If any cardiac testing is ordered, please contact central scheduling at [phone] to get your test scheduled.      You may hold your Eliquis for up to a week until the bruising on your lower leg improves.    Get fasting lab work

## 2025-02-26 NOTE — PROGRESS NOTES
benign    CATARACT EXTRACTION      CEI    MUSCLE BIOPSY Left 10/08/2019    LEFT DELTOID MUSCLE BIOPSY, LEFT QUADRICEP MUSCLE BIOPSY performed by Lucho Genao DO at [facility] OR        Social HIstory  Social History     Tobacco Use    Smoking status: Former     Current packs/day: 0.00     Average packs/day: 2.0 packs/day for 15.0 years (30.0 ttl pk-yrs)     Types: Cigarettes     Start date: 3/20/1990     Quit date: 3/20/2005     Years since quittin.9    Smokeless tobacco: Never   Vaping Use    Vaping status: Never Used   Substance Use Topics    Alcohol use: Yes     Comment: 2 per night    Drug use: Never       Family History  Family History   Problem Relation Age of Onset    Breast Cancer Mother 48       Allergies   No Known Allergies    Medications:     Home Medications:  Were reviewed and are listed in nursing record and/or listed below    Prior to Admission medications    Medication Sig Start Date End Date Taking? Authorizing Provider   metoprolol succinate (TOPROL XL) 25 MG extended release tablet Take 1 tablet by mouth in the morning and at bedtime 25  Yes Veronica Bennett APRN - CNP   famotidine (PEPCID) 20 MG tablet TAKE 1 TABLET BY MOUTH TWICE A DAY 25  Yes Bharath Yadav MD   ELIQUIS 5 MG TABS tablet TAKE 1 TABLET BY MOUTH TWICE A DAY 25  Yes Veronica Bennett APRN - CNP   apixaban (ELIQUIS) 5 MG TABS tablet Take 1 tablet by mouth 2 times daily 25  Yes Veronica Bennett APRN - CNP   furosemide (LASIX) 20 MG tablet Take 1 tablet by mouth daily 24  Yes Veronica Bennett APRN - CNP   amiodarone (CORDARONE) 200 MG tablet Take 0.5 tablets by mouth daily 24  Yes Veronica Bennett APRN - CNP   spironolactone (ALDACTONE) 25 MG tablet TAKE 1/2 TABLET BY MOUTH DAILY 24  Yes Orquidea Betancourt APRN - CNP   atorvastatin (LIPITOR) 40 MG tablet Take 1 tablet by mouth daily 24  Yes Usman Irwin MD   hydrALAZINE (APRESOLINE) 50 MG tablet Take 1 tablet by mouth every 8 hours

## 2025-02-28 ENCOUNTER — OFFICE VISIT (OUTPATIENT)
Dept: CARDIOLOGY CLINIC | Age: 70
End: 2025-02-28

## 2025-02-28 VITALS
SYSTOLIC BLOOD PRESSURE: 134 MMHG | BODY MASS INDEX: 36.28 KG/M2 | HEART RATE: 68 BPM | DIASTOLIC BLOOD PRESSURE: 78 MMHG | WEIGHT: 192 LBS

## 2025-02-28 DIAGNOSIS — I10 HYPERTENSION, UNSPECIFIED TYPE: Primary | ICD-10-CM

## 2025-02-28 DIAGNOSIS — Z79.899 ON AMIODARONE THERAPY: ICD-10-CM

## 2025-02-28 DIAGNOSIS — I48.0 PAROXYSMAL ATRIAL FIBRILLATION (HCC): ICD-10-CM

## 2025-03-03 DIAGNOSIS — E78.2 MIXED HYPERLIPIDEMIA: ICD-10-CM

## 2025-03-03 DIAGNOSIS — I10 HYPERTENSION, UNSPECIFIED TYPE: ICD-10-CM

## 2025-03-03 DIAGNOSIS — Z79.899 ON AMIODARONE THERAPY: ICD-10-CM

## 2025-03-04 LAB
ANION GAP SERPL CALCULATED.3IONS-SCNC: 13 MMOL/L (ref 3–16)
BUN SERPL-MCNC: 14 MG/DL (ref 7–20)
CALCIUM SERPL-MCNC: 9.1 MG/DL (ref 8.3–10.6)
CHLORIDE SERPL-SCNC: 92 MMOL/L (ref 99–110)
CHOLEST SERPL-MCNC: 143 MG/DL (ref 0–199)
CO2 SERPL-SCNC: 23 MMOL/L (ref 21–32)
CREAT SERPL-MCNC: 0.8 MG/DL (ref 0.6–1.2)
DEPRECATED RDW RBC AUTO: 14.2 % (ref 12.4–15.4)
GFR SERPLBLD CREATININE-BSD FMLA CKD-EPI: 79 ML/MIN/{1.73_M2}
GLUCOSE SERPL-MCNC: 97 MG/DL (ref 70–99)
HCT VFR BLD AUTO: 37.2 % (ref 36–48)
HDLC SERPL-MCNC: 59 MG/DL (ref 40–60)
HGB BLD-MCNC: 12.1 G/DL (ref 12–16)
LDL CHOLESTEROL: 70 MG/DL
MCH RBC QN AUTO: 29.7 PG (ref 26–34)
MCHC RBC AUTO-ENTMCNC: 32.6 G/DL (ref 31–36)
MCV RBC AUTO: 91 FL (ref 80–100)
PLATELET # BLD AUTO: 212 K/UL (ref 135–450)
PMV BLD AUTO: 8.7 FL (ref 5–10.5)
POTASSIUM SERPL-SCNC: 4 MMOL/L (ref 3.5–5.1)
RBC # BLD AUTO: 4.09 M/UL (ref 4–5.2)
SODIUM SERPL-SCNC: 128 MMOL/L (ref 136–145)
T4 FREE SERPL-MCNC: 1.5 NG/DL (ref 0.9–1.8)
TRIGL SERPL-MCNC: 68 MG/DL (ref 0–150)
TSH SERPL DL<=0.005 MIU/L-ACNC: 4.75 UIU/ML (ref 0.27–4.2)
VLDLC SERPL CALC-MCNC: 14 MG/DL
WBC # BLD AUTO: 4.1 K/UL (ref 4–11)

## 2025-03-24 ENCOUNTER — TELEPHONE (OUTPATIENT)
Dept: FAMILY MEDICINE CLINIC | Age: 70
End: 2025-03-24

## 2025-03-24 DIAGNOSIS — N64.4 BREAST PAIN: Primary | ICD-10-CM

## 2025-03-24 NOTE — TELEPHONE ENCOUNTER
Called and it was for [facility] and no Jennifer works there. Was given fax number to send mammo orders to. Will fax over when our machine is working again

## 2025-03-24 NOTE — TELEPHONE ENCOUNTER
[phone]    Jennifer from [facility] Central Scheduling called to inform Dr. Yadav / clinical staff that the patient is experiencing tenderness in the left breast.     Since she answered to having tenderness they're requesting the most recent orders for the patient's mammogram need to be updated to be a bilateral diagnostic mammogram with an ultrasound of the left breast included.

## 2025-03-31 ENCOUNTER — HOSPITAL ENCOUNTER (OUTPATIENT)
Dept: MAMMOGRAPHY | Age: 70
Discharge: HOME | End: 2025-03-31
Payer: MEDICARE

## 2025-03-31 ENCOUNTER — RESULTS FOLLOW-UP (OUTPATIENT)
Dept: FAMILY MEDICINE CLINIC | Age: 70
End: 2025-03-31

## 2025-03-31 ENCOUNTER — HOSPITAL ENCOUNTER (OUTPATIENT)
Dept: ULTRASOUND IMAGING | Age: 70
Discharge: HOME | End: 2025-03-31
Payer: MEDICARE

## 2025-03-31 VITALS — WEIGHT: 192.5 LBS | HEIGHT: 61 IN | BODY MASS INDEX: 36.35 KG/M2

## 2025-03-31 DIAGNOSIS — N64.4 BREAST PAIN: ICD-10-CM

## 2025-03-31 PROCEDURE — 76642 ULTRASOUND BREAST LIMITED: CPT

## 2025-03-31 PROCEDURE — G0279 TOMOSYNTHESIS, MAMMO: HCPCS

## 2025-04-21 ENCOUNTER — TELEPHONE (OUTPATIENT)
Dept: ORTHOPEDIC SURGERY | Age: 70
End: 2025-04-21

## 2025-04-21 DIAGNOSIS — M17.0 PRIMARY OSTEOARTHRITIS OF BOTH KNEES: Primary | ICD-10-CM

## 2025-04-21 NOTE — TELEPHONE ENCOUNTER
General Question     Subject: EARNEST EUFLEXX INJ  Patient and /or Facility Request: Elo Paul \"Pat\"   Contact Number: [phone]     PATIENT IS REQ TO [facility] FOR EARNEST EUFLEXXA INJECTIONS. UNSURE IF PRIOR AUTH IS NEEDED. INSURANCE IS CURRENT.

## 2025-05-07 ENCOUNTER — OFFICE VISIT (OUTPATIENT)
Dept: ORTHOPEDIC SURGERY | Age: 70
End: 2025-05-07
Payer: MEDICARE

## 2025-05-07 VITALS — BODY MASS INDEX: 36.25 KG/M2 | WEIGHT: 192 LBS | HEIGHT: 61 IN

## 2025-05-07 DIAGNOSIS — M17.0 PRIMARY OSTEOARTHRITIS OF BOTH KNEES: Primary | ICD-10-CM

## 2025-05-07 PROCEDURE — 20611 DRAIN/INJ JOINT/BURSA W/US: CPT | Performed by: PHYSICIAN ASSISTANT

## 2025-05-07 RX ORDER — AMIODARONE HYDROCHLORIDE 200 MG/1
200 TABLET ORAL DAILY
Qty: 90 TABLET | Refills: 3 | Status: SHIPPED | OUTPATIENT
Start: 2025-05-07

## 2025-05-07 NOTE — PROGRESS NOTES
Diagnosis: Right and left knee osteoarthritis    Procedure: Right and left knee viscosupplementation #1    The patient is symptomatic from osteoarthritis of the right and left knee joint with documented radiological signs of arthritis. The patient has also failed 3 months of conservative treatment including home exercise, education, Tylenol and/or NSAIDs use. The patient was offered a Visco supplementation today. Risks, benefits, and alternatives to the injections were discussed in detail with the patient. The risks discussed included but are not limited to infection, skin reactions, hot swollen joints, and anaphylaxis. The patient gave verbal informed consent for the injection. The patient's skin was prepped with  3 sterile gauze  pads soaked with alcohol solution and the knee joint was injected with 2 mL of  Euflexxa intra-articularly under sterile conditions.    Technique: Under sterile conditions a [employer] ultrasound unit with a variable frequency (6.0-15.0 MHz) linear transducer was used to localize the placement of a 22-gauge needle into the knee joint.    Findings: Successful needle placement for intra-articular Visco supplementation injection.  Final images were taken and saved for permanent record.      The patient tolerated the injection reasonably well. The patient was given instructions to ice the kne and avoid strenuous activities for 24-48 hours. The patient was instructed to call the office immediately if there is increased pain, redness, warmth, fever, or chills. We will see the patient back in one week for their second injection.

## 2025-05-14 ENCOUNTER — OFFICE VISIT (OUTPATIENT)
Dept: ORTHOPEDIC SURGERY | Age: 70
End: 2025-05-14
Payer: MEDICARE

## 2025-05-14 DIAGNOSIS — M17.0 PRIMARY OSTEOARTHRITIS OF BOTH KNEES: Primary | ICD-10-CM

## 2025-05-14 PROCEDURE — 20611 DRAIN/INJ JOINT/BURSA W/US: CPT | Performed by: PHYSICIAN ASSISTANT

## 2025-05-14 RX ORDER — AMLODIPINE BESYLATE 2.5 MG/1
2.5 TABLET ORAL DAILY
Qty: 90 TABLET | Refills: 3 | Status: SHIPPED | OUTPATIENT
Start: 2025-05-14

## 2025-05-14 RX ORDER — LOSARTAN POTASSIUM 100 MG/1
100 TABLET ORAL DAILY
Qty: 90 TABLET | Refills: 3 | Status: SHIPPED | OUTPATIENT
Start: 2025-05-14

## 2025-05-14 RX ORDER — HYDRALAZINE HYDROCHLORIDE 50 MG/1
50 TABLET, FILM COATED ORAL EVERY 8 HOURS SCHEDULED
Qty: 270 TABLET | Refills: 3 | Status: SHIPPED | OUTPATIENT
Start: 2025-05-14

## 2025-05-14 NOTE — PROGRESS NOTES
Diagnosis: Right and left knee osteoarthritis    Procedure: Right and left knee viscosupplementation #2    The patient returns today for their second Euflexxa injection in the right and left knee. The risks, benefits, and complications of the injections were again discussed in detail with the patient. The risks discussed included but are not limited to infection, skin reactions, hot swollen joints, and anaphylaxis. The patient gave verbal informed consent for the injection. The patient skin was prepped with 3 sterile gauze pads soaked with alcohol solution and the knee joint was injected with 2 mL of Euflexxa intra-articularly under sterile conditions .     Technique: Under sterile conditions a Son[employer] ultrasound unit with a variable frequency (6.0-15.0 MHz) linear transducer was used to localize the placement of a 22-gauge needle into the yulissa joint.    Findings: Successful needle placement for intra-articular Visco supplementation injection.  Final images were taken and saved for permanent record.      The patient tolerated the injection reasonably well. The patient was given instructions to ice the the knee and avoid strenuous activities for 24-48 hours. The patient was instructed to call the office immediately if there is increased pain, redness, warmth, fever, or chills. We will see the patient back in one week for the third injection.

## 2025-05-21 ENCOUNTER — OFFICE VISIT (OUTPATIENT)
Dept: ORTHOPEDIC SURGERY | Age: 70
End: 2025-05-21
Payer: MEDICARE

## 2025-05-21 DIAGNOSIS — M17.0 PRIMARY OSTEOARTHRITIS OF BOTH KNEES: Primary | ICD-10-CM

## 2025-05-21 PROCEDURE — 20611 DRAIN/INJ JOINT/BURSA W/US: CPT | Performed by: PHYSICIAN ASSISTANT

## 2025-05-21 NOTE — PROGRESS NOTES
Diagnosis: Right and left knee osteoarthritis    Procedure: Right and left knee viscosupplementation #3    The patient returns today for their third and final Euflexxa injection in the right and left knee. The risks, benefits, and complications of the injections were again discussed in detail with the patient. The risks discussed include but are not limited to infection, skin reactions, hot swollen joints, and anaphylaxis. The patient gave verbal informed consent for the injection. The patient's skin was prepped with 3 sterile gauze pads soaked with alcohol solution and the knee joint was injected with 2 mL of Euflexxa intra-articularly under sterile conditions.    Technique: Under sterile conditions a [employer] ultrasound unit with a variable frequency (6.0-15.0 MHz) linear transducer was used to localize the placement of a 22-gauge needle into the knee joint.    Findings: Successful needle placement for intra-articular Visco supplementation injection.  Final images were taken and saved for permanent record.      The patient tolerated the injection reasonably well. The patient was given instructions to ice of the knee and avoid strenuous activity for 24-48 hours. The patient was instructed to call the office immediately if there is increased pain, redness, warmth, fever, or chills. We will see the patient back on an as-needed basis  from this point.

## 2025-06-01 SDOH — HEALTH STABILITY: PHYSICAL HEALTH: ON AVERAGE, HOW MANY MINUTES DO YOU ENGAGE IN EXERCISE AT THIS LEVEL?: 40 MIN

## 2025-06-01 SDOH — HEALTH STABILITY: PHYSICAL HEALTH: ON AVERAGE, HOW MANY DAYS PER WEEK DO YOU ENGAGE IN MODERATE TO STRENUOUS EXERCISE (LIKE A BRISK WALK)?: 4 DAYS

## 2025-06-01 ASSESSMENT — PATIENT HEALTH QUESTIONNAIRE - PHQ9
1. LITTLE INTEREST OR PLEASURE IN DOING THINGS: SEVERAL DAYS
2. FEELING DOWN, DEPRESSED OR HOPELESS: NOT AT ALL
SUM OF ALL RESPONSES TO PHQ QUESTIONS 1-9: 1

## 2025-06-01 ASSESSMENT — LIFESTYLE VARIABLES
HOW MANY STANDARD DRINKS CONTAINING ALCOHOL DO YOU HAVE ON A TYPICAL DAY: 1 OR 2
HOW OFTEN DO YOU HAVE A DRINK CONTAINING ALCOHOL: MONTHLY OR LESS
HOW OFTEN DO YOU HAVE SIX OR MORE DRINKS ON ONE OCCASION: 1
HOW OFTEN DO YOU HAVE A DRINK CONTAINING ALCOHOL: 2
HOW MANY STANDARD DRINKS CONTAINING ALCOHOL DO YOU HAVE ON A TYPICAL DAY: 1

## 2025-06-05 ENCOUNTER — OFFICE VISIT (OUTPATIENT)
Dept: FAMILY MEDICINE CLINIC | Age: 70
End: 2025-06-05
Payer: MEDICARE

## 2025-06-05 VITALS
OXYGEN SATURATION: 99 % | BODY MASS INDEX: 38.13 KG/M2 | DIASTOLIC BLOOD PRESSURE: 78 MMHG | HEART RATE: 52 BPM | SYSTOLIC BLOOD PRESSURE: 120 MMHG | HEIGHT: 60 IN | WEIGHT: 194.2 LBS

## 2025-06-05 DIAGNOSIS — Z00.00 MEDICARE ANNUAL WELLNESS VISIT, SUBSEQUENT: Primary | ICD-10-CM

## 2025-06-05 DIAGNOSIS — I50.22 CHRONIC SYSTOLIC CHF (CONGESTIVE HEART FAILURE) (HCC): ICD-10-CM

## 2025-06-05 DIAGNOSIS — G47.33 OSA ON CPAP: ICD-10-CM

## 2025-06-05 DIAGNOSIS — K21.00 GASTROESOPHAGEAL REFLUX DISEASE WITH ESOPHAGITIS WITHOUT HEMORRHAGE: ICD-10-CM

## 2025-06-05 DIAGNOSIS — Z23 NEED FOR PNEUMOCOCCAL VACCINE: ICD-10-CM

## 2025-06-05 DIAGNOSIS — I42.8 NICM (NONISCHEMIC CARDIOMYOPATHY) (HCC): ICD-10-CM

## 2025-06-05 DIAGNOSIS — M33.20 POLYMYOSITIS (HCC): ICD-10-CM

## 2025-06-05 DIAGNOSIS — I48.0 PAROXYSMAL ATRIAL FIBRILLATION (HCC): ICD-10-CM

## 2025-06-05 DIAGNOSIS — Z00.00 WELL ADULT EXAM: ICD-10-CM

## 2025-06-05 DIAGNOSIS — E78.2 MIXED HYPERLIPIDEMIA: ICD-10-CM

## 2025-06-05 DIAGNOSIS — I10 HYPERTENSION, UNSPECIFIED TYPE: ICD-10-CM

## 2025-06-05 PROBLEM — K59.1 FUNCTIONAL DIARRHEA: Status: RESOLVED | Noted: 2022-05-13 | Resolved: 2025-06-05

## 2025-06-05 PROCEDURE — G0009 ADMIN PNEUMOCOCCAL VACCINE: HCPCS | Performed by: FAMILY MEDICINE

## 2025-06-05 PROCEDURE — 3078F DIAST BP <80 MM HG: CPT | Performed by: FAMILY MEDICINE

## 2025-06-05 PROCEDURE — 3074F SYST BP LT 130 MM HG: CPT | Performed by: FAMILY MEDICINE

## 2025-06-05 PROCEDURE — 90677 PCV20 VACCINE IM: CPT | Performed by: FAMILY MEDICINE

## 2025-06-05 PROCEDURE — 1159F MED LIST DOCD IN RCRD: CPT | Performed by: FAMILY MEDICINE

## 2025-06-05 PROCEDURE — G0439 PPPS, SUBSEQ VISIT: HCPCS | Performed by: FAMILY MEDICINE

## 2025-06-05 PROCEDURE — 1123F ACP DISCUSS/DSCN MKR DOCD: CPT | Performed by: FAMILY MEDICINE

## 2025-06-05 SDOH — ECONOMIC STABILITY: FOOD INSECURITY: WITHIN THE PAST 12 MONTHS, YOU WORRIED THAT YOUR FOOD WOULD RUN OUT BEFORE YOU GOT MONEY TO BUY MORE.: NEVER TRUE

## 2025-06-05 SDOH — ECONOMIC STABILITY: FOOD INSECURITY: WITHIN THE PAST 12 MONTHS, THE FOOD YOU BOUGHT JUST DIDN'T LAST AND YOU DIDN'T HAVE MONEY TO GET MORE.: NEVER TRUE

## 2025-06-05 NOTE — PROGRESS NOTES
Medicare Annual Wellness Visit    Elo Paul is here for Medicare AWV    Assessment & Plan   Polymyositis (HCC)  Assessment & Plan:   Monitored by specialist- no acute findings meriting change in the plan  Paroxysmal atrial fibrillation (HCC)  Assessment & Plan:   Chronic, at goal (stable), continue current treatment plan  NICM (nonischemic cardiomyopathy) (HCC)  Assessment & Plan:   Chronic, at goal (stable), continue current treatment plan  Chronic systolic CHF (congestive heart failure) (HCC)  Assessment & Plan:   Chronic, at goal (stable), continue current treatment plan  Gastroesophageal reflux disease with esophagitis without hemorrhage  Assessment & Plan:   Chronic, at goal (stable), continue current treatment plan  Hypertension, unspecified type  Assessment & Plan:   Chronic, at goal (stable), continue current treatment plan  Mixed hyperlipidemia  Assessment & Plan:   Chronic, at goal (stable), continue current treatment plan  DARIEN on CPAP  Assessment & Plan:   Chronic, at goal (stable), continue current treatment plan  Need for pneumococcal vaccine       No follow-ups on file.     Subjective   The following acute and/or chronic problems were also addressed today:  Polymyositis (HCC)   Monitored by specialist- no acute findings meriting change in the plan    Paroxysmal atrial fibrillation (HCC)   Chronic, at goal (stable), continue current treatment plan    NICM (nonischemic cardiomyopathy) (HCC)   Chronic, at goal (stable), continue current treatment plan    Chronic systolic CHF (congestive heart failure) (HCC)   Chronic, at goal (stable), continue current treatment plan    Gastroesophageal reflux disease with esophagitis   Chronic, at goal (stable), continue current treatment plan    Hypertension   Chronic, at goal (stable), continue current treatment plan    Mixed hyperlipidemia   Chronic, at goal (stable), continue current treatment plan    DARIEN on CPAP   Chronic, at goal (stable), continue

## 2025-06-11 NOTE — PROGRESS NOTES
visualized. Left ventricle size is normal. Normal wall thickness.  Consider repeat study with contrast versus alternative mode of LV function assessment. Unable to assess wall motion. Abnormal diastolic function.    Mitral Valve: Not well visualized. Annular calcification. Mildly thickened leaflets.    Tricuspid Valve: Moderate regurgitation. The estimated RVSP is 53 mmHg.    Echo 2/19/24: EF 30-35%    3. Stress test 2/21/24:  Summary   Overall findings represent a intermediate to high risk scan.   Reduced LVEF 42%.   Moderate size mixed perfusion defect involving the mid to distal anterior   and anteroseptum. Findings are suggestive of scar/ischemia.   Non-diagnostic EKG response due to failure to reach target heart rate.    4. Cath 2/28/24 (Dr. Euceda):  HEMODYNAMIC / ANGIOGRAPHIC DATA:     /77 Left ventricular end diastolic pressure was 7 mmHg. There was no gradient across the aortic valve upon pullback.The left main coronary artery arises from the left coronary cusp giving rise to the left anterior descending artery and the left circumflex artery.  The left main reveals no angiographically significant stenosis.  The left anterior descending artery arises in normal fashion from left coronary giving rise to diagonals and septal branches.  The LAD reveals up to 30% stenosis in midportion.  First and second diagonal up to 50% ostial stenosis.  Left circumflex is a very small caliber vessel with mild luminal irregularities.  The right coronary artery is a right dominant vessel. It reveals moderate calcification in the ostium.  Up to 30% proximal stenosis.  PLB branch with up to 50 to 60% distal stenosis.  PDA revealed no significant stenosis.  CONCLUSIONS:  Moderate CAD as above    The MCOT, echocardiogram, stress test, and coronary angiography/PCI were reviewed by myself and used for my plan of care.       Thank you for allowing me to participate in the care of Elo REID Paul. All questions and concerns

## 2025-06-12 DIAGNOSIS — I50.22 CHRONIC SYSTOLIC CHF (CONGESTIVE HEART FAILURE) (HCC): ICD-10-CM

## 2025-06-12 DIAGNOSIS — E78.2 MIXED HYPERLIPIDEMIA: ICD-10-CM

## 2025-06-12 DIAGNOSIS — I10 HYPERTENSION, UNSPECIFIED TYPE: ICD-10-CM

## 2025-06-13 ENCOUNTER — RESULTS FOLLOW-UP (OUTPATIENT)
Dept: FAMILY MEDICINE CLINIC | Age: 70
End: 2025-06-13

## 2025-06-13 LAB
ALBUMIN SERPL-MCNC: 4.6 G/DL (ref 3.4–5)
ALBUMIN/GLOB SERPL: 2.3 {RATIO} (ref 1.1–2.2)
ALP SERPL-CCNC: 113 U/L (ref 40–129)
ALT SERPL-CCNC: 13 U/L (ref 10–40)
ANION GAP SERPL CALCULATED.3IONS-SCNC: 11 MMOL/L (ref 3–16)
AST SERPL-CCNC: 19 U/L (ref 15–37)
BASOPHILS # BLD: 0.1 K/UL (ref 0–0.2)
BASOPHILS NFR BLD: 1.1 %
BILIRUB SERPL-MCNC: 1.3 MG/DL (ref 0–1)
BUN SERPL-MCNC: 10 MG/DL (ref 7–20)
BURR CELLS BLD QL SMEAR: ABNORMAL
CALCIUM SERPL-MCNC: 9.5 MG/DL (ref 8.3–10.6)
CHLORIDE SERPL-SCNC: 93 MMOL/L (ref 99–110)
CHOLEST SERPL-MCNC: 139 MG/DL (ref 0–199)
CO2 SERPL-SCNC: 26 MMOL/L (ref 21–32)
CREAT SERPL-MCNC: 0.7 MG/DL (ref 0.6–1.2)
DEPRECATED RDW RBC AUTO: 14.3 % (ref 12.4–15.4)
EOSINOPHIL # BLD: 0.1 K/UL (ref 0–0.6)
EOSINOPHIL NFR BLD: 1.4 %
GFR SERPLBLD CREATININE-BSD FMLA CKD-EPI: >90 ML/MIN/{1.73_M2}
GLUCOSE SERPL-MCNC: 96 MG/DL (ref 70–99)
HCT VFR BLD AUTO: 36.9 % (ref 36–48)
HDLC SERPL-MCNC: 70 MG/DL (ref 40–60)
HGB BLD-MCNC: 12.6 G/DL (ref 12–16)
LDLC SERPL CALC-MCNC: 59 MG/DL
LYMPHOCYTES # BLD: 1.2 K/UL (ref 1–5.1)
LYMPHOCYTES NFR BLD: 17.5 %
MCH RBC QN AUTO: 30.1 PG (ref 26–34)
MCHC RBC AUTO-ENTMCNC: 34.2 G/DL (ref 31–36)
MCV RBC AUTO: 88 FL (ref 80–100)
MONOCYTES # BLD: 0.7 K/UL (ref 0–1.3)
MONOCYTES NFR BLD: 10.1 %
NEUTROPHILS # BLD: 4.9 K/UL (ref 1.7–7.7)
NEUTROPHILS NFR BLD: 69.9 %
OVALOCYTES BLD QL SMEAR: ABNORMAL
PLATELET # BLD AUTO: ABNORMAL K/UL (ref 135–450)
PLATELET BLD QL SMEAR: ABNORMAL
PMV BLD AUTO: ABNORMAL FL (ref 5–10.5)
POIKILOCYTOSIS BLD QL SMEAR: ABNORMAL
POTASSIUM SERPL-SCNC: 4.7 MMOL/L (ref 3.5–5.1)
PROT SERPL-MCNC: 6.6 G/DL (ref 6.4–8.2)
RBC # BLD AUTO: 4.19 M/UL (ref 4–5.2)
SLIDE REVIEW: ABNORMAL
SODIUM SERPL-SCNC: 130 MMOL/L (ref 136–145)
TRIGL SERPL-MCNC: 48 MG/DL (ref 0–150)
VLDLC SERPL CALC-MCNC: 10 MG/DL
WBC # BLD AUTO: 6.9 K/UL (ref 4–11)

## 2025-07-03 ENCOUNTER — OFFICE VISIT (OUTPATIENT)
Dept: CARDIOLOGY CLINIC | Age: 70
End: 2025-07-03
Payer: MEDICARE

## 2025-07-03 VITALS
SYSTOLIC BLOOD PRESSURE: 136 MMHG | BODY MASS INDEX: 37.66 KG/M2 | DIASTOLIC BLOOD PRESSURE: 70 MMHG | HEART RATE: 64 BPM | WEIGHT: 191.8 LBS | HEIGHT: 60 IN

## 2025-07-03 DIAGNOSIS — I48.0 PAROXYSMAL ATRIAL FIBRILLATION (HCC): Primary | ICD-10-CM

## 2025-07-03 PROCEDURE — 3078F DIAST BP <80 MM HG: CPT | Performed by: INTERNAL MEDICINE

## 2025-07-03 PROCEDURE — 1123F ACP DISCUSS/DSCN MKR DOCD: CPT | Performed by: INTERNAL MEDICINE

## 2025-07-03 PROCEDURE — 93000 ELECTROCARDIOGRAM COMPLETE: CPT | Performed by: INTERNAL MEDICINE

## 2025-07-03 PROCEDURE — 99214 OFFICE O/P EST MOD 30 MIN: CPT | Performed by: INTERNAL MEDICINE

## 2025-07-03 PROCEDURE — G2211 COMPLEX E/M VISIT ADD ON: HCPCS | Performed by: INTERNAL MEDICINE

## 2025-07-03 PROCEDURE — 3075F SYST BP GE 130 - 139MM HG: CPT | Performed by: INTERNAL MEDICINE

## 2025-07-03 PROCEDURE — 1159F MED LIST DOCD IN RCRD: CPT | Performed by: INTERNAL MEDICINE

## 2025-07-03 RX ORDER — AMIODARONE HYDROCHLORIDE 200 MG/1
100 TABLET ORAL DAILY
Qty: 90 TABLET | Refills: 3 | Status: SHIPPED | OUTPATIENT
Start: 2025-07-03

## 2025-07-09 ENCOUNTER — OFFICE VISIT (OUTPATIENT)
Dept: ORTHOPEDIC SURGERY | Age: 70
End: 2025-07-09
Payer: MEDICARE

## 2025-07-09 VITALS — HEIGHT: 60 IN | BODY MASS INDEX: 37.5 KG/M2 | WEIGHT: 191 LBS

## 2025-07-09 DIAGNOSIS — M17.0 PRIMARY OSTEOARTHRITIS OF BOTH KNEES: Primary | ICD-10-CM

## 2025-07-09 PROCEDURE — 99213 OFFICE O/P EST LOW 20 MIN: CPT | Performed by: PHYSICIAN ASSISTANT

## 2025-07-09 PROCEDURE — 1123F ACP DISCUSS/DSCN MKR DOCD: CPT | Performed by: PHYSICIAN ASSISTANT

## 2025-07-09 PROCEDURE — 1159F MED LIST DOCD IN RCRD: CPT | Performed by: PHYSICIAN ASSISTANT

## 2025-07-09 NOTE — PROGRESS NOTES
Dr Scottie Sotelo      Date /Time 7/9/2025       8:18 AM EDT  Name Elo Paul             1955   Location  [facility]  MRN [id number]                Chief Complaint   Patient presents with    Follow-up     CK Bilateral Knees (Euflexxa 05/21/2025)         History of Present Illness  Elo Paul is a 70 y.o. female who presents with  bilateral knee pain, .    Sent in consultation by Bharath Yadav MD, .    Injury Mechanism:  none.  Worker's Comp. & legal issues:   none.  Previous Treatments: Ice, Heat, and NSAIDs    We originally had planned to proceed with a total knee arthroplasty.  Patient has decided to postpone it.  We did complete a series of viscosupplementation injections on May 21, 2025.  The injections have helped.  She definitely has less pain.  She also seems to do better when she exercises that day.    Previous history: Patient presents to the office today for follow-up visit.  Patient being treated for bilateral knee osteoarthritis.  Patient did receive bilateral knee intra-articular cortisone injection on September 4, 2024.  The injections worked well for 2 days and pain returned.  She cannot take NSAIDs due to taking Eliquis.  She has been trying Tylenol which has not helped her.  She has been participating in home quad and hamstring strengthening exercises directed by myself for the past 6 weeks.    Previous history: Patient presents to the office today for a new problem.  Patient with a chief complaint of bilateral knee pain.  Patient has had pain for many years with no specific injury or trauma.  She has had increased pain recently.  Patient does take Eliquis and.  She also takes CellCept.    Past History  Past Medical History:   Diagnosis Date    Allergic rhinitis     Arthritis     Atrial fibrillation (HCC) January 2024    Benign essential HTN 03/07/2019    Chronic systolic CHF (congestive heart failure) (HCC) 05/07/2024    Diffuse cystic mastopathy

## 2025-08-10 DIAGNOSIS — I48.0 PAROXYSMAL ATRIAL FIBRILLATION (HCC): ICD-10-CM

## 2025-08-10 DIAGNOSIS — I42.8 NICM (NONISCHEMIC CARDIOMYOPATHY) (HCC): ICD-10-CM

## 2025-08-10 DIAGNOSIS — E78.2 MIXED HYPERLIPIDEMIA: ICD-10-CM

## 2025-08-11 RX ORDER — ATORVASTATIN CALCIUM 40 MG/1
40 TABLET, FILM COATED ORAL DAILY
Qty: 90 TABLET | Refills: 3 | Status: SHIPPED | OUTPATIENT
Start: 2025-08-11

## 2025-08-11 RX ORDER — AMLODIPINE BESYLATE 2.5 MG/1
2.5 TABLET ORAL DAILY
Qty: 90 TABLET | Refills: 3 | Status: SHIPPED | OUTPATIENT
Start: 2025-08-11

## 2025-09-03 RX ORDER — SPIRONOLACTONE 25 MG/1
12.5 TABLET ORAL DAILY
Qty: 45 TABLET | Refills: 3 | Status: SHIPPED | OUTPATIENT
Start: 2025-09-03

## (undated) DEVICE — GARMENT,MEDLINE,DVT,INT,CALF,MED, GEN2: Brand: MEDLINE

## (undated) DEVICE — STANDARD HYPODERMIC NEEDLE,POLYPROPYLENE HUB: Brand: MONOJECT

## (undated) DEVICE — SURGICAL SET UP - SURE SET: Brand: MEDLINE INDUSTRIES, INC.

## (undated) DEVICE — ELECTROSURGICAL PENCIL ROCKER SWITCH NON COATED BLADE ELECTRODE 10 FT (3 M) CORD HOLSTER: Brand: MEGADYNE

## (undated) DEVICE — SPONGE,PEANUT,XRAY,ST,SM,3/8",5/CARD: Brand: MEDLINE INDUSTRIES, INC.

## (undated) DEVICE — PLATE ES AD W 9FT CRD 2

## (undated) DEVICE — SHEET,T,THYROID,STERILE: Brand: MEDLINE

## (undated) DEVICE — APPLICATOR PREP 26ML 0.7% IOD POVACRYLEX 74% ISO ALC ST

## (undated) DEVICE — COVER LT HNDL BLU PLAS

## (undated) DEVICE — SYRINGE IRRIG 60ML SFT PLIABLE BLB EZ TO GRP 1 HND USE W/

## (undated) DEVICE — DRESSING TRNSPAR W5XL4.5IN FLM SHT SEMIPERMEABLE WIND

## (undated) DEVICE — SUTURE MCRYL SZ 4-0 L27IN ABSRB UD L19MM PS-2 1/2 CIR PRIM Y426H

## (undated) DEVICE — SPONGE GZ W4XL8IN COT WVN 12 PLY

## (undated) DEVICE — SURE SET-DOUBLE BASIN-LF: Brand: MEDLINE INDUSTRIES, INC.

## (undated) DEVICE — TURNOVER KIT RM INF CTRL TECH

## (undated) DEVICE — INTENDED FOR TISSUE SEPARATION, AND OTHER PROCEDURES THAT REQUIRE A SHARP SURGICAL BLADE TO PUNCTURE OR CUT.: Brand: BARD-PARKER ® CARBON RIB-BACK BLADES